# Patient Record
Sex: MALE | Race: WHITE | NOT HISPANIC OR LATINO | Employment: FULL TIME | URBAN - METROPOLITAN AREA
[De-identification: names, ages, dates, MRNs, and addresses within clinical notes are randomized per-mention and may not be internally consistent; named-entity substitution may affect disease eponyms.]

---

## 2017-07-03 ENCOUNTER — OFFICE VISIT (OUTPATIENT)
Dept: FAMILY MEDICINE | Facility: CLINIC | Age: 57
End: 2017-07-03

## 2017-07-03 VITALS
SYSTOLIC BLOOD PRESSURE: 122 MMHG | HEART RATE: 66 BPM | RESPIRATION RATE: 16 BRPM | BODY MASS INDEX: 28.64 KG/M2 | OXYGEN SATURATION: 97 % | HEIGHT: 74 IN | DIASTOLIC BLOOD PRESSURE: 74 MMHG | WEIGHT: 223.2 LBS

## 2017-07-03 DIAGNOSIS — R20.9 DISTURBANCE OF SKIN SENSATION: ICD-10-CM

## 2017-07-03 DIAGNOSIS — Z00.00 ROUTINE GENERAL MEDICAL EXAMINATION AT A HEALTH CARE FACILITY: Primary | ICD-10-CM

## 2017-07-03 DIAGNOSIS — Z13.220 LIPID SCREENING: ICD-10-CM

## 2017-07-03 DIAGNOSIS — Z98.84 GASTRIC BYPASS STATUS FOR OBESITY: ICD-10-CM

## 2017-07-03 DIAGNOSIS — E66.3 OVERWEIGHT (BMI 25.0-29.9): ICD-10-CM

## 2017-07-03 DIAGNOSIS — Z86.39 HISTORY OF DIABETES MELLITUS: ICD-10-CM

## 2017-07-03 DIAGNOSIS — Z80.42 FAMILY HISTORY OF PROSTATE CANCER: ICD-10-CM

## 2017-07-03 DIAGNOSIS — M15.0 PRIMARY OSTEOARTHRITIS INVOLVING MULTIPLE JOINTS: ICD-10-CM

## 2017-07-03 PROCEDURE — 36415 COLL VENOUS BLD VENIPUNCTURE: CPT | Performed by: FAMILY MEDICINE

## 2017-07-03 PROCEDURE — 99213 OFFICE O/P EST LOW 20 MIN: CPT | Mod: 25 | Performed by: FAMILY MEDICINE

## 2017-07-03 PROCEDURE — 82746 ASSAY OF FOLIC ACID SERUM: CPT | Mod: 90 | Performed by: FAMILY MEDICINE

## 2017-07-03 PROCEDURE — 82607 VITAMIN B-12: CPT | Mod: 90 | Performed by: FAMILY MEDICINE

## 2017-07-03 PROCEDURE — 99396 PREV VISIT EST AGE 40-64: CPT | Performed by: FAMILY MEDICINE

## 2017-07-03 PROCEDURE — 80061 LIPID PANEL: CPT | Mod: 90 | Performed by: FAMILY MEDICINE

## 2017-07-03 PROCEDURE — 84153 ASSAY OF PSA TOTAL: CPT | Mod: 90 | Performed by: FAMILY MEDICINE

## 2017-07-03 PROCEDURE — 80053 COMPREHEN METABOLIC PANEL: CPT | Mod: 90 | Performed by: FAMILY MEDICINE

## 2017-07-03 PROCEDURE — 83036 HEMOGLOBIN GLYCOSYLATED A1C: CPT | Mod: 90 | Performed by: FAMILY MEDICINE

## 2017-07-03 NOTE — MR AVS SNAPSHOT
After Visit Summary   7/3/2017    Ethan Weiss    MRN: [id number]           Patient Information     Date Of Birth          1960        Visit Information        Provider Department      7/3/2017 7:45 AM Philip Shah MD [facility]        Today's Diagnoses     Gastric bypass status for obesity    -  1    Atopic dermatitis, unspecified type        History of diabetes mellitus        Family history of prostate cancer        Lipid screening          Care Instructions      Preventive Health Recommendations  Male Ages 50 - 64    Yearly exam:             See your health care provider every year in order to  o   Review health changes.   o   Discuss preventive care.    o   Review your medicines if your doctor has prescribed any.     Have a cholesterol test every 5 years, or more frequently if you are at risk for high cholesterol/heart disease.     Have a diabetes test (fasting glucose) every three years. If you are at risk for diabetes, you should have this test more often.     Have a colonoscopy at age 50, or have a yearly FIT test (stool test). These exams will check for colon cancer.      Talk with your health care provider about whether or not a prostate cancer screening test (PSA) is right for you.    You should be tested each year for STDs (sexually transmitted diseases), if you re at risk.     Shots: Get a flu shot each year. Get a tetanus shot every 10 years.     Nutrition:    Eat at least 5 servings of fruits and vegetables daily.     Eat whole-grain bread, whole-wheat pasta and brown rice instead of white grains and rice.     Talk to your provider about Calcium and Vitamin D.     Lifestyle    Exercise for at least 150 minutes a week (30 minutes a day, 5 days a week). This will help you control your weight and prevent disease.     Limit alcohol to one drink per day.     No smoking.     Wear sunscreen to prevent skin cancer.     See your dentist every six months for an exam and  cleaning.     See your eye doctor every 1 to 2 years.    After Gastric Bypass: Nutrition Guidelines  After gastric bypass surgery, you will need to learn a new way to eat and drink. Your new stomach is much smaller than it was before. And it has a small opening at the bottom called a stoma (your bariatric surgeon may call it the gastrojejunostomy). This can become blocked by food if you are not careful. In order to protect your new stomach and get the results you want, you must:    Eat very small meals.    Eat slowly.    Eat softer foods.    Chew food well.    Don't eat food and drink liquids at the same time.    Take your vitamins and supplements regularly.   It is important to follow the eating plan that has been laid out for you. The surgery was only the first step. Success in losing weight depends on the choices you make after surgery.     Your new stomach holds only a small amount of food now. You will need to measure your food before eating.   After surgery  For 2 to 3 weeks after surgery, you will likely be on a liquid diet. You will then be able to eat pureed or soft foods only for an additional 2 to 3 weeks. Follow your bariatric surgery team's instructions for what liquids and soft foods are best. After this time, you can begin to bring other foods back into your diet, following the guidelines from your bariatric surgery team and dietitian.  Planning meals  After surgery, your stomach can only hold 2 to 4 tablespoons of food or drink. After about a year, it will expand to hold up to 16 tablespoons of food or drink. Because of its small size, you will need to eat and drink much less at any 1 meal than you did before surgery. You will need to plan your meals carefully. The foods you choose should be healthy and nutritious. Work with a dietitian to learn how to eat and the best foods to choose. Follow the eating plan you are given. Below are general guidelines.  How much to eat  Suggestions for how much to  eat include the following:     You should have 4 to 6 small meals per day, following your bariatric dietitian's recommendations.     You will have a planned, scheduled diet for almost 2 months. When you are eating a more normal diet, stick with the recommended foods. Use a small plate. Eat slowly and chew well. Stop eating when you are satisfied, and don't keep eating until you feel a full feeling. You can stretch the stomach pouch if you do that.   What to choose  Suggestions for what to choose include the following:     Try to eat the right amount of protein (see  Get enough protein  below).    Eat fruits and vegetables if they don t cause problems. Remove skins. Cook vegetables to make them easier to digest. Chew them well.    Choose whole-grain foods or add dietary fiber to your meals.  What to avoid  Suggestions for what to choose include the following:     Avoid sugary foods and drinks. They can cause dumping syndrome (see  Prevent dumping syndrome  below). They can also slow your weight loss or cause weight gain.    Limit oils and fats. This includes fried foods. Too much fat can cause nausea. It can also slow your weight loss. It may even cause weight gain.    Avoid alcohol. It has calories, but not nutrients, and can slow your weight loss.    Do not smoke. Smoking is a well-known cause of ulcers at the bottom of the stomach pouch after a gastric bypass.    Don't take NSAIDs on a regular basis. Medicines like ibuprofen, aspirin, or naproxen could cause ulcers at the bottom of the stomach pouch.  How to eat  After surgery, you will need to be careful when you eat. Your stomach is very small and can only hold a small amount of food. Follow these guidelines for eating meals:    Do not drink anything during a meal. You can drink again 30 to 45 minutes after a meal.    Take small bites. Chew your food 20 times before swallowing it. If you can t chew something completely, do not swallow it. Spit it out. This will  help prevent the stoma from being blocked.    Eat slowly. Allow 20 to 30 minutes for a meal.    Stop eating when you feel full. Eating too fast or too much can cause nausea and vomiting. It may also cause pain under your breastbone.    Do not snack between meals. This can limit your weight loss and even cause weight gain.  Preventing complications  Certain problems can happen after gastric bypass surgery. These include dehydration, malnutrition, and dumping syndrome. You will need to eat and drink carefully to prevent these. Read below to learn what you can do.  Keep a daily food and drink log  Note down everything you consume, even condiments such as ketchup and relish. Write down all drinks, including water. This will help you keep track of what and how much you are consuming.  Stay hydrated  Not drinking enough fluids can lead to dehydration. Symptoms include feeling very thirsty or having dark yellow urine. The new stomach can only hold a small amount of liquid at one time. So it is important to sip drinks throughout the day. Drink at least 6 to 8 cups (1 cup is 8 ounces) of sugar-free liquids every day. Drink slowly. Do not use straws or drink out of bottles, as this may cause painful gas. Avoid carbonated drinks for the first few months, as they will also cause gas. Do not drink during meals. This can lead to food not being digested properly.  Get enough protein  Protein is a very important part of your new diet. It makes you feel full and keeps your body working normally. After surgery, your surgical team will likely ask you to take protein shakes every day. You will need to eat low-fat, high-protein foods with each meal. You should work your way up to 60 to 100 grams of protein per day. If you eat meat, make sure it is not tough or full of fat or gristle. If you can t chew the meat thoroughly, don t swallow it. It can block your stoma. Avoid high-fat protein foods, such as sausage, kerr, hot dogs, and  high-fat hamburger meat. Choose low-fat, high-protein foods such as:    Chicken and turkey (white meat)    Fish and shellfish (not breaded or fried)    Eggs, egg whites, and egg substitutes    Low-fat and fat-free dairy products (milk, yogurt, cottage cheese)    Soy milk and tofu    Tuna fish and canned salmon     Peanut butter   Beans, lentils, vegetables, and nuts also contain protein. However, they do not have all the amino acids that animal protein has. You can eat these foods, but you should have them in addition to other animal proteins as listed above. If you have trouble meeting your daily protein needs, you may need to take a protein supplement. Make sure that the protein supplement has only protein and does not contain sugar (or lactose, if you are lactose intolerant).   Not getting enough protein can lead to protein malnutrition. Symptoms of inadequate protein (and caloric) intake include excessive hair loss, dry skin, fatigue, and always feeling cold when others are not cold. Some of these symptoms are common after gastric bypass. You can minimize them by concentrating on protein intake. These symptoms should resolve by 4 to 6 months after the operation.   Reintroduce foods slowly  After surgery, some foods are more likely to cause pain, nausea, vomiting, or blockage. These include meats, fruits, vegetables, breads, pasta, and rice. Try to add these back into your diet one at a time. Chew thoroughly. If you can t tolerate a food, try it again in 1 to 2 weeks. Also, be careful with dairy foods. After surgery, these may give you cramps, bloating, or diarrhea. This is because you may lose the ability to digest lactose after surgery. If necessary, try lactose-free dairy products. You can also try taking lactase pills with dairy foods. This can be cheaper than buying lactose-free milk.  Prevent dumping syndrome  Dumping syndrome is a condition that can happen after gastric bypass surgery. It is related to the  "rapid entry or \"dumping\" of high-sugar meals into the intestine from the stomach pouch. It can happen 10 to 30 minutes after eating sugary foods or as late as 2 to 3 hours after eating. It can also happen after eating too quickly or too much at once. Symptoms may include intestinal cramps, nausea, vomiting, diarrhea, fast heart rate, dizziness, flushing, and sweating. The symptoms usually pass in 15 to 30 minutes. Your symptoms may go away faster if you sip 1 cup of water. You may want to rest afterward. In rare instances, you may have additional symptoms a few hours later, including low blood sugar. You may feel shaky and anxious. Sugar is the most common cause for dumping. You can help prevent dumping syndrome by keeping your diet low in sugar. A low-sugar diet means avoiding:    Sugary foods, such as candy, chocolate, sweetened gum, sweetened yogurt (including frozen yogurt), sugary cereals, sweet baked goods, ice cream, and dried preserved fruit    Sugary drinks, such as nondiet soda, fruit juice, and coffee and tea with sugar or flavored maple syrup    Sugary condiments, such as jam, honey, and syrup  Read food and drink labels to see if they contain sugar. Look for sugars, sweeteners, syrups, cane juice, agave, maltodextrin, and words ending in -ose. You can use artificial sweeteners as substitute for sugar. These include aspartame, saccharine, stevia, and sucralose.  Take vitamin and mineral supplements  After bariatric surgery, your body will not be able to absorb all the vitamins and minerals it needs through food. Symptoms of low amounts of vitamins and minerals in your body include anemia (low blood count), sores around your mouth, a painful tongue, and fatigue. Over time, low amounts of vitamins and minerals can cause serious health problems. You will need to take vitamin and mineral supplements every day for the rest of your life to prevent this. The supplements include:    A chewable multivitamin with " minerals (1 to 2 pills daily; take just before eating)    Calcium citrate with vitamin D (1,200 mg daily; take just before eating)    Other supplements, such as vitamin B12, as advised by your health care provider  When to call the healthcare provider  Call your healthcare provider if you notice any of the following:    Pain, nausea, or vomiting after eating or drinking that doesn t go away in 20 to 30 minutes     Vomiting of blood or bile (yellow-green fluid)     Diarrhea that doesn t go away    Pain in your upper back, chest or left shoulder    Confusion, depression, or unusual fatigue    Urinating more than usual    Difficulty urinating    Burning, pain, or bleeding when you pass urine    Hiccups that won t go away    Night sweats   Date Last Reviewed: 3/25/2016    [id number] [employer]. [address] [place] [address]. All rights reserved. This information is not intended as a substitute for professional medical care. Always follow your healthcare professional's instructions.                Follow-ups after your visit        Your next 10 appointments already scheduled     Nov 07, 2017  9:00 AM CST   Annual Visit with Yina Grigsby PA-C   [facility] ([facility])    [address] [place] [id number] [phone]            Nov 07, 2017  9:30 AM CST   Annual Visit with Meño Sheth 3, CARISSA   [facility] ([facility])    [address] [place] [id number] [phone]              Who to contact     If you have questions or need follow up information about today's clinic visit or your schedule please contact [facility] directly at [phone].  Normal or non-critical lab and imaging results will be communicated to you by MyChart, letter or phone within 4 business days after the clinic has received the  "results. If you do not hear from us within 7 days, please contact the clinic through [employer] or phone. If you have a critical or abnormal lab result, we will notify you by phone as soon as possible.  Submit refill requests through [employer] or call your pharmacy and they will forward the refill request to us. Please allow 3 business days for your refill to be completed.          Additional Information About Your Visit        [employer] Information     [employer] gives you secure access to your electronic health record. If you see a primary care provider, you can also send messages to your care team and make appointments. If you have questions, please call your primary care clinic.  If you do not have a primary care provider, please call [phone] and they will assist you.        Care EveryWhere ID     This is your Care EveryWhere ID. This could be used by other organizations to access your [facility] medical records  [id number]        Your Vitals Were     Pulse Respirations Height Pulse Oximetry BMI (Body Mass Index)       66 16 1.867 m (6' 1.5\") 97% 29.05 kg/m2        Blood Pressure from Last 3 Encounters:   07/03/17 122/74   11/29/16 130/72   04/25/16 118/78    Weight from Last 3 Encounters:   07/03/17 101.2 kg (223 lb 3.2 oz)   11/29/16 98.5 kg (217 lb 3.2 oz)   04/25/16 97.1 kg (214 lb)              We Performed the Following     Comp. Metabolic Panel (14) ([facility])     Hemoglobin A1C ([facility])     Lipid Panel ([facility])     PSA Serum ([facility])     Vitamin B12 and Folate ([facility])        Primary Care Provider Office Phone # Fax #    Philip Shah [phone]       [facility] [address]  [facility] [address]        Equal Access to Services     VIOLETTE HONEYCUTT : Meka Brennan, yvrose herring, qajim kaaljessika osorio. So [facility] [phone].    ATENCIÓN: Si habla español, tiene a kenyon disposición servicios gratuitos de " asistencia lingüística. Praful al [phone].    We comply with applicable federal civil rights laws and [place] laws. We do not discriminate on the basis of race, color, national origin, age, disability sex, sexual orientation or gender identity.            Thank you!     Thank you for choosing [facility]  for your care. Our goal is always to provide you with excellent care. Hearing back from our patients is one way we can continue to improve our services. Please take a few minutes to complete the written survey that you may receive in the mail after your visit with us. Thank you!             Your Updated Medication List - Protect others around you: Learn how to safely use, store and throw away your medicines at www.disposemymeds.org.          This list is accurate as of: 7/3/17  8:23 AM.  Always use your most recent med list.                   Brand Name Dispense Instructions for use Diagnosis    B-12 500 MCG Subl      Place 1 tablet under the tongue At Bedtime        CALTRATE PLUS OR      Take 400 mg by mouth 3 times daily 2 - 200 mg each meal        MULTIvitamin  S Caps      Take 2 chew tab by mouth At Bedtime.        triamcinolone 0.1 % ointment    KENALOG     as needed Ears, arms, legs        vitamin D 2000 UNITS Caps      Take by mouth daily 2- 1000 Int.l Units each meal

## 2017-07-03 NOTE — PROGRESS NOTES
"  SUBJECTIVE:   CC: Ethan Weiss is an 56 year old male who presents for preventative health visit.   Also Multiple medical issues   See notes    Status post gastric bypass, laparoscopic Renea-en-Y.  2013 .  Minimal weight gain aware of diet that should be followed.  Sees the weight loss surgeons annually    Family history prostate cancer no problems with urination and sexual function.    History of diabetes, also impaired fasting glucose.    History of anxiety he denies this being an issue in his life at this time    Tingling in his toes, he denies it being \"numbness\" is very mild occurs all times a day.      Healthy Habits:    Do you get at least three servings of calcium containing foods daily (dairy, green leafy vegetables, etc.)? Yes & supplements    Amount of exercise or daily activities, outside of work: 7 day(s) per week    Problems taking medications regularly No    Medication side effects: No    Have you had an eye exam in the past two years? yes    Do you see a dentist twice per year? yes    Do you have sleep apnea, excessive snoring or daytime drowsiness?no    HEARING FREQUENCY:   Right Ear:     500 Hz :  pass   1000 Hz: pass   2000 Hz: pass   4000 Hz: pass  Left Ear:     500 Hz :  pass   1000 Hz: pass   2000 Hz: pass   4000 Hz: pass  Yvonne Walter MA 7/3/2017      [place] clinic [place] - [place]  Fasting labs     Today's PHQ-2 Score:   PHQ-2 ( 1999 [employer]) 7/3/2017 4/25/2016   Q1: Little interest or pleasure in doing things 0 0   Q2: Feeling down, depressed or hopeless 0 0   PHQ-2 Score 0 0       Abuse: Current or Past(Physical, Sexual or Emotional)- No  Do you feel safe in your environment - Yes    Social History   Substance Use Topics     Smoking status: Never Smoker     Smokeless tobacco: Never Used     Alcohol use 0.0 - 0.6 oz/week     0 - 1 Standard drinks or equivalent per week      Comment: 1 drink per week     The patient does not drink >3 drinks per day nor >7 " "drinks per week.      Reviewed orders with patient. Reviewed health maintenance and updated orders accordingly - Yes  Labs reviewed in EPIC    Reviewed and updated as needed this visit by clinical staff  Tobacco  Allergies  Meds         Reviewed and updated as needed this visit by Provider            ROS:  C: NEGATIVE for fever, chills, change in weight  I: NEGATIVE for worrisome rashes, moles or lesions  E: NEGATIVE for vision changes or irritation  ENT: NEGATIVE for ear, mouth and throat problems  R: NEGATIVE for significant cough or SOB  CV: NEGATIVE for chest pain, palpitations or peripheral edema  GI: NEGATIVE for nausea, abdominal pain, heartburn, or change in bowel habits   male: negative for dysuria, hematuria, decreased urinary stream, erectile dysfunction, urethral discharge  M: hard to bend great toes bilat  NEURO: toes bnoth feet \"tingling\"  P: NEGATIVE for changes in mood or affect    OBJECTIVE:   Ht 1.867 m (6' 1.5\")  Wt 101.2 kg (223 lb 3.2 oz)  BMI 29.05 kg/m2  /74  Pulse 66  Resp 16  Ht 1.867 m (6' 1.5\")  Wt 101.2 kg (223 lb 3.2 oz)  SpO2 97%  BMI 29.05 kg/m2    EXAM:  GENERAL: healthy, alert and no distress  EYES: Eyes grossly normal to inspection, PERRL and conjunctivae and sclerae normal  HENT: ear canals and TM's normal, nose and mouth without ulcers or lesions  HENT: right ear: occluded with wax and left ear: occluded with wax  NECK: no adenopathy, no asymmetry, masses, or scars and thyroid normal to palpation  NECK: scar on left sublingular area  RESP: lungs clear to auscultation - no rales, rhonchi or wheezes  CV: regular rate and rhythm, normal S1 S2, no S3 or S4, no murmur, click or rub, no peripheral edema and peripheral pulses strong  ABDOMEN: soft, nontender, no hepatosplenomegaly, no masses and bowel sounds normal   (male): normal male genitalia without lesions or urethral discharge, no hernia LEFT EPIDIDYMIS IS SLIGHTLY ENLARGED OTHERWISE FEELS NORMAL  RECTAL: " "normal sphincter tone, no rectal masses and prostate normal size, nontender FIRM NOT HARD  MS: no gross musculoskeletal defects noted, no edema  MINOR OA CHANGES GREAT TOE MTPS  SKIN: many nevi torso  Benign, SCALP WITH PINK AREA ON LEFT PARIETAL AREA  6 MM SLIGHTLY DEPRESSED. ( states eval with derm for other issues this area was the same at that time)  NEURO: Normal strength and tone, mentation intact and speech normal  PSYCH: mentation appears normal, affect normal/bright  LYMPH: no cervical, supraclavicular, axillary, or inguinal adenopathy  Diabetic foot exam: normal DP and PT pulses, no trophic changes or ulcerative lesions and normal sensory exam  Monofilament was normal on feet at all areas accept left great toe   ASSESSMENT/PLAN:   Ethan was seen today for physical, hearing screening and numbness both feet.    Diagnoses and all orders for this visit:    Routine general medical examination at a health care facility  COLON UTD. IMM UTD  EXERCISE IS GOOD  DIET COULD BE BETTER DISCUSSED\    Lipid screening  -     Lipid Panel ([facility])    Family history of prostate cancer  -     PSA Serum ([facility])  Dad- NO PROBS WITH URINATION OR SEXUAL FUNCTION    Multiple medical issues    Gastric bypass status for obesity  -     Comp. Metabolic Panel (14) ([facility])  -     Vitamin B12 and Folate ([facility])  CONTINUE ANNUAL FOLLOW UP WITH SURGERY    Overweight (BMI 25.0-29.9)  MORE DIETARY RESTRICTIONS NEEDED    History of diabetes mellitus  -     Hemoglobin A1C ([facility])    Primary osteoarthritis involving multiple joints  GREAT TOES- MAY BE RELATED TO \"TINGLING\" IN TOES    Disturbance of skin sensation  PRETTY NORMAL EXAM TO MONOFILAMENT    SCALP LESION-  HE SAYS IS UNCHANGING AND NOT CONCERNING WE DISCUSSED IF ANY CHANGES RETURN TO DERM      COUNSELING:  Reviewed preventive health counseling, as reflected in patient instructions       Regular exercise       Healthy diet/nutrition       Hearing screening       Colon cancer " "screening       Prostate cancer screening       Osteoporosis Prevention/Bone Health         reports that he has never smoked. He has never used smokeless tobacco.    Estimated body mass index is 29.05 kg/(m^2) as calculated from the following:    Height as of this encounter: 1.867 m (6' 1.5\").    Weight as of this encounter: 101.2 kg (223 lb 3.2 oz).   Weight management plan: Discussed healthy diet and exercise guidelines and patient will follow up in 12 months in clinic to re-evaluate.    Counseling Resources:  ATP IV Guidelines  Pooled Cohorts Equation Calculator  FRAX Risk Assessment  ICSI Preventive Guidelines  Dietary Guidelines for Americans, 2010  USDA's MyPlate  ASA Prophylaxis  Lung CA Screening  >40 min spent with patient, greater than 50% spent on discussion/education/planning - as outlined in assessment and plan      Philip Shah MD  [facility]  "

## 2017-07-03 NOTE — PATIENT INSTRUCTIONS
Preventive Health Recommendations  Male Ages 50 - 64    Yearly exam:             See your health care provider every year in order to  o   Review health changes.   o   Discuss preventive care.    o   Review your medicines if your doctor has prescribed any.     Have a cholesterol test every 5 years, or more frequently if you are at risk for high cholesterol/heart disease.     Have a diabetes test (fasting glucose) every three years. If you are at risk for diabetes, you should have this test more often.     Have a colonoscopy at age 50, or have a yearly FIT test (stool test). These exams will check for colon cancer.      Talk with your health care provider about whether or not a prostate cancer screening test (PSA) is right for you.    You should be tested each year for STDs (sexually transmitted diseases), if you re at risk.     Shots: Get a flu shot each year. Get a tetanus shot every 10 years.     Nutrition:    Eat at least 5 servings of fruits and vegetables daily.     Eat whole-grain bread, whole-wheat pasta and brown rice instead of white grains and rice.     Talk to your provider about Calcium and Vitamin D.     Lifestyle    Exercise for at least 150 minutes a week (30 minutes a day, 5 days a week). This will help you control your weight and prevent disease.     Limit alcohol to one drink per day.     No smoking.     Wear sunscreen to prevent skin cancer.     See your dentist every six months for an exam and cleaning.     See your eye doctor every 1 to 2 years.    After Gastric Bypass: Nutrition Guidelines  After gastric bypass surgery, you will need to learn a new way to eat and drink. Your new stomach is much smaller than it was before. And it has a small opening at the bottom called a stoma (your bariatric surgeon may call it the gastrojejunostomy). This can become blocked by food if you are not careful. In order to protect your new stomach and get the results you want, you must:    Eat very small  meals.    Eat slowly.    Eat softer foods.    Chew food well.    Don't eat food and drink liquids at the same time.    Take your vitamins and supplements regularly.   It is important to follow the eating plan that has been laid out for you. The surgery was only the first step. Success in losing weight depends on the choices you make after surgery.     Your new stomach holds only a small amount of food now. You will need to measure your food before eating.   After surgery  For 2 to 3 weeks after surgery, you will likely be on a liquid diet. You will then be able to eat pureed or soft foods only for an additional 2 to 3 weeks. Follow your bariatric surgery team's instructions for what liquids and soft foods are best. After this time, you can begin to bring other foods back into your diet, following the guidelines from your bariatric surgery team and dietitian.  Planning meals  After surgery, your stomach can only hold 2 to 4 tablespoons of food or drink. After about a year, it will expand to hold up to 16 tablespoons of food or drink. Because of its small size, you will need to eat and drink much less at any 1 meal than you did before surgery. You will need to plan your meals carefully. The foods you choose should be healthy and nutritious. Work with a dietitian to learn how to eat and the best foods to choose. Follow the eating plan you are given. Below are general guidelines.  How much to eat  Suggestions for how much to eat include the following:     You should have 4 to 6 small meals per day, following your bariatric dietitian's recommendations.     You will have a planned, scheduled diet for almost 2 months. When you are eating a more normal diet, stick with the recommended foods. Use a small plate. Eat slowly and chew well. Stop eating when you are satisfied, and don't keep eating until you feel a full feeling. You can stretch the stomach pouch if you do that.   What to choose  Suggestions for what to choose  include the following:     Try to eat the right amount of protein (see  Get enough protein  below).    Eat fruits and vegetables if they don t cause problems. Remove skins. Cook vegetables to make them easier to digest. Chew them well.    Choose whole-grain foods or add dietary fiber to your meals.  What to avoid  Suggestions for what to choose include the following:     Avoid sugary foods and drinks. They can cause dumping syndrome (see  Prevent dumping syndrome  below). They can also slow your weight loss or cause weight gain.    Limit oils and fats. This includes fried foods. Too much fat can cause nausea. It can also slow your weight loss. It may even cause weight gain.    Avoid alcohol. It has calories, but not nutrients, and can slow your weight loss.    Do not smoke. Smoking is a well-known cause of ulcers at the bottom of the stomach pouch after a gastric bypass.    Don't take NSAIDs on a regular basis. Medicines like ibuprofen, aspirin, or naproxen could cause ulcers at the bottom of the stomach pouch.  How to eat  After surgery, you will need to be careful when you eat. Your stomach is very small and can only hold a small amount of food. Follow these guidelines for eating meals:    Do not drink anything during a meal. You can drink again 30 to 45 minutes after a meal.    Take small bites. Chew your food 20 times before swallowing it. If you can t chew something completely, do not swallow it. Spit it out. This will help prevent the stoma from being blocked.    Eat slowly. Allow 20 to 30 minutes for a meal.    Stop eating when you feel full. Eating too fast or too much can cause nausea and vomiting. It may also cause pain under your breastbone.    Do not snack between meals. This can limit your weight loss and even cause weight gain.  Preventing complications  Certain problems can happen after gastric bypass surgery. These include dehydration, malnutrition, and dumping syndrome. You will need to eat and  drink carefully to prevent these. Read below to learn what you can do.  Keep a daily food and drink log  Note down everything you consume, even condiments such as ketchup and relish. Write down all drinks, including water. This will help you keep track of what and how much you are consuming.  Stay hydrated  Not drinking enough fluids can lead to dehydration. Symptoms include feeling very thirsty or having dark yellow urine. The new stomach can only hold a small amount of liquid at one time. So it is important to sip drinks throughout the day. Drink at least 6 to 8 cups (1 cup is 8 ounces) of sugar-free liquids every day. Drink slowly. Do not use straws or drink out of bottles, as this may cause painful gas. Avoid carbonated drinks for the first few months, as they will also cause gas. Do not drink during meals. This can lead to food not being digested properly.  Get enough protein  Protein is a very important part of your new diet. It makes you feel full and keeps your body working normally. After surgery, your surgical team will likely ask you to take protein shakes every day. You will need to eat low-fat, high-protein foods with each meal. You should work your way up to 60 to 100 grams of protein per day. If you eat meat, make sure it is not tough or full of fat or gristle. If you can t chew the meat thoroughly, don t swallow it. It can block your stoma. Avoid high-fat protein foods, such as sausage, kerr, hot dogs, and high-fat hamburger meat. Choose low-fat, high-protein foods such as:    Chicken and turkey (white meat)    Fish and shellfish (not breaded or fried)    Eggs, egg whites, and egg substitutes    Low-fat and fat-free dairy products (milk, yogurt, cottage cheese)    Soy milk and tofu    Tuna fish and canned salmon     Peanut butter   Beans, lentils, vegetables, and nuts also contain protein. However, they do not have all the amino acids that animal protein has. You can eat these foods, but you should  "have them in addition to other animal proteins as listed above. If you have trouble meeting your daily protein needs, you may need to take a protein supplement. Make sure that the protein supplement has only protein and does not contain sugar (or lactose, if you are lactose intolerant).   Not getting enough protein can lead to protein malnutrition. Symptoms of inadequate protein (and caloric) intake include excessive hair loss, dry skin, fatigue, and always feeling cold when others are not cold. Some of these symptoms are common after gastric bypass. You can minimize them by concentrating on protein intake. These symptoms should resolve by 4 to 6 months after the operation.   Reintroduce foods slowly  After surgery, some foods are more likely to cause pain, nausea, vomiting, or blockage. These include meats, fruits, vegetables, breads, pasta, and rice. Try to add these back into your diet one at a time. Chew thoroughly. If you can t tolerate a food, try it again in 1 to 2 weeks. Also, be careful with dairy foods. After surgery, these may give you cramps, bloating, or diarrhea. This is because you may lose the ability to digest lactose after surgery. If necessary, try lactose-free dairy products. You can also try taking lactase pills with dairy foods. This can be cheaper than buying lactose-free milk.  Prevent dumping syndrome  Dumping syndrome is a condition that can happen after gastric bypass surgery. It is related to the rapid entry or \"dumping\" of high-sugar meals into the intestine from the stomach pouch. It can happen 10 to 30 minutes after eating sugary foods or as late as 2 to 3 hours after eating. It can also happen after eating too quickly or too much at once. Symptoms may include intestinal cramps, nausea, vomiting, diarrhea, fast heart rate, dizziness, flushing, and sweating. The symptoms usually pass in 15 to 30 minutes. Your symptoms may go away faster if you sip 1 cup of water. You may want to rest " afterward. In rare instances, you may have additional symptoms a few hours later, including low blood sugar. You may feel shaky and anxious. Sugar is the most common cause for dumping. You can help prevent dumping syndrome by keeping your diet low in sugar. A low-sugar diet means avoiding:    Sugary foods, such as candy, chocolate, sweetened gum, sweetened yogurt (including frozen yogurt), sugary cereals, sweet baked goods, ice cream, and dried preserved fruit    Sugary drinks, such as nondiet soda, fruit juice, and coffee and tea with sugar or flavored maple syrup    Sugary condiments, such as jam, honey, and syrup  Read food and drink labels to see if they contain sugar. Look for sugars, sweeteners, syrups, cane juice, agave, maltodextrin, and words ending in -ose. You can use artificial sweeteners as substitute for sugar. These include aspartame, saccharine, stevia, and sucralose.  Take vitamin and mineral supplements  After bariatric surgery, your body will not be able to absorb all the vitamins and minerals it needs through food. Symptoms of low amounts of vitamins and minerals in your body include anemia (low blood count), sores around your mouth, a painful tongue, and fatigue. Over time, low amounts of vitamins and minerals can cause serious health problems. You will need to take vitamin and mineral supplements every day for the rest of your life to prevent this. The supplements include:    A chewable multivitamin with minerals (1 to 2 pills daily; take just before eating)    Calcium citrate with vitamin D (1,200 mg daily; take just before eating)    Other supplements, such as vitamin B12, as advised by your health care provider  When to call the healthcare provider  Call your healthcare provider if you notice any of the following:    Pain, nausea, or vomiting after eating or drinking that doesn t go away in 20 to 30 minutes     Vomiting of blood or bile (yellow-green fluid)     Diarrhea that doesn t go  away    Pain in your upper back, chest or left shoulder    Confusion, depression, or unusual fatigue    Urinating more than usual    Difficulty urinating    Burning, pain, or bleeding when you pass urine    Hiccups that won t go away    Night sweats   Date Last Reviewed: 3/25/2016    3827-9336 [employer]. [address] [place] [address], [place] [address]. All rights reserved. This information is not intended as a substitute for professional medical care. Always follow your healthcare professional's instructions.

## 2017-07-04 LAB
ALBUMIN SERPL-MCNC: 4.4 G/DL (ref 3.5–5.5)
ALBUMIN/GLOB SERPL: 1.8 {RATIO} (ref 1.2–2.2)
ALP SERPL-CCNC: 83 IU/L (ref 39–117)
ALT SERPL-CCNC: 23 IU/L (ref 0–44)
AST SERPL-CCNC: 14 IU/L (ref 0–40)
BILIRUB SERPL-MCNC: 0.8 MG/DL (ref 0–1.2)
BUN SERPL-MCNC: 14 MG/DL (ref 6–24)
BUN/CREATININE RATIO: 15 (ref 9–20)
CALCIUM SERPL-MCNC: 9.2 MG/DL (ref 8.7–10.2)
CHLORIDE SERPLBLD-SCNC: 98 MMOL/L (ref 96–106)
CHOLEST SERPL-MCNC: 163 MG/DL (ref 100–199)
CREAT SERPL-MCNC: 0.91 MG/DL (ref 0.76–1.27)
EGFR IF AFRICN AM: 108 ML/MIN/1.73
EGFR IF NONAFRICN AM: 93 ML/MIN/1.73
FOLATE: >20 NG/ML
GLOBULIN, TOTAL: 2.5 G/DL (ref 1.5–4.5)
GLUCOSE SERPL-MCNC: 86 MG/DL (ref 65–99)
HBA1C MFR BLD: 5.5 % (ref 4.8–5.6)
HDLC SERPL-MCNC: 45 MG/DL
LDL/HDL RATIO: 2.2 RATIO UNITS (ref 0–3.6)
LDLC SERPL CALC-MCNC: 101 MG/DL (ref 0–99)
POTASSIUM SERPL-SCNC: 4 MMOL/L (ref 3.5–5.2)
PROT SERPL-MCNC: 6.9 G/DL (ref 6–8.5)
PSA NG/ML: 2.1 NG/ML (ref 0–4)
SODIUM SERPL-SCNC: 139 MMOL/L (ref 134–144)
TOTAL CO2: 29 MMOL/L (ref 18–28)
TRIGL SERPL-MCNC: 83 MG/DL (ref 0–149)
VIT B12 SERPL-MCNC: 1138 PG/ML (ref 211–946)
VLDLC SERPL CALC-MCNC: 17 MG/DL (ref 5–40)

## 2017-10-16 DIAGNOSIS — Z23 NEED FOR PROPHYLACTIC VACCINATION AND INOCULATION AGAINST INFLUENZA: Primary | ICD-10-CM

## 2017-10-16 PROCEDURE — 90686 IIV4 VACC NO PRSV 0.5 ML IM: CPT | Performed by: FAMILY MEDICINE

## 2017-10-16 PROCEDURE — 90471 IMMUNIZATION ADMIN: CPT | Performed by: FAMILY MEDICINE

## 2017-10-16 NOTE — PROGRESS NOTES
Injectable Influenza Immunization Documentation    1.  Is the person to be vaccinated sick today?   No    2. Does the person to be vaccinated have an allergy to a component   of the vaccine?   No    3. Has the person to be vaccinated ever had a serious reaction   to influenza vaccine in the past?   No    4. Has the person to be vaccinated ever had Guillain-Barré syndrome?   No    Form completed by Lucinda Lozada MA

## 2017-11-07 ENCOUNTER — HOSPITAL ENCOUNTER (OUTPATIENT)
Dept: LAB | Facility: CLINIC | Age: 57
Discharge: HOME | End: 2017-11-07
Attending: PHYSICIAN ASSISTANT | Admitting: PHYSICIAN ASSISTANT
Payer: COMMERCIAL

## 2017-11-07 ENCOUNTER — OFFICE VISIT (OUTPATIENT)
Dept: SURGERY | Facility: CLINIC | Age: 57
End: 2017-11-07
Payer: COMMERCIAL

## 2017-11-07 VITALS
SYSTOLIC BLOOD PRESSURE: 130 MMHG | BODY MASS INDEX: 29.42 KG/M2 | HEART RATE: 59 BPM | WEIGHT: 226.06 LBS | DIASTOLIC BLOOD PRESSURE: 66 MMHG

## 2017-11-07 VITALS — WEIGHT: 226.1 LBS | HEIGHT: 74 IN | BODY MASS INDEX: 29.02 KG/M2

## 2017-11-07 DIAGNOSIS — Z98.84 BARIATRIC SURGERY STATUS: Primary | ICD-10-CM

## 2017-11-07 DIAGNOSIS — Z98.84 BARIATRIC SURGERY STATUS: ICD-10-CM

## 2017-11-07 DIAGNOSIS — K91.2 POSTSURGICAL MALABSORPTION: ICD-10-CM

## 2017-11-07 DIAGNOSIS — E66.3 OVERWEIGHT (BMI 25.0-29.9): ICD-10-CM

## 2017-11-07 DIAGNOSIS — Z98.84 GASTRIC BYPASS STATUS FOR OBESITY: ICD-10-CM

## 2017-11-07 LAB
DEPRECATED CALCIDIOL+CALCIFEROL SERPL-MC: 44 UG/L (ref 20–75)
ERYTHROCYTE [DISTWIDTH] IN BLOOD BY AUTOMATED COUNT: 13.3 % (ref 10–15)
FERRITIN SERPL-MCNC: 46 NG/ML (ref 26–388)
HCT VFR BLD AUTO: 42 % (ref 40–53)
HGB BLD-MCNC: 14.3 G/DL (ref 13.3–17.7)
IRON SATN MFR SERPL: 33 % (ref 15–46)
IRON SERPL-MCNC: 99 UG/DL (ref 35–180)
MCH RBC QN AUTO: 30 PG (ref 26.5–33)
MCHC RBC AUTO-ENTMCNC: 34 G/DL (ref 31.5–36.5)
MCV RBC AUTO: 88 FL (ref 78–100)
PLATELET # BLD AUTO: 150 10E9/L (ref 150–450)
PTH-INTACT SERPL-MCNC: 63 PG/ML (ref 12–72)
RBC # BLD AUTO: 4.77 10E12/L (ref 4.4–5.9)
TIBC SERPL-MCNC: 302 UG/DL (ref 240–430)
VIT B12 SERPL-MCNC: 1230 PG/ML (ref 193–986)
WBC # BLD AUTO: 4.9 10E9/L (ref 4–11)

## 2017-11-07 PROCEDURE — 99214 OFFICE O/P EST MOD 30 MIN: CPT | Performed by: PHYSICIAN ASSISTANT

## 2017-11-07 PROCEDURE — 82607 VITAMIN B-12: CPT | Performed by: PHYSICIAN ASSISTANT

## 2017-11-07 PROCEDURE — 82728 ASSAY OF FERRITIN: CPT | Performed by: PHYSICIAN ASSISTANT

## 2017-11-07 PROCEDURE — 36415 COLL VENOUS BLD VENIPUNCTURE: CPT | Performed by: PHYSICIAN ASSISTANT

## 2017-11-07 PROCEDURE — 83970 ASSAY OF PARATHORMONE: CPT | Performed by: PHYSICIAN ASSISTANT

## 2017-11-07 PROCEDURE — 82306 VITAMIN D 25 HYDROXY: CPT | Performed by: PHYSICIAN ASSISTANT

## 2017-11-07 PROCEDURE — 85027 COMPLETE CBC AUTOMATED: CPT | Performed by: PHYSICIAN ASSISTANT

## 2017-11-07 PROCEDURE — 97803 MED NUTRITION INDIV SUBSEQ: CPT | Performed by: DIETITIAN, REGISTERED

## 2017-11-07 PROCEDURE — 83540 ASSAY OF IRON: CPT | Performed by: PHYSICIAN ASSISTANT

## 2017-11-07 PROCEDURE — 83550 IRON BINDING TEST: CPT | Performed by: PHYSICIAN ASSISTANT

## 2017-11-07 NOTE — PROGRESS NOTES
"NUTRITION POST OP APPOINTMENT  DATE OF VISIT: November 7, 2017    Ethan Weiss  1960  male  [id number]  56 year old     ASSESSMENT:    REASON FOR VISIT:  Ethan is a 56 year old year old male presents today for 4 year PO nutrition follow-up appointment. Patient is accompanied by self.    DIAGNOSIS:  Status post gastric bypass surgery.  Obesity Overweight BMI 25-29.9     ANTHROPOMETRICS:  Initial Weight: 318 lbs  Height: 6' 1.5\"  Current Weight:  226 lbs 1.6 oz   BMI: Body mass index is 29.43 kg/(m^2).       VITAMINS AND MINERALS:  2 Multivitamin with Minerals  400 mg Calcium With Vitamin D TID  2000 International units Vitamin D  500 mcg Vitamin B-12 sublingual, 3x/week  No iron needed per clinic guidelines    NUTRITION HISTORY:  Breakfast: Greek yogurt + granola (1 tablespoon) + 8oz low sodium V8  Lunch: Frozen meal - [employer], Smart Ones  Supper: 1/4 lb frozen shrimp or meal delivery service   Snacks: PB2 on 1 pieces of toast, crackers (some impulse snacking), apples,  Decaf lattes made with decaf coffee and Fair Life milk (16oz milk total for the day)   Fluids consumed: Crystal Light, Fair Life Milk + Decaf coffee, V8  Consuming liquid calories: Yes - milk, V8  Protein intake: 60-90 grams/day  Tolerate regular texture food: Yes  Any foods not tolerated details: Yes  If any food not tolerated: bread in large portions  Portion size: 1 cup or more  Take 20-30 minutes to consume each meal: No   Eat protein foods first: Yes  Fluids and meals separate by at least 30 minutes: No  Chew foods thoroughly: No  Tolerating diet: Yes  Drinking high protein supplements: Yes  Consuming snacks per day: 1-2  Additional Information: Pt up 9lbs from last year, many questions about getting weight back off. Discussed getting back to basics with all bariatric habits. Pt asking for caloric goal. Not in obese category but provided 11-14kcal/kg range  (1131-1439kcals) and encouraged pt to aim for 7213-4354 to start. Provided pt " with new copies of dietary handbook, list of proteins, cooking/cookbooks, Stage 5 meal plan and grocery list.         PHYSICAL ACTIVITY:  Type: Rowing and running  Frequency (days per week): 4-6  Duration (min): 30-60    DIAGNOSIS:  Previous Nutrition Diagnosis: Altered gastrointestinal function related to alteration in gastrointestinal structure as evidenced by history of gastric bypass surgery.- no change    Previous goals:  Replace snacks with 16 oz milk (prefers Fairlife) - not met  Eat 1 serving of fruit per day - not met  Practice alternatives of mindless eating - not met    Current Nutrition Diagnosis: Altered gastrointestinal function related to alteration in gastrointestinal structure as evidenced by history of gastric bypass surgery.    INTERVENTION:   Nutrition Prescription: Eat 3 meals a day at regular intervals. Consume 60-90 grams of protein daily. Follow post-surgical vitamin and mineral protocol.  Assessed learning needs and learning preferences.    GOALS:  Do not exceed 1 cup per meal  Do not snack on solid foods between meals  Separate fluids and meals by 30 minutes.  Aim for 1000-1200kcals/day, if tracking    Follow-Up:   Recommend annual follow up visits to assist with lifestyle changes   Implementation: Discussed progress toward previous goals; reinforced importance of following bariatric lifestyle changes.    NUTRITION MONITORING AND EVALUATION:  Anticipated compliance: good  Verbalized good understanding.    Follow up: Patient to follow up in 12 months.    TIME SPENT WITH PATIENT:  25 minutes    Sammie Carpio RD, LD  Clinical Dietitian

## 2017-11-07 NOTE — MR AVS SNAPSHOT
After Visit Summary   11/7/2017    Ethan Weiss    MRN: [id number]           Patient Information     Date Of Birth          1960        Visit Information        Provider Department      11/7/2017 9:00 AM Jann Arteaga PA-C [facility]      Today's Diagnoses     Bariatric surgery status    -  1    Overweight (BMI 25.0-29.9)        Postsurgical malabsorption          Care Instructions    4 years status laparoscopic gastric bypass  Morbid Obesity r- resolved   Body mass index is 29.42 kg/(m^2).   Diabetes - resolved  Hypertension - resolved  Hyperlipidemia - resolved  Post surgical malabsorption:   Ordered CBC, vitamin B12, vitamin D, PTH, ferritin, TIBC, and iron labs.   Follow food plan per dietitian recommendations.   Continue taking recommended post-op vitamins.  Return to clinic in one year.             Follow-ups after your visit        Follow-up notes from your care team     Return in about 1 year (around 11/7/2018).      Future tests that were ordered for you today     Open Future Orders        Priority Expected Expires Ordered    Vitamin D Screen Routine 11/7/2017 11/7/2018 11/7/2017    Parathyroid Hormone Intact Routine 11/7/2017 11/7/2018 11/7/2017    Iron Routine 11/7/2017 11/7/2018 11/7/2017    Iron and Iron Binding Capacity Routine 11/7/2017 11/7/2018 11/7/2017    Ferritin Routine 11/7/2017 11/7/2018 11/7/2017    CBC with platelets Routine 11/7/2017 11/7/2018 11/7/2017    Vitamin B12 Routine 11/7/2017 11/7/2018 11/7/2017            Who to contact     If you have questions or need follow up information about today's clinic visit or your schedule please contact [facility] directly at [phone].  Normal or non-critical lab and imaging results will be communicated to you by MyChart, letter or phone within 4 business days after the clinic has received the results.  If you do not hear from us within 7 days, please contact the clinic through [facility] or phone. If you have a critical or abnormal lab result, we will notify you by phone as soon as possible.  Submit refill requests through [facility] or call your pharmacy and they will forward the refill request to us. Please allow 3 business days for your refill to be completed.          Additional Information About Your Visit        [employer] Information     [facility] gives you secure access to your electronic health record. If you see a primary care provider, you can also send messages to your care team and make appointments. If you have questions, please call your primary care clinic.  If you do not have a primary care provider, please call [phone] and they will assist you.        Care EveryWhere ID     This is your Care EveryWhere ID. This could be used by other organizations to access your [facility] medical records  [id number]        Your Vitals Were     Pulse BMI (Body Mass Index)                59 29.42 kg/m2           Blood Pressure from Last 3 Encounters:   11/07/17 130/66   07/03/17 122/74   11/29/16 130/72    Weight from Last 3 Encounters:   11/07/17 226 lb 1 oz (102.5 kg)   07/03/17 223 lb 3.2 oz (101.2 kg)   11/29/16 217 lb 3.2 oz (98.5 kg)              We Performed the Following     OP ROOMING NOTE TO BIBI        Primary Care Provider Office Phone # Fax #    Philip Shah [phone] [address]  [facility] [id number]        Equal Access to Services     ADRIANNA HONEYCUTT AH: Hadii aad ku hadasho Soomaali, waaxda luqadaha, qaybta kaalmada adeegyada, waxay kandiin haybarry reilly . So [facility] [phone].    ATENCIÓN: Si habla español, tiene a kenyon disposición servicios gratuitos de asistencia lingüística. Llcliff al [phone].    We comply with applicable federal civil rights laws and [place] laws. We do not discriminate on the basis of race, color, national origin, age, disability,  sex, sexual orientation, or gender identity.            Thank you!     Thank you for choosing [facility]  for your care. Our goal is always to provide you with excellent care. Hearing back from our patients is one way we can continue to improve our services. Please take a few minutes to complete the written survey that you may receive in the mail after your visit with us. Thank you!             Your Updated Medication List - Protect others around you: Learn how to safely use, store and throw away your medicines at www.disposemymeds.org.          This list is accurate as of: 11/7/17  9:38 AM.  Always use your most recent med list.                   Brand Name Dispense Instructions for use Diagnosis    B-12 500 MCG Subl      Place 1 tablet under the tongue five times a week        CALTRATE PLUS OR      Take 400 mg by mouth 3 times daily 2 - 200 mg each meal        MULTIvitamin  S Caps      Take 2 chew tab by mouth At Bedtime.        triamcinolone 0.1 % ointment    KENALOG     as needed Ears, arms, legs        vitamin D 2000 UNITS Caps      Take by mouth daily 2- 1000 Int.l Units each meal

## 2017-11-07 NOTE — PATIENT INSTRUCTIONS
4 years status laparoscopic gastric bypass  Morbid Obesity r- resolved   Body mass index is 29.42 kg/(m^2).   Diabetes - resolved  Hypertension - resolved  Hyperlipidemia - resolved  Post surgical malabsorption:   Ordered CBC, vitamin B12, vitamin D, PTH, ferritin, TIBC, and iron labs.   Follow food plan per dietitian recommendations.   Continue taking recommended post-op vitamins.  Return to clinic in one year.

## 2017-11-07 NOTE — PROGRESS NOTES
BARIATRIC FOLLOW UP VISIT     November 7, 2017       HISTORY OF PRESENT ILLNESS: Pt returns today for his follow-up appointment status post laparoscopic gastric bypass.  Overall doing very well.  Has no concerns today.    Initial Weight: 318 lb (144.2 kg)   Current Weight: 226 lb 1 oz (102.5 kg)  Cumulative weight loss (lbs): 91.94  Last Visits Weight: 217 lb (98.4 kg)       Patient is taking the following bariatric postoperative vitamins:  2 Complete multivitamins with minerals (at different times than calcium)   2000 International Units of Vitamin D daily  1200 mg of Calcium daily in divided doses  500 mcg of Vitamin B12 sl five times weekly      Pt is exercising 5 days per week.  Rowing in the winter 20-30 minutes.  Running when warm outside for about 30-45 minutes.  Did a 5k this year for the third year in a row.  Came in second place for his age group.          OBESITY RELATED CONDITIONS:  HTN:  Resolved  DM: resolved.  Last A1C 5.5 in July 2017  Cholesterol:  Resolved.  No meds  GERD:  Resolved  Plantar fasciitis: resolved  Depression:  Resolved  Anxiety:  Resolved       SOCIAL HISTORY:  Pt denies smoking.  Pt drinks alcohol about one two times per month.  Avoids NSAIDS.  Drinks one cup every other week.      REVIEW OF SYSTEMS:     GI:  Nausea-NO  Vomiting-NO  Diarrhea-NO  Constipation-NO.  Drinks about 48 oz water with crystal light. 16-32 oz of fairlife and decaf latte  Dysphagia-NO  Abdominal Pain-NO  Heartburn-NO     SKIN:  Intertriginous irritation-NO       PSYCH:  Depression-NO  Anxiety-NO        PHYSICAL EXAMINATION:   /66  Pulse 59  Wt 226 lb 1 oz (102.5 kg)  BMI 29.42 kg/m2    GENERAL: Alert and oriented x3. NAD  HEART: No murmurs, rubs or gallops, Regular rate and rhythm  LUNGS: Breathing unlabored, Lung sounds clear to auscultation bilaterally  ABDOMEN: soft; nontender; nondistended, incision well healed. No hernia  EXTREMITIES: No LE edema bilaterally, Gait normal  SKIN: No intertriginous  irritation or rash      ASSESSMENT AND PLAN:      4 years status laparoscopic gastric bypass  Morbid Obesity r- resolved   Body mass index is 29.42 kg/(m^2).   Diabetes - resolved  Hypertension - resolved  Hyperlipidemia - resolved  Post surgical malabsorption:   Ordered CBC, vitamin B12, vitamin D, PTH, ferritin, TIBC, and iron labs.   Follow food plan per dietitian recommendations.   Continue taking recommended post-op vitamins.  Return to clinic in one year.       I spent a total of 20 minutes face to face with Ethan during today's office visit. Over 50% of this time was spent counseling the patient and/or coordinating care.

## 2017-11-07 NOTE — MR AVS SNAPSHOT
MRN:[id number]                      After Visit Summary   11/7/2017    Ethan Weiss    MRN: [id number]           Visit Information        Provider Department      11/7/2017 9:30 AM 3, Sh Hari Sheth, CARIS[facility] - [facility]      [facility] Information     Shopperceptionhart gives you secure access to your electronic health record. If you see a primary care provider, you can also send messages to your care team and make appointments. If you have questions, please call your primary care clinic.  If you do not have a primary care provider, please call [phone] and they will assist you.        Care EveryWhere ID     This is your Care EveryWhere ID. This could be used by other organizations to access your [place] medical records  [id number]        Equal Access to Services     VIOLETTE HONEYCUTT : Meka Brennan, watess luqagata, yahir kaalmaradha dee, jessika barkley. So [facility] [phone].    ATENCIÓN: Si habla español, tiene a kenyon disposición servicios gratuitos de asistencia lingüística. Llame al [phone].    We comply with applicable federal civil rights laws and [place] laws. We do not discriminate on the basis of race, color, national origin, age, disability, sex, sexual orientation, or gender identity.

## 2018-06-25 ENCOUNTER — MYC MEDICAL ADVICE (OUTPATIENT)
Dept: SURGERY | Facility: CLINIC | Age: 58
End: 2018-06-25

## 2018-06-25 NOTE — TELEPHONE ENCOUNTER
Called patient to discuss his question about how many calories he should be eating per day. Patient is s/p gastric bypass 11/18/13 and his weight has been holding in the 220 range, but he would like to get down to the 210 range. He is active 7 days per week:   rowing 3X per week for 30 minutes  walking 3X per week for 1.5 hours  [id number] per week for 30 minutes  About 1 week ago pt started tracking his intake on an rose marie and is keeping his intake at ~ 1200 kcal/ day and his weigh has remained stable. Encouraged pt to aim for ~ 1000 kcal (11 kcal/kg- based on his stated weight of ~ 226 lb). Patient verbalized understanding of this plan. Encouraged pt to call with further questions. Reminded pt about his annual visit due on November of 2018.

## 2018-07-23 ENCOUNTER — OFFICE VISIT (OUTPATIENT)
Dept: FAMILY MEDICINE | Facility: CLINIC | Age: 58
End: 2018-07-23

## 2018-07-23 VITALS
WEIGHT: 225.8 LBS | HEIGHT: 74 IN | HEART RATE: 72 BPM | DIASTOLIC BLOOD PRESSURE: 78 MMHG | BODY MASS INDEX: 28.98 KG/M2 | RESPIRATION RATE: 12 BRPM | SYSTOLIC BLOOD PRESSURE: 116 MMHG

## 2018-07-23 DIAGNOSIS — Z23 NEED FOR TDAP VACCINATION: Primary | ICD-10-CM

## 2018-07-23 DIAGNOSIS — Z00.00 ROUTINE GENERAL MEDICAL EXAMINATION AT A HEALTH CARE FACILITY: ICD-10-CM

## 2018-07-23 DIAGNOSIS — Z12.5 SCREENING FOR PROSTATE CANCER: ICD-10-CM

## 2018-07-23 DIAGNOSIS — E88.810 DYSMETABOLIC SYNDROME X: ICD-10-CM

## 2018-07-23 DIAGNOSIS — K91.2 POSTSURGICAL MALABSORPTION: ICD-10-CM

## 2018-07-23 DIAGNOSIS — Z71.89 ADVANCED DIRECTIVES, COUNSELING/DISCUSSION: ICD-10-CM

## 2018-07-23 DIAGNOSIS — Z23 NEED FOR SHINGLES VACCINE: ICD-10-CM

## 2018-07-23 PROCEDURE — 90750 HZV VACC RECOMBINANT IM: CPT | Performed by: FAMILY MEDICINE

## 2018-07-23 PROCEDURE — 83036 HEMOGLOBIN GLYCOSYLATED A1C: CPT | Mod: 90 | Performed by: FAMILY MEDICINE

## 2018-07-23 PROCEDURE — 90472 IMMUNIZATION ADMIN EACH ADD: CPT | Performed by: FAMILY MEDICINE

## 2018-07-23 PROCEDURE — 99396 PREV VISIT EST AGE 40-64: CPT | Mod: 25 | Performed by: FAMILY MEDICINE

## 2018-07-23 PROCEDURE — 90714 TD VACC NO PRESV 7 YRS+ IM: CPT | Performed by: FAMILY MEDICINE

## 2018-07-23 PROCEDURE — 80053 COMPREHEN METABOLIC PANEL: CPT | Mod: 90 | Performed by: FAMILY MEDICINE

## 2018-07-23 PROCEDURE — 36415 COLL VENOUS BLD VENIPUNCTURE: CPT | Performed by: FAMILY MEDICINE

## 2018-07-23 PROCEDURE — 90471 IMMUNIZATION ADMIN: CPT | Performed by: FAMILY MEDICINE

## 2018-07-23 PROCEDURE — 84153 ASSAY OF PSA TOTAL: CPT | Mod: 90 | Performed by: FAMILY MEDICINE

## 2018-07-23 NOTE — MR AVS SNAPSHOT
After Visit Summary   7/23/2018    Ethan Weiss    MRN: [id number]           Patient Information     Date Of Birth          1960        Visit Information        Provider Department      7/23/2018 10:30 AM Cuauhtemoc Arredondo MD [facility]        Today's Diagnoses     Need for Tdap vaccination    -  1    Routine general medical examination at a health care facility        Dysmetabolic syndrome X        Postsurgical malabsorption        Screening for prostate cancer          Care Instructions      Preventive Health Recommendations  Male Ages 50 - 64    Yearly exam:             See your health care provider every year in order to  o   Review health changes.   o   Discuss preventive care.    o   Review your medicines if your doctor has prescribed any.     Have a cholesterol test every 5 years, or more frequently if you are at risk for high cholesterol/heart disease.     Have a diabetes test (fasting glucose) every three years. If you are at risk for diabetes, you should have this test more often.     Have a colonoscopy at age 50, or have a yearly FIT test (stool test). These exams will check for colon cancer.      Talk with your health care provider about whether or not a prostate cancer screening test (PSA) is right for you.    You should be tested each year for STDs (sexually transmitted diseases), if you re at risk.     Shots: Get a flu shot each year. Get a tetanus shot every 10 years.     Nutrition:    Eat at least 5 servings of fruits and vegetables daily.     Eat whole-grain bread, whole-wheat pasta and brown rice instead of white grains and rice.     Get adequate Calcium and Vitamin D.     Lifestyle    Exercise for at least 150 minutes a week (30 minutes a day, 5 days a week). This will help you control your weight and prevent disease.     Limit alcohol to one drink per day.     No smoking.     Wear sunscreen to prevent skin cancer.     See your dentist every six months  "for an exam and cleaning.     See your eye doctor every 1 to 2 years.            Follow-ups after your visit        Who to contact     If you have questions or need follow up information about today's clinic visit or your schedule please contact [facility] directly at [phone].  Normal or non-critical lab and imaging results will be communicated to you by Outrighthart, letter or phone within 4 business days after the clinic has received the results. If you do not hear from us within 7 days, please contact the clinic through Weibut or phone. If you have a critical or abnormal lab result, we will notify you by phone as soon as possible.  Submit refill requests through MightyQuiz or call your pharmacy and they will forward the refill request to us. Please allow 3 business days for your refill to be completed.          Additional Information About Your Visit        Outrighthart Information     MightyQuiz gives you secure access to your electronic health record. If you see a primary care provider, you can also send messages to your care team and make appointments. If you have questions, please call your primary care clinic.  If you do not have a primary care provider, please call [phone] and they will assist you.        Care EveryWhere ID     This is your Care EveryWhere ID. This could be used by other organizations to access your [place] medical records  [id number]        Your Vitals Were     Pulse Respirations Height BMI (Body Mass Index)          72 12 1.88 m (6' 2\") 28.99 kg/m2         Blood Pressure from Last 3 Encounters:   07/23/18 116/78   11/07/17 130/66   07/03/17 122/74    Weight from Last 3 Encounters:   07/23/18 102.4 kg (225 lb 12.8 oz)   11/07/17 102.6 kg (226 lb 1.6 oz)   11/07/17 102.5 kg (226 lb 1 oz)              We Performed the Following     Comp. Metabolic Panel (14) ([facility])     Hemoglobin A1C ([facility])     PSA Serum ([facility])     TD PRESERV FREE, IM (7+ YRS)     ZOSTER VACCINE " RECOMBINANT ADJUVANTED IM NJX        Primary Care Provider Office Phone # Fax #    Cuauhtemoc Arredondo [phone] [address]  Formerly named [facility] [id number]        Equal Access to Services     VIOLETTE HONEYCUTT : Hadii lori ku hadmorriso Soomaali, waaxda luqadaha, qaybta kaalmada adeegyada, jessika kandiin hayaan erinwlai elizabeth laHeatherbarry barkley. So [facility] [phone].    ATENCIÓN: Si habla español, tiene a kenyon disposición servicios gratuitos de asistencia lingüística. Llame al [phone].    We comply with applicable federal civil rights laws and [place] laws. We do not discriminate on the basis of race, color, national origin, age, disability, sex, sexual orientation, or gender identity.            Thank you!     Thank you for choosing [facility]  for your care. Our goal is always to provide you with excellent care. Hearing back from our patients is one way we can continue to improve our services. Please take a few minutes to complete the written survey that you may receive in the mail after your visit with us. Thank you!             Your Updated Medication List - Protect others around you: Learn how to safely use, store and throw away your medicines at www.disposemymeds.org.          This list is accurate as of 7/23/18 11:14 AM.  Always use your most recent med list.                   Brand Name Dispense Instructions for use Diagnosis    B-12 500 MCG Subl      Place 1 tablet under the tongue five times a week        CALTRATE PLUS OR      Take 400 mg by mouth 3 times daily 2 - 200 mg each meal        MULTIvitamin  S Caps      Take 2 chew tab by mouth At Bedtime.        triamcinolone 0.1 % ointment    KENALOG     as needed Ears, arms, legs        vitamin D 2000 units Caps      Take by mouth daily 2- 1000 Int.l Units each meal

## 2018-07-23 NOTE — PROGRESS NOTES
SUBJECTIVE:   CC: Ethan Weiss is an 57 year old male who presents for preventative health visit.     Healthy Habits:    Do you get at least three servings of calcium containing foods daily (dairy, green leafy vegetables, etc.)? yes and calcium, vitamin d,  B12    Amount of exercise or daily activities, outside of work: 7 day(s) per week    Problems taking medications regularly No    Medication side effects: No    Have you had an eye exam in the past two years? yes    Do you see a dentist twice per year? yes    Do you have sleep apnea, excessive snoring or daytime drowsiness?no       Sees Bariatric clinic - Dr August Flores for vitamin monitoring has lost for 94 pounds     Fasting today  Needs Td booster    Today's PHQ-2 Score:   PHQ-2 ( 1999 Pfizer) 7/23/2018 7/3/2017   Q1: Little interest or pleasure in doing things 0 0   Q2: Feeling down, depressed or hopeless 0 0   PHQ-2 Score 0 0       Abuse: Current or Past(Physical, Sexual or Emotional)- No  Do you feel safe in your environment - Yes    Social History   Substance Use Topics     Smoking status: Never Smoker     Smokeless tobacco: Never Used     Alcohol use 0.0 - 0.6 oz/week     0 - 1 Standard drinks or equivalent per week      Comment: 1 drink per week      If you drink alcohol do you typically have >3 drinks per day or >7 drinks per week? No                      Last PSA: No results found for: PSA    Reviewed orders with patient. Reviewed health maintenance and updated orders accordingly - Yes  Patient Active Problem List   Diagnosis     Anxieties     Health Care Home     History of diabetes mellitus     Nevus     Postsurgical malabsorption     Gastric bypass status for obesity     Family history of prostate cancer     Primary osteoarthritis involving multiple joints     Overweight (BMI 25.0-29.9)     Past Surgical History:   Procedure Laterality Date     APPENDECTOMY       LAPAROSCOPIC BYPASS GASTRIC  11/18/2013    Procedure: LAPAROSCOPIC BYPASS GASTRIC;   LAPARSCOPIC MARITA-EN-Y GASTRIC BYPASS;  Surgeon: August Flores MD;  Location:  OR     SALIVARY GLAND SURGERY       TONSILLECTOMY & ADENOIDECTOMY       VARICOCELECTOMY         Social History   Substance Use Topics     Smoking status: Never Smoker     Smokeless tobacco: Never Used     Alcohol use 0.0 - 0.6 oz/week     0 - 1 Standard drinks or equivalent per week      Comment: 1 drink per week     Family History   Problem Relation Age of Onset     Diabetes Mother      Prostate Cancer Father      Cardiovascular Father      s/p valve replacement     C.A.D. Father      CAB     Coronary Artery Disease Father            Reviewed and updated as needed this visit by clinical staff  Tobacco  Allergies  Meds         Reviewed and updated as needed this visit by Provider        Past Medical History:   Diagnosis Date     Atopic eczema      Cluster headache     rare in 2016     Family history of prostate cancer     dad     Gastric bypass status for obesity      Health Care Home      History of anxiety     2016 resolved     Hyperlipidaemia LDL goal < 100      IGT (impair glucose tolerance)     history of     Obesity     s/p gastric bypass       Past Surgical History:   Procedure Laterality Date     APPENDECTOMY       LAPAROSCOPIC BYPASS GASTRIC  11/18/2013    Procedure: LAPAROSCOPIC BYPASS GASTRIC;  LAPARSCOPIC MARITA-EN-Y GASTRIC BYPASS;  Surgeon: August Flores MD;  Location:  OR     SALIVARY GLAND SURGERY       TONSILLECTOMY & ADENOIDECTOMY       VARICOCELECTOMY         ROS:  CONSTITUTIONAL: NEGATIVE for fever, chills, change in weight  INTEGUMENTARY/SKIN: NEGATIVE for worrisome rashes, moles or lesions  EYES: NEGATIVE for vision changes or irritation  ENT: NEGATIVE for ear, mouth and throat problems  RESP: NEGATIVE for significant cough or SOB  CV: NEGATIVE for chest pain, palpitations or peripheral edema  GI: NEGATIVE for nausea, abdominal pain, heartburn, or change in bowel habits   male: negative for dysuria,  "hematuria, decreased urinary stream, erectile dysfunction, urethral discharge  MUSCULOSKELETAL: NEGATIVE for significant arthralgias or myalgia  NEURO: NEGATIVE for weakness, dizziness or paresthesias  PSYCHIATRIC: NEGATIVE for changes in mood or affect    OBJECTIVE:   Ht 1.88 m (6' 2\")  Wt 102.4 kg (225 lb 12.8 oz)  BMI 28.99 kg/m2  EXAM:  GENERAL: healthy, alert and no distress  EYES: Eyes grossly normal to inspection, PERRL and conjunctivae and sclerae normal  HENT: ear canals and TM's normal, nose and mouth without ulcers or lesions  NECK: no adenopathy, no asymmetry, masses, or scars and thyroid normal to palpation  RESP: lungs clear to auscultation - no rales, rhonchi or wheezes  CV: regular rate and rhythm, normal S1 S2, no S3 or S4, no murmur, click or rub, no peripheral edema and peripheral pulses strong  ABDOMEN: soft, nontender, no hepatosplenomegaly, no masses and bowel sounds normal   (male): normal male genitalia without lesions or urethral discharge, no hernia  RECTAL: normal sphincter tone, no rectal masses, prostate normal size, smooth, nontender without nodules or masses  MS: no gross musculoskeletal defects noted, no edema  SKIN: no suspicious lesions or rashes  NEURO: Normal strength and tone, mentation intact and speech normal  PSYCH: mentation appears normal, affect normal/bright         ASSESSMENT/PLAN:   Ethan was seen today for physical and hearing screening.    Diagnoses and all orders for this visit:    Need for Tdap vaccination  -     TD PRESERV FREE, IM (7+ YRS)  -     ZOSTER VACCINE RECOMBINANT ADJUVANTED IM NJX    Routine general medical examination at a health care facility    Dysmetabolic syndrome X  -     Hemoglobin A1C ([facility])    Postsurgical malabsorption  -     Comp. Metabolic Panel (14) ([facility])    Screening for prostate cancer  -     PSA Serum ([facility])    Other orders  -     Cancel: Vitamin B12 ([facility])        COUNSELING:  Reviewed preventive health counseling, as " "reflected in patient instructions       Regular exercise       Healthy diet/nutrition    BP Readings from Last 1 Encounters:   11/07/17 130/66     Estimated body mass index is 28.99 kg/(m^2) as calculated from the following:    Height as of this encounter: 1.88 m (6' 2\").    Weight as of this encounter: 102.4 kg (225 lb 12.8 oz).    BP Screening:   Last 3 BP Readings:    BP Readings from Last 3 Encounters:   07/23/18 116/78   11/07/17 130/66   07/03/17 122/74       The following was recommended to the patient:  Re-screen BP within a year and recommended lifestyle modifications       reports that he has never smoked. He has never used smokeless tobacco.      Counseling Resources:  ATP IV Guidelines  Pooled Cohorts Equation Calculator  FRAX Risk Assessment  ICSI Preventive Guidelines  Dietary Guidelines for Americans, 2010  USDA's MyPlate  ASA Prophylaxis  Lung CA Screening    Cuauhtemoc Arredondo MD  [facility]  "

## 2018-07-24 LAB
ALBUMIN SERPL-MCNC: 4.5 G/DL (ref 3.5–5.5)
ALBUMIN/GLOB SERPL: 1.8 {RATIO} (ref 1.2–2.2)
ALP SERPL-CCNC: 71 IU/L (ref 39–117)
ALT SERPL-CCNC: 29 IU/L (ref 0–44)
AST SERPL-CCNC: 11 IU/L (ref 0–40)
BILIRUB SERPL-MCNC: 0.7 MG/DL (ref 0–1.2)
BUN SERPL-MCNC: 13 MG/DL (ref 6–24)
BUN/CREATININE RATIO: 14 (ref 9–20)
CALCIUM SERPL-MCNC: 9.2 MG/DL (ref 8.7–10.2)
CHLORIDE SERPLBLD-SCNC: 100 MMOL/L (ref 96–106)
CREAT SERPL-MCNC: 0.96 MG/DL (ref 0.76–1.27)
EGFR IF AFRICN AM: 100 ML/MIN/1.73
EGFR IF NONAFRICN AM: 87 ML/MIN/1.73
GLOBULIN, TOTAL: 2.5 G/DL (ref 1.5–4.5)
GLUCOSE SERPL-MCNC: 85 MG/DL (ref 65–99)
HBA1C MFR BLD: 5.3 % (ref 4.8–5.6)
POTASSIUM SERPL-SCNC: 4 MMOL/L (ref 3.5–5.2)
PROT SERPL-MCNC: 7 G/DL (ref 6–8.5)
PSA NG/ML: 3.4 NG/ML (ref 0–4)
SODIUM SERPL-SCNC: 143 MMOL/L (ref 134–144)
TOTAL CO2: 28 MMOL/L (ref 20–29)

## 2018-07-28 ENCOUNTER — MYC MEDICAL ADVICE (OUTPATIENT)
Dept: FAMILY MEDICINE | Facility: CLINIC | Age: 58
End: 2018-07-28

## 2018-07-28 DIAGNOSIS — N52.9 ED (ERECTILE DYSFUNCTION): ICD-10-CM

## 2018-07-28 DIAGNOSIS — R97.20 INCREASING PROSTATE SPECIFIC ANTIGEN LEVEL: Primary | ICD-10-CM

## 2018-08-06 RX ORDER — SILDENAFIL CITRATE 20 MG/1
TABLET ORAL
Qty: 30 TABLET | Refills: 4 | Status: SHIPPED | OUTPATIENT
Start: 2018-08-06 | End: 2019-07-29

## 2018-08-06 NOTE — TELEPHONE ENCOUNTER
Per Dr Arredondo patient can get rx for Sildenafil 20 mg #30.  Called patient to let him know.  Sent rx to [facility] pharmacy.

## 2018-08-21 NOTE — TELEPHONE ENCOUNTER
PA  required for Sildenafil.  [facility].  Received fax back stating PA was denied.  This medication not covered under his policy for ED.

## 2018-11-06 ENCOUNTER — OFFICE VISIT (OUTPATIENT)
Dept: SURGERY | Facility: CLINIC | Age: 58
End: 2018-11-06
Payer: COMMERCIAL

## 2018-11-06 ENCOUNTER — HOSPITAL ENCOUNTER (OUTPATIENT)
Dept: LAB | Facility: CLINIC | Age: 58
Discharge: HOME | End: 2018-11-06
Attending: PHYSICIAN ASSISTANT | Admitting: PHYSICIAN ASSISTANT
Payer: COMMERCIAL

## 2018-11-06 VITALS — WEIGHT: 229.5 LBS | BODY MASS INDEX: 29.45 KG/M2 | HEIGHT: 74 IN

## 2018-11-06 VITALS
BODY MASS INDEX: 29.45 KG/M2 | HEART RATE: 73 BPM | HEIGHT: 74 IN | OXYGEN SATURATION: 96 % | DIASTOLIC BLOOD PRESSURE: 71 MMHG | WEIGHT: 229.5 LBS | SYSTOLIC BLOOD PRESSURE: 120 MMHG | RESPIRATION RATE: 12 BRPM

## 2018-11-06 DIAGNOSIS — Z98.84 BARIATRIC SURGERY STATUS: ICD-10-CM

## 2018-11-06 DIAGNOSIS — Z98.84 BARIATRIC SURGERY STATUS: Primary | ICD-10-CM

## 2018-11-06 DIAGNOSIS — K91.2 POSTSURGICAL MALABSORPTION: ICD-10-CM

## 2018-11-06 DIAGNOSIS — E66.3 OVERWEIGHT (BMI 25.0-29.9): ICD-10-CM

## 2018-11-06 DIAGNOSIS — Z98.84 GASTRIC BYPASS STATUS FOR OBESITY: ICD-10-CM

## 2018-11-06 LAB
ERYTHROCYTE [DISTWIDTH] IN BLOOD BY AUTOMATED COUNT: 12.9 % (ref 10–15)
FERRITIN SERPL-MCNC: 48 NG/ML (ref 26–388)
HCT VFR BLD AUTO: 44.9 % (ref 40–53)
HGB BLD-MCNC: 15.6 G/DL (ref 13.3–17.7)
IRON SATN MFR SERPL: 37 % (ref 15–46)
IRON SERPL-MCNC: 116 UG/DL (ref 35–180)
MCH RBC QN AUTO: 30.4 PG (ref 26.5–33)
MCHC RBC AUTO-ENTMCNC: 34.7 G/DL (ref 31.5–36.5)
MCV RBC AUTO: 87 FL (ref 78–100)
PLATELET # BLD AUTO: 162 10E9/L (ref 150–450)
PTH-INTACT SERPL-MCNC: 40 PG/ML (ref 12–64)
RBC # BLD AUTO: 5.14 10E12/L (ref 4.4–5.9)
TIBC SERPL-MCNC: 317 UG/DL (ref 240–430)
VIT B12 SERPL-MCNC: 926 PG/ML (ref 193–986)
WBC # BLD AUTO: 6.2 10E9/L (ref 4–11)

## 2018-11-06 PROCEDURE — 36415 COLL VENOUS BLD VENIPUNCTURE: CPT | Performed by: PHYSICIAN ASSISTANT

## 2018-11-06 PROCEDURE — 82728 ASSAY OF FERRITIN: CPT | Performed by: PHYSICIAN ASSISTANT

## 2018-11-06 PROCEDURE — 82306 VITAMIN D 25 HYDROXY: CPT | Performed by: PHYSICIAN ASSISTANT

## 2018-11-06 PROCEDURE — 83970 ASSAY OF PARATHORMONE: CPT | Performed by: PHYSICIAN ASSISTANT

## 2018-11-06 PROCEDURE — 85027 COMPLETE CBC AUTOMATED: CPT | Performed by: PHYSICIAN ASSISTANT

## 2018-11-06 PROCEDURE — 82607 VITAMIN B-12: CPT | Performed by: PHYSICIAN ASSISTANT

## 2018-11-06 PROCEDURE — 99213 OFFICE O/P EST LOW 20 MIN: CPT | Performed by: PHYSICIAN ASSISTANT

## 2018-11-06 PROCEDURE — 97803 MED NUTRITION INDIV SUBSEQ: CPT | Performed by: DIETITIAN, REGISTERED

## 2018-11-06 PROCEDURE — 83550 IRON BINDING TEST: CPT | Performed by: PHYSICIAN ASSISTANT

## 2018-11-06 PROCEDURE — 83540 ASSAY OF IRON: CPT | Performed by: PHYSICIAN ASSISTANT

## 2018-11-06 NOTE — PROGRESS NOTES
"BARIATRIC FOLLOW UP VISIT     November 6, 2018       HISTORY OF PRESENT ILLNESS: Pt returns today for his follow-up appointment status post laparoscopic gastric bypass. His job has moved entirely to [place] in the past few weeks which he is excited about. He is planning to become the  of the house and become more active over the next few months.    Initial Weight: 318 lb (144.2 kg)   Current Weight: 229 lb 8 oz (104.1 kg)  Cumulative weight loss (lbs): 88.5  Last Visits Weight: 226 lb (102.5 kg)     Patient is taking the following bariatric postoperative vitamins:  2 Complete multivitamins with minerals (at different times than calcium)   4000 International Units of Vitamin D daily  1200 mg of Calcium daily in divided doses  500 mcg of Vitamin B12 sl  Five days per week      Pt is exercising by walking daily. Some days will have a couple walks.  Still adjusting to move back from [place] will be finding additional ways to exercise       OBESITY RELATED CONDITIONS:  HTN:  Resolved  DM: resolved.  Last A1C 5.3 in July 2018  Cholesterol:  Resolved.  No meds  GERD:  Resolved  Plantar fasciitis: resolved  Depression:  Resolved  Anxiety:  Resolved       SOCIAL HISTORY:  Pt denies smoking.  Pt denies alcohol use.  Avoids NSAIDS.        REVIEW OF SYSTEMS:     GI:  Nausea- No  Vomiting- No  Diarrhea- No  Constipation- No.  16 + oz of fairlife with decaf lattes. Gets in 48 oz water  Dysphagia- No  Abdominal Pain- No  Heartburn- No     SKIN:  Intertriginous irritation- No       PSYCH:  Depression- No  Anxiety- No      LABS/IMAGING/MEDICAL RECORDS REVIEW: Last years labs    PHYSICAL EXAMINATION:   /71 (BP Location: Right arm, Patient Position: Sitting, Cuff Size: Adult Large)  Pulse 73  Resp 12  Ht 6' 2\" (1.88 m)  Wt 229 lb 8 oz (104.1 kg)  SpO2 96%  BMI 29.47 kg/m2    GENERAL: Alert and oriented x3. NAD  HEART: No murmurs, rubs or gallops, Regular rate and rhythm  LUNGS: Breathing unlabored, Lung sounds clear to " auscultation bilaterally  ABDOMEN: soft; nontender; nondistended, incision well healed. No hernia  EXTREMITIES: No LE edema bilaterally, Gait normal  SKIN: No intertriginous irritation or rash      ASSESSMENT AND PLAN:      5 years status laparoscopic gastric bypass  Morbid Obesity - resolved Body mass index is 29.47 kg/(m^2)..  Post surgical malabsorption:   Ordered CBC, vitamin B12, vitamin D, PTH, ferritin, TIBC, and iron labs.   Follow food plan per dietitian recommendations.   Continue taking recommended post-op vitamins.  Discussed the importance of increasing physical activity to promote weight loss.  Prediabetes - resolved  DEXA Scan ordered due to malabsorption  Return to clinic in 1 year.      I spent a total of 14 minutes face to face with Ethan during today's office visit. Over 50% of this time was spent counseling the patient and/or coordinating care.

## 2018-11-06 NOTE — PATIENT INSTRUCTIONS
5 years status laparoscopic gastric bypass  Morbid Obesity - resolved Body mass index is 29.47 kg/(m^2)..  Post surgical malabsorption:   Ordered CBC, vitamin B12, vitamin D, PTH, ferritin, TIBC, and iron labs.   Follow food plan per dietitian recommendations.   Continue taking recommended post-op vitamins.  Discussed the importance of increasing physical activity to promote weight loss.  Prediabetes - resolved  DEXA Scan ordered due to malabsorption  Return to clinic in 1 year.

## 2018-11-06 NOTE — MR AVS SNAPSHOT
After Visit Summary   11/6/2018    Ethan Weiss    MRN: [id number]           Patient Information     Date Of Birth          1960        Visit Information        Provider Department      11/6/2018 9:00 AM Jann Arteaga PA-C [facility]      Today's Diagnoses     Bariatric surgery status    -  1    Postsurgical malabsorption        Overweight (BMI 25.0-29.9)          Care Instructions    5 years status laparoscopic gastric bypass  Morbid Obesity - resolved Body mass index is 29.47 kg/(m^2)..  Post surgical malabsorption:   Ordered CBC, vitamin B12, vitamin D, PTH, ferritin, TIBC, and iron labs.   Follow food plan per dietitian recommendations.   Continue taking recommended post-op vitamins.  Discussed the importance of increasing physical activity to promote weight loss.  Prediabetes - resolved  DEXA Scan ordered due to malabsorption  Return to clinic in 1 year.            Follow-ups after your visit        Future tests that were ordered for you today     Open Future Orders        Priority Expected Expires Ordered    CBC with platelets Routine 11/6/2018 11/6/2019 11/6/2018    Vitamin B12 Routine 11/6/2018 11/6/2019 11/6/2018    Vitamin D Screen Routine 11/6/2018 11/6/2019 11/6/2018    Parathyroid Hormone Intact Routine 11/6/2018 11/6/2019 11/6/2018    Iron Routine 11/6/2018 11/6/2019 11/6/2018    Iron and Iron Binding Capacity Routine 11/6/2018 11/6/2019 11/6/2018    Ferritin Routine 11/6/2018 11/6/2019 11/6/2018    DX Hip/Pelvis/Spine Routine  11/6/2019 11/6/2018            Who to contact     If you have questions or need follow up information about today's clinic visit or your schedule please contact [facility] directly at [phone].  Normal or non-critical lab and imaging results will be communicated to you by MyChart, letter or phone within 4 business days after the  "clinic has received the results. If you do not hear from us within 7 days, please contact the clinic through icomasoft or phone. If you have a critical or abnormal lab result, we will notify you by phone as soon as possible.  Submit refill requests through icomasoft or call your pharmacy and they will forward the refill request to us. Please allow 3 business days for your refill to be completed.          Additional Information About Your Visit        [employer] Information     icomasoft gives you secure access to your electronic health record. If you see a primary care provider, you can also send messages to your care team and make appointments. If you have questions, please call your primary care clinic.  If you do not have a primary care provider, please call [phone] and they will assist you.        Care EveryWhere ID     This is your Care EveryWhere ID. This could be used by other organizations to access your [facility] medical records  [id number]        Your Vitals Were     Pulse Respirations Height Pulse Oximetry BMI (Body Mass Index)       73 12 6' 2\" (1.88 m) 96% 29.47 kg/m2        Blood Pressure from Last 3 Encounters:   11/06/18 120/71   07/23/18 116/78   11/07/17 130/66    Weight from Last 3 Encounters:   11/06/18 229 lb 8 oz (104.1 kg)   11/06/18 229 lb 8 oz (104.1 kg)   07/23/18 225 lb 12.8 oz (102.4 kg)               Primary Care Provider Office Phone # Fax #    Cuauhtemoc Arredondo [phone] [address]  [facility] [address]        Equal Access to Services     [facility]: Hadii aad ku hadasho Soomaali, waaxda luqadaha, qaybta kaalmada jessika dee . So [facility] [phone].    ATENCIÓN: Si habla español, tiene a kenyon disposición servicios gratuitos de asistencia lingüística. Llame al [phone].    We comply with applicable federal civil rights laws and [place] laws. We do not discriminate on the basis of race, color, national " origin, age, disability, sex, sexual orientation, or gender identity.            Thank you!     Thank you for choosing [facility]  for your care. Our goal is always to provide you with excellent care. Hearing back from our patients is one way we can continue to improve our services. Please take a few minutes to complete the written survey that you may receive in the mail after your visit with us. Thank you!             Your Updated Medication List - Protect others around you: Learn how to safely use, store and throw away your medicines at www.disposemymeds.org.          This list is accurate as of 11/6/18  9:43 AM.  Always use your most recent med list.                   Brand Name Dispense Instructions for use Diagnosis    B-12 500 MCG Subl      Place 1 tablet under the tongue five times a week        CALTRATE PLUS OR      Take 400 mg by mouth 3 times daily 2 - 200 mg each meal        MULTIvitamin  S Caps      Take 2 chew tab by mouth At Bedtime.        sildenafil 20 MG tablet    REVATIO    30 tablet    Take 1-3 tablets when needed.    ED (erectile dysfunction)       triamcinolone 0.1 % ointment    KENALOG     as needed Ears, arms, legs        vitamin D 2000 units Caps      Take by mouth daily 2- 1000 Int.l Units each meal

## 2018-11-06 NOTE — MR AVS SNAPSHOT
MRN:[id number]                      After Visit Summary   11/6/2018    Ethan Weiss    MRN: [id number]           Visit Information        Provider Department      11/6/2018 9:30 AM 3, Sh Hari Sheth, CARISSA [facility]      [place] Information     [employer] gives you secure access to your electronic health record. If you see a primary care provider, you can also send messages to your care team and make appointments. If you have questions, please call your primary care clinic.  If you do not have a primary care provider, please call [phone] and they will assist you.        Care EveryWhere ID     This is your Care EveryWhere ID. This could be used by other organizations to access your [facility] medical records  [id number]        Equal Access to Services     VIOLETTE HONEYCUTT : Meka Brennan, watess luqagata, yahir kaalmaradha dee, jessika barkley. So [facility] [phone].    ATENCIÓN: Si habla español, tiene a kenyon disposición servicios gratuitos de asistencia lingüística. Llame al [phone].    We comply with applicable federal civil rights laws and [place] laws. We do not discriminate on the basis of race, color, national origin, age, disability, sex, sexual orientation, or gender identity.

## 2018-11-06 NOTE — PROGRESS NOTES
"NUTRITION POST OP APPOINTMENT  DATE OF VISIT: November 6, 2018    Ethan Weiss  1960  male  [id number]  57 year old     ASSESSMENT:    REASON FOR VISIT:  Ethan is a 57 year old year old male presents today for 5 year PO nutrition follow-up appointment. Patient is accompanied by self.    DIAGNOSIS:  Status post gastric bypass surgery.  Obesity Overweight BMI 25-29.9     ANTHROPOMETRICS:  Initial Weight: 318 lbs     Height: 188 cm (6' 2\")  Current Weight: 104.1 kg (229 lb 8 oz)   BMI: 29.53 kg/(m^2).    VITAMINS AND MINERALS:  2 Multivitamin with Minerals (AM)  400 mg Calcium With Vitamin D TID (lunch, dinner, bedtime)  4000 International units Vitamin D  500 mcg Vitamin B-12 sublingual  5x/week  No iron needed per clinic guidelines    NUTRITION HISTORY:  Breakfast: (2-3h after waking) yogurt with muesli  oatmeal w/[employer] milk + peanut butter + honey + 1/2 banana  Lunch: leftovers from dinner  soup   Supper: pork kassidy + black bean chili  fish soup  recipes from [employer]   Snacks: out of boredom - crackers, apples  (2x/day - afternoon and evenings)  Fluids consumed: Water, decaf latte with [employer] milk, tea (decaf usually)  Consuming liquid calories: Yes  Protein intake: 60 grams/day  Tolerate regular texture food: Yes  Any foods not tolerated details: Yes  If any food not tolerated: beef  Portion size: 1 cup or more (1.5-2 cups)  Take 20-30 minutes to consume each meal: No/Sometimes  Eat protein foods first: Yes  Fluids and meals separate by at least 30 minutes: Yes/Usually  Chew foods thoroughly: No/Sometimes  Tolerating diet: Yes  Drinking high protein supplements: No  Consuming snacks per day: 2  Additional Information: Pt feeling well and happy with current weight. Discussed the importance of following all guidelines for long-term success.       PHYSICAL ACTIVITY:  Type: walking dogs  Frequency (days per week): 4  Duration (min): 30-90    DIAGNOSIS:  Previous Nutrition Diagnosis: Altered " gastrointestinal function related to alteration in gastrointestinal structure as evidenced by history of gastric bypass surgery.- no change    Previous goals:  Do not exceed 1 cup per meal - not met  Do not snack on solid foods between meals - not met  Separate fluids and meals by 30 minutes. - partially met  Aim for 1000-1200kcals/day, if tracking - met    Current Nutrition Diagnosis: Altered gastrointestinal function related to alteration in gastrointestinal structure as evidenced by history of gastric bypass surgery.    INTERVENTION:   Nutrition Prescription: Eat 3 meals a day at regular intervals. Consume 60-90 grams of protein daily. Follow post-surgical vitamin and mineral protocol.  Assessed learning needs and learning preferences.    GOALS:  Aim for 1 cup portions  Replace solid snacks with milk  Aim for 600-800 kcals for weight loss; 800-1000 kcals for weight maintenance. Ok to have occasional days up to 1200 kcals as long as weight stays stable.   Continue to practice all post-op guidelines    Follow-Up:   Recommend annual follow up visits to assist with lifestyle changes or per insurance.  Implementation: Discussed progress toward previous goals; reinforced importance of following bariatric lifestyle changes.    NUTRITION MONITORING AND EVALUATION:  Anticipated compliance: fair-good  Verbalized fair-good understanding.    Follow up: Patient to follow up in 12 months.    TIME SPENT WITH PATIENT:  25 minutes    Sammie Carpio RD, LD  Clinical Dietitian

## 2018-11-07 LAB — DEPRECATED CALCIDIOL+CALCIFEROL SERPL-MC: 80 UG/L (ref 20–75)

## 2018-11-08 DIAGNOSIS — Z98.84 BARIATRIC SURGERY STATUS: ICD-10-CM

## 2018-11-08 DIAGNOSIS — K91.2 POSTSURGICAL MALABSORPTION: ICD-10-CM

## 2018-12-04 DIAGNOSIS — R97.20 INCREASING PROSTATE SPECIFIC ANTIGEN LEVEL: ICD-10-CM

## 2018-12-04 PROCEDURE — 84153 ASSAY OF PSA TOTAL: CPT | Mod: 90 | Performed by: FAMILY MEDICINE

## 2018-12-04 PROCEDURE — 36415 COLL VENOUS BLD VENIPUNCTURE: CPT | Performed by: FAMILY MEDICINE

## 2018-12-04 NOTE — NURSING NOTE
Open orders for recheck of PSA per Arnulfo from July 2018  Yvonne Walter MA December 4, 2018 3:10 PM

## 2018-12-05 LAB — PSA NG/ML: 3.4 NG/ML (ref 0–4)

## 2018-12-10 DIAGNOSIS — R97.20 RISING PSA LEVEL: Primary | ICD-10-CM

## 2018-12-10 DIAGNOSIS — Z80.42 FAMILY HISTORY OF PROSTATE CANCER: ICD-10-CM

## 2018-12-11 NOTE — PROGRESS NOTES
Patient viewed Dr. Arredondo's mychart result note on 12/7/18. Future PSA order placed.  Fela Murphy RN    ------    Notes Recorded by Cuauhtemoc Arredondo MD on 12/7/2018 at 9:07 AM  PSA has not risen, lets recheck in 4 more months.  KN

## 2018-12-13 ENCOUNTER — ANCILLARY PROCEDURE (OUTPATIENT)
Dept: BONE DENSITY | Facility: CLINIC | Age: 58
End: 2018-12-13
Attending: PHYSICIAN ASSISTANT
Payer: COMMERCIAL

## 2018-12-13 DIAGNOSIS — Z98.84 BARIATRIC SURGERY STATUS: ICD-10-CM

## 2018-12-13 DIAGNOSIS — K91.2 POSTSURGICAL MALABSORPTION: ICD-10-CM

## 2018-12-13 PROCEDURE — 77080 DXA BONE DENSITY AXIAL: CPT | Performed by: INTERNAL MEDICINE

## 2019-02-13 DIAGNOSIS — Z98.84 BARIATRIC SURGERY STATUS: ICD-10-CM

## 2019-02-13 DIAGNOSIS — K91.2 POSTSURGICAL MALABSORPTION: ICD-10-CM

## 2019-02-13 LAB
DEPRECATED CALCIDIOL+CALCIFEROL SERPL-MC: 64 UG/L (ref 20–75)
PTH-INTACT SERPL-MCNC: 26 PG/ML (ref 18–80)

## 2019-02-13 PROCEDURE — 82306 VITAMIN D 25 HYDROXY: CPT | Performed by: PHYSICIAN ASSISTANT

## 2019-02-13 PROCEDURE — 83970 ASSAY OF PARATHORMONE: CPT | Performed by: PHYSICIAN ASSISTANT

## 2019-02-13 PROCEDURE — 36415 COLL VENOUS BLD VENIPUNCTURE: CPT | Performed by: PHYSICIAN ASSISTANT

## 2019-04-10 DIAGNOSIS — Z80.42 FAMILY HISTORY OF PROSTATE CANCER: ICD-10-CM

## 2019-04-10 DIAGNOSIS — R97.20 RISING PSA LEVEL: ICD-10-CM

## 2019-04-10 PROCEDURE — 84153 ASSAY OF PSA TOTAL: CPT | Mod: 90 | Performed by: FAMILY MEDICINE

## 2019-04-10 PROCEDURE — 36415 COLL VENOUS BLD VENIPUNCTURE: CPT | Performed by: FAMILY MEDICINE

## 2019-04-10 NOTE — LETTER
[facility] [address] [place]  [address]  Phone: [phone]    April 12, 2019      Ethan Weiss  [address]  [facility] [address]              Dear Ethan,    I am writing to report that your included test results are within expected ranges. I do not suggest that we make any changes at this time.         Sincerely,     Cuauhtemoc Arredondo M.D.    Results for orders placed or performed in visit on 04/10/19   PSA Serum ([facility])   Result Value Ref Range    PSA NG/ML 3.2 0.0 - 4.0 ng/mL    Narrative    Performed at:  01 - [facility] [address], [place], [place]  [id number]  : Myron Morgan MD, Phone:  [phone]

## 2019-04-11 LAB — PSA NG/ML: 3.2 NG/ML (ref 0–4)

## 2019-04-11 NOTE — RESULT ENCOUNTER NOTE
Dear Ethan,   I am writing to report that your included test results are within expected ranges. I do not suggest that we make any changes at this time.  Cuauhtemoc Arredondo M.D.

## 2019-07-29 ENCOUNTER — OFFICE VISIT (OUTPATIENT)
Dept: FAMILY MEDICINE | Facility: CLINIC | Age: 59
End: 2019-07-29

## 2019-07-29 VITALS
WEIGHT: 244 LBS | HEIGHT: 74 IN | HEART RATE: 64 BPM | OXYGEN SATURATION: 97 % | RESPIRATION RATE: 16 BRPM | DIASTOLIC BLOOD PRESSURE: 74 MMHG | SYSTOLIC BLOOD PRESSURE: 126 MMHG | BODY MASS INDEX: 31.32 KG/M2

## 2019-07-29 DIAGNOSIS — Z00.00 ROUTINE GENERAL MEDICAL EXAMINATION AT A HEALTH CARE FACILITY: Primary | ICD-10-CM

## 2019-07-29 DIAGNOSIS — K91.2 POSTSURGICAL MALABSORPTION: ICD-10-CM

## 2019-07-29 DIAGNOSIS — N52.9 ERECTILE DYSFUNCTION, UNSPECIFIED ERECTILE DYSFUNCTION TYPE: ICD-10-CM

## 2019-07-29 DIAGNOSIS — E88.810 DYSMETABOLIC SYNDROME X: ICD-10-CM

## 2019-07-29 DIAGNOSIS — R97.20 INCREASING PROSTATE SPECIFIC ANTIGEN LEVEL: ICD-10-CM

## 2019-07-29 LAB
% GRANULOCYTES: 73.3 % (ref 42.2–75.2)
HCT VFR BLD AUTO: 44 % (ref 39–51)
HEMOGLOBIN: 15 G/DL (ref 13.4–17.5)
LYMPHOCYTES NFR BLD AUTO: 19.8 % (ref 20.5–51.1)
MCH RBC QN AUTO: 29.8 PG (ref 27–31)
MCHC RBC AUTO-ENTMCNC: 34.2 G/DL (ref 33–37)
MCV RBC AUTO: 87.2 FL (ref 80–100)
MONOCYTES NFR BLD AUTO: 6.9 % (ref 1.7–9.3)
PLATELET # BLD AUTO: 181 K/UL (ref 140–450)
RBC # BLD AUTO: 5.04 X10/CMM (ref 4.2–5.9)
WBC # BLD AUTO: 5.2 X10/CMM (ref 3.8–11)

## 2019-07-29 PROCEDURE — 36415 COLL VENOUS BLD VENIPUNCTURE: CPT | Performed by: FAMILY MEDICINE

## 2019-07-29 PROCEDURE — 99396 PREV VISIT EST AGE 40-64: CPT | Performed by: FAMILY MEDICINE

## 2019-07-29 PROCEDURE — 85025 COMPLETE CBC W/AUTO DIFF WBC: CPT | Performed by: FAMILY MEDICINE

## 2019-07-29 PROCEDURE — 80053 COMPREHEN METABOLIC PANEL: CPT | Mod: 90 | Performed by: FAMILY MEDICINE

## 2019-07-29 PROCEDURE — 80061 LIPID PANEL: CPT | Mod: 90 | Performed by: FAMILY MEDICINE

## 2019-07-29 PROCEDURE — 84153 ASSAY OF PSA TOTAL: CPT | Mod: 90 | Performed by: FAMILY MEDICINE

## 2019-07-29 RX ORDER — SILDENAFIL CITRATE 20 MG/1
TABLET ORAL
Qty: 30 TABLET | Refills: 4 | Status: SHIPPED | OUTPATIENT
Start: 2019-07-29 | End: 2019-11-20

## 2019-07-29 ASSESSMENT — MIFFLIN-ST. JEOR: SCORE: 1991.53

## 2019-07-29 NOTE — PROGRESS NOTES
SUBJECTIVE:   CC: Ethan Weiss is an 58 year old male who presents for preventive health visit.     Healthy Habits:    Do you get at least three servings of calcium containing foods daily (dairy, green leafy vegetables, etc.)? yes    Amount of exercise or daily activities, outside of work: 7 day(s) per week    Problems taking medications regularly No    Medication side effects: No    Have you had an eye exam in the past two years? no    Do you see a dentist twice per year? yes    Do you have sleep apnea, excessive snoring or daytime drowsiness?no    Today's PHQ-2 Score:   PHQ-2 ( 1999 Pfizer) 7/29/2019 7/23/2018   Q1: Little interest or pleasure in doing things 0 0   Q2: Feeling down, depressed or hopeless 0 0   PHQ-2 Score 0 0       Abuse: Current or Past(Physical, Sexual or Emotional)- No  Do you feel safe in your environment? Yes    Social History     Tobacco Use     Smoking status: Never Smoker     Smokeless tobacco: Never Used   Substance Use Topics     Alcohol use: Yes     Alcohol/week: 0.0 - 0.6 oz     Comment: 1 drink per week     If you drink alcohol do you typically have >3 drinks per day or >7 drinks per week? No                      Last PSA: No results found for: PSA    gaingin weight again.  Long discussion      Taking Viagra as needed for erectile dysfunction.  He is satisfied with the current dose and effect.  He understands the contraindication with the use of any Nitrates.  He has not experienced any significant side effects of this medication. He would like to continue on it.    Reviewed orders with patient. Reviewed health maintenance and updated orders accordingly - Yes  Patient Active Problem List   Diagnosis     Anxieties     Health Care Home     History of diabetes mellitus     Nevus     Postsurgical malabsorption     Gastric bypass status for obesity     Family history of prostate cancer     Primary osteoarthritis involving multiple joints     Overweight (BMI 25.0-29.9)     Advanced  directives, counseling/discussion     Past Surgical History:   Procedure Laterality Date     APPENDECTOMY  1970     LAPAROSCOPIC BYPASS GASTRIC  11/18/2013    Procedure: LAPAROSCOPIC BYPASS GASTRIC;  LAPARSCOPIC MARITA-EN-Y GASTRIC BYPASS;  Surgeon: August Flores MD;  Location:  OR     SALIVARY GLAND SURGERY       TONSILLECTOMY & ADENOIDECTOMY       VARICOCELECTOMY         Social History     Tobacco Use     Smoking status: Never Smoker     Smokeless tobacco: Never Used   Substance Use Topics     Alcohol use: Yes     Alcohol/week: 0.0 - 0.6 oz     Comment: 1 drink per week     Family History   Problem Relation Age of Onset     Diabetes Mother      Prostate Cancer Father      Cardiovascular Father         s/p valve replacement     C.A.D. Father         CAB     Coronary Artery Disease Father      Unknown/Adopted Son            Reviewed and updated as needed this visit by clinical staff  Tobacco  Allergies  Meds         Reviewed and updated as needed this visit by Provider        Past Medical History:   Diagnosis Date     Atopic eczema      Cluster headache     rare in 2016     Family history of prostate cancer     dad     Gastric bypass status for obesity      Health Care Home      History of anxiety     2016 resolved     Hyperlipidaemia LDL goal < 100      IGT (impair glucose tolerance)     history of     Obesity     s/p gastric bypass       Past Surgical History:   Procedure Laterality Date     APPENDECTOMY  1970     LAPAROSCOPIC BYPASS GASTRIC  11/18/2013    Procedure: LAPAROSCOPIC BYPASS GASTRIC;  LAPARSCOPIC MARITA-EN-Y GASTRIC BYPASS;  Surgeon: August Flores MD;  Location:  OR     SALIVARY GLAND SURGERY       TONSILLECTOMY & ADENOIDECTOMY       VARICOCELECTOMY         ROS:  CONSTITUTIONAL: NEGATIVE for fever, chills, change in weight  INTEGUMENTARY/SKIN: NEGATIVE for worrisome rashes, moles or lesions  EYES: NEGATIVE for vision changes or irritation  ENT: NEGATIVE for ear, mouth and throat  "problems  RESP: NEGATIVE for significant cough or SOB  CV: NEGATIVE for chest pain, palpitations or peripheral edema  GI: NEGATIVE for nausea, abdominal pain, heartburn, or change in bowel habits   male: negative for dysuria, hematuria, decreased urinary stream, erectile dysfunction, urethral discharge  MUSCULOSKELETAL: NEGATIVE for significant arthralgias or myalgia  NEURO: NEGATIVE for weakness, dizziness or paresthesias  PSYCHIATRIC: NEGATIVE for changes in mood or affect     OBJECTIVE:   /74   Pulse 64   Resp 16   Ht 1.88 m (6' 2\")   Wt 110.7 kg (244 lb)   SpO2 97%   BMI 31.33 kg/m    EXAM:  GENERAL: healthy, alert and no distress  EYES: Eyes grossly normal to inspection, PERRL and conjunctivae and sclerae normal  HENT: ear canals and TM's normal, nose and mouth without ulcers or lesions  NECK: no adenopathy, no asymmetry, masses, or scars and thyroid normal to palpation  RESP: lungs clear to auscultation - no rales, rhonchi or wheezes  CV: regular rate and rhythm, normal S1 S2, no S3 or S4, no murmur, click or rub, no peripheral edema and peripheral pulses strong  ABDOMEN: soft, nontender, no hepatosplenomegaly, no masses and bowel sounds normal   (male): normal male genitalia without lesions or urethral discharge, no hernia  RECTAL: normal sphincter tone, no rectal masses, prostate normal size, smooth, nontender without nodules or masses  MS: no gross musculoskeletal defects noted, no edema  SKIN: no suspicious lesions or rashes  NEURO: Normal strength and tone, mentation intact and speech normal  PSYCH: mentation appears normal, affect normal/bright    Diagnostic Test Results:  Labs reviewed in Epic    ASSESSMENT/PLAN:   Ethan was seen today for physical and hearing screening.    Diagnoses and all orders for this visit:    Routine general medical examination at a health care facility    Erectile dysfunction, unspecified erectile dysfunction type  -     sildenafil (REVATIO) 20 MG tablet; Take " "1-3 tablets when needed.  -     PSA Serum ([facility])    Increasing prostate specific antigen level    Dysmetabolic syndrome X  -     Comp. Metabolic Panel (14) ([facility])  -     Lipid Panel ([facility])    Postsurgical malabsorption  -     CBC with Diff/Plt ([facility])        COUNSELING:  Reviewed preventive health counseling, as reflected in patient instructions       Regular exercise       Healthy diet/nutrition    Estimated body mass index is 31.33 kg/m  as calculated from the following:    Height as of this encounter: 1.88 m (6' 2\").    Weight as of this encounter: 110.7 kg (244 lb).    Weight management plan: Discussed healthy diet and exercise guidelines     reports that he has never smoked. He has never used smokeless tobacco.      Counseling Resources:  ATP IV Guidelines  Pooled Cohorts Equation Calculator  FRAX Risk Assessment  ICSI Preventive Guidelines  Dietary Guidelines for Americans, 2010  USDA's MyPlate  ASA Prophylaxis  Lung CA Screening    Cuauhtemoc Arredondo MD  [facility]  "

## 2019-07-30 LAB
ALBUMIN SERPL-MCNC: 4.6 G/DL (ref 3.5–5.5)
ALBUMIN/GLOB SERPL: 2.3 {RATIO} (ref 1.2–2.2)
ALP SERPL-CCNC: 63 IU/L (ref 39–117)
ALT SERPL-CCNC: 38 IU/L (ref 0–44)
AST SERPL-CCNC: 18 IU/L (ref 0–40)
BILIRUB SERPL-MCNC: 0.6 MG/DL (ref 0–1.2)
BUN SERPL-MCNC: 14 MG/DL (ref 6–24)
BUN/CREATININE RATIO: 17 (ref 9–20)
CALCIUM SERPL-MCNC: 8.8 MG/DL (ref 8.7–10.2)
CHLORIDE SERPLBLD-SCNC: 103 MMOL/L (ref 96–106)
CHOLEST SERPL-MCNC: 163 MG/DL (ref 100–199)
CREAT SERPL-MCNC: 0.81 MG/DL (ref 0.76–1.27)
EGFR IF AFRICN AM: 112 ML/MIN/1.73
EGFR IF NONAFRICN AM: 97 ML/MIN/1.73
GLOBULIN, TOTAL: 2 G/DL (ref 1.5–4.5)
GLUCOSE SERPL-MCNC: 91 MG/DL (ref 65–99)
HDLC SERPL-MCNC: 51 MG/DL
LDL/HDL RATIO: 1.9 RATIO (ref 0–3.6)
LDLC SERPL CALC-MCNC: 95 MG/DL (ref 0–99)
POTASSIUM SERPL-SCNC: 4.3 MMOL/L (ref 3.5–5.2)
PROT SERPL-MCNC: 6.6 G/DL (ref 6–8.5)
PSA NG/ML: 3.3 NG/ML (ref 0–4)
SODIUM SERPL-SCNC: 142 MMOL/L (ref 134–144)
TOTAL CO2: 29 MMOL/L (ref 20–29)
TRIGL SERPL-MCNC: 83 MG/DL (ref 0–149)
VLDLC SERPL CALC-MCNC: 17 MG/DL (ref 5–40)

## 2019-08-05 ENCOUNTER — TELEPHONE (OUTPATIENT)
Dept: FAMILY MEDICINE | Facility: CLINIC | Age: 59
End: 2019-08-05

## 2019-08-05 ENCOUNTER — MYC MEDICAL ADVICE (OUTPATIENT)
Dept: FAMILY MEDICINE | Facility: CLINIC | Age: 59
End: 2019-08-05

## 2019-08-05 NOTE — TELEPHONE ENCOUNTER
Submitted PA for Sildenafil  through [employer] on 07/30 -faxed form.   Received fax back with denial of PA.  States only covered with diagnosis of pulmonary arterial hypertension.  Called and notified patient of denial.

## 2019-08-05 NOTE — TELEPHONE ENCOUNTER
Called patient to inform him that PA for Sildenafil was denied.  He will call insurance to see if any medication in this class is covered or he will check out cash price.

## 2019-10-05 ENCOUNTER — HEALTH MAINTENANCE LETTER (OUTPATIENT)
Age: 59
End: 2019-10-05

## 2019-11-19 ENCOUNTER — MYC MEDICAL ADVICE (OUTPATIENT)
Dept: SURGERY | Facility: CLINIC | Age: 59
End: 2019-11-19

## 2019-11-20 ENCOUNTER — OFFICE VISIT (OUTPATIENT)
Dept: SURGERY | Facility: CLINIC | Age: 59
End: 2019-11-20
Payer: COMMERCIAL

## 2019-11-20 ENCOUNTER — HOSPITAL ENCOUNTER (OUTPATIENT)
Dept: LAB | Facility: CLINIC | Age: 59
Discharge: HOME | End: 2019-11-20
Attending: PHYSICIAN ASSISTANT | Admitting: PHYSICIAN ASSISTANT
Payer: COMMERCIAL

## 2019-11-20 VITALS
OXYGEN SATURATION: 97 % | DIASTOLIC BLOOD PRESSURE: 65 MMHG | HEIGHT: 74 IN | SYSTOLIC BLOOD PRESSURE: 116 MMHG | WEIGHT: 243.1 LBS | BODY MASS INDEX: 31.2 KG/M2 | HEART RATE: 70 BPM

## 2019-11-20 VITALS — BODY MASS INDEX: 31.2 KG/M2 | HEIGHT: 74 IN | WEIGHT: 243.1 LBS

## 2019-11-20 DIAGNOSIS — Z98.84 GASTRIC BYPASS STATUS FOR OBESITY: ICD-10-CM

## 2019-11-20 DIAGNOSIS — K91.2 POSTSURGICAL MALABSORPTION: ICD-10-CM

## 2019-11-20 DIAGNOSIS — Z98.84 WEIGHT GAIN FOLLOWING GASTRIC BYPASS SURGERY: ICD-10-CM

## 2019-11-20 DIAGNOSIS — Z98.84 BARIATRIC SURGERY STATUS: Primary | ICD-10-CM

## 2019-11-20 DIAGNOSIS — R63.5 WEIGHT GAIN FOLLOWING GASTRIC BYPASS SURGERY: ICD-10-CM

## 2019-11-20 DIAGNOSIS — E66.09 CLASS 1 OBESITY DUE TO EXCESS CALORIES WITH SERIOUS COMORBIDITY AND BODY MASS INDEX (BMI) OF 31.0 TO 31.9 IN ADULT: ICD-10-CM

## 2019-11-20 DIAGNOSIS — E66.811 CLASS 1 OBESITY DUE TO EXCESS CALORIES WITH SERIOUS COMORBIDITY AND BODY MASS INDEX (BMI) OF 31.0 TO 31.9 IN ADULT: ICD-10-CM

## 2019-11-20 DIAGNOSIS — E66.3 OVERWEIGHT (BMI 25.0-29.9): ICD-10-CM

## 2019-11-20 DIAGNOSIS — Z98.84 BARIATRIC SURGERY STATUS: ICD-10-CM

## 2019-11-20 LAB
FERRITIN SERPL-MCNC: 50 NG/ML (ref 26–388)
IRON SATN MFR SERPL: 36 % (ref 15–46)
IRON SERPL-MCNC: 117 UG/DL (ref 35–180)
PTH-INTACT SERPL-MCNC: 33 PG/ML (ref 12–64)
TIBC SERPL-MCNC: 321 UG/DL (ref 240–430)
VIT B12 SERPL-MCNC: 926 PG/ML (ref 193–986)

## 2019-11-20 PROCEDURE — 82306 VITAMIN D 25 HYDROXY: CPT | Performed by: PHYSICIAN ASSISTANT

## 2019-11-20 PROCEDURE — 99213 OFFICE O/P EST LOW 20 MIN: CPT | Performed by: PHYSICIAN ASSISTANT

## 2019-11-20 PROCEDURE — 83550 IRON BINDING TEST: CPT | Performed by: PHYSICIAN ASSISTANT

## 2019-11-20 PROCEDURE — 82607 VITAMIN B-12: CPT | Performed by: PHYSICIAN ASSISTANT

## 2019-11-20 PROCEDURE — 83970 ASSAY OF PARATHORMONE: CPT | Performed by: PHYSICIAN ASSISTANT

## 2019-11-20 PROCEDURE — 82728 ASSAY OF FERRITIN: CPT | Performed by: PHYSICIAN ASSISTANT

## 2019-11-20 PROCEDURE — 83540 ASSAY OF IRON: CPT | Performed by: PHYSICIAN ASSISTANT

## 2019-11-20 PROCEDURE — 97803 MED NUTRITION INDIV SUBSEQ: CPT | Performed by: DIETITIAN, REGISTERED

## 2019-11-20 PROCEDURE — 36415 COLL VENOUS BLD VENIPUNCTURE: CPT | Performed by: PHYSICIAN ASSISTANT

## 2019-11-20 ASSESSMENT — MIFFLIN-ST. JEOR
SCORE: 1987.44
SCORE: 1987.44

## 2019-11-20 NOTE — PATIENT INSTRUCTIONS
6 years status laparoscopic gastric bypass  Obesity - Body mass index is 31.21 kg/m ..  Weight regain following gastric bypass  Post surgical malabsorption:   Ordered vitamin B12, vitamin D, PTH, ferritin, TIBC, and iron labs.  Reviewed recent CBC   Follow food plan per dietitian recommendations.   Continue taking recommended post-op vitamins.  Discussed increasing vitamin D from 2000 to 4000 IUs until April 2020.  Then go back to 2000 international unit(s) throughout the summer  Add an additional 60 minute cardio activity weekly  Discussed decreasing caffeine due to irritation along staple line  Return to clinic in 1 year.

## 2019-11-20 NOTE — PROGRESS NOTES
"NUTRITION POST OP APPOINTMENT  DATE OF VISIT: November 20, 2019    Ethan Weiss  1960  male  [id number]  58 year old     ASSESSMENT:    REASON FOR VISIT:  Ethan is a 58 year old year old male presents today for 6 year PO nutrition follow-up appointment. Patient is accompanied by self. Medical Assistant in training present for appointment.     DIAGNOSIS:  Status post gastric bypass surgery.  Obesity Obesity Grade I BMI 30-34.9     ANTHROPOMETRICS:  Initial Weight: 318 lbs     Height: 188 cm (6' 2\")  Current Weight: 110.3 kg (243 lb 1.6 oz)   BMI: 31.21 kg/(m^2).    VITAMINS AND MINERALS:  2 Multivitamin with Minerals (AM)  400 mg Calcium With Vitamin D (lunch, dinner, bedtime)  2000 International units Vitamin D  500 mcg Vitamin B-12 sublingual 5x/week   No iron needed per clinic guidelines  Not taking Vitamin B12 of Thiamine    NUTRITION HISTORY:  Breakfast: [8am] summer: yogurt + small amount of granola  winter: oatmeal (made with [employer] milk)  Lunch: [noon] apple + peanut butter sandwich   Supper: [6pm] chili  chicken + rice + vegetables   Snacks: [afternoon] chips  [evenings] chips   Fluids consumed: Water/enhanced water (48oz), tea (decaf), V8 (8oz), EtOH (1/2U 1x/month)   Consuming liquid calories: Yes  Protein intake: 60-70 grams/day  Tolerate regular texture food: Yes  Any foods not tolerated details: Yes  If any food not tolerated: beef  Portion size: 1 cup or more  Take 20-30 minutes to consume each meal: No   Eat protein foods first: Yes  Fluids and meals separate by at least 30 minutes: Usually   Chew foods thoroughly: Yes  Tolerating diet: Yes  Drinking high protein supplements: No  Consuming snacks per day: 2  Additional Information: Pt continues with gradual weight gain, discussed the importance of following guidelines for long term success. Pt motivated to get back on track with nutrition changes. Not quite as motivated to increase exercise but willing to try.       PHYSICAL " ACTIVITY:  Type: walking dog  Frequency (days per week): 2  Duration (min): 90    DIAGNOSIS:  Previous Nutrition Diagnosis: Altered gastrointestinal function related to alteration in gastrointestinal structure as evidenced by history of gastric bypass surgery.- no change    Previous goals:  Aim for 1 cup portions - not met  Replace solid snacks with milk - not met  Aim for 600-800 kcals for weight loss; 800-1000 kcals for weight maintenance. Ok to have occasional days up to 1200 kcals as long as weight stays stable. - not met  Continue to practice all post-op guidelines - not met    Current Nutrition Diagnosis: Altered gastrointestinal function related to alteration in gastrointestinal structure as evidenced by history of gastric bypass surgery.    INTERVENTION:   Nutrition Prescription: Eat 3 meals a day at regular intervals. Consume 60-90 grams of protein daily. Follow post-surgical vitamin and mineral protocol.  Assessed learning needs and learning preferences.    GOALS:  Increase Vitamin D to 4000 international units  Begin taking Thiamine or Vitamin B Complex  Aim for 1 cup portions  Have 800-1000 kcals per day  Gradually increase exercise; ideally would recommend 5x/week for at least 30 minutes    Follow-Up:   Recommend annual follow up visits to assist with lifestyle changes or per insurance.  Implementation: Discussed progress toward previous goals; reinforced importance of following bariatric lifestyle changes.    NUTRITION MONITORING AND EVALUATION:  Anticipated compliance: fair-good  Verbalized good understanding.    Follow up: Patient to follow up in 12 months.    TIME SPENT WITH PATIENT:  25 minutes    Sammie Carpio RD, LD  Clinical Dietitian   HPI      YONG      Physical Exam

## 2019-11-20 NOTE — PROGRESS NOTES
"BARIATRIC FOLLOW UP VISIT     November 20, 2019       HISTORY OF PRESENT ILLNESS: Pt returns today for his follow-up appointment status post laparoscopic gastric bypass.  Happy with his   He had increased his activity by running several days per week.    Initial Weight: 318 lb (144.2 kg)   Current Weight: 243 lb 1.6 oz (110.3 kg)  Cumulative weight loss (lbs): 74.9  Last Visits Weight: 229 lb 8 oz (104.1 kg)     Patient is taking the following bariatric postoperative vitamins:  2 Complete multivitamins with minerals (at different times than calcium)   2000 International Units of Vitamin D daily  1200 mg of Calcium daily in divided doses  500 mcg of Vitamin B12 sl 5 days weekly      Pt is exercising by running several times weekly.  Walking about a mile everyday.  Will do an additional 2 days per week walking 1.5 miles       OBESITY RELATED CONDITIONS:  High cholesterol - resolved  High Blood pressure - resolved  GERD - resolved  Diabetes Mellitus II - resolved     SOCIAL HISTORY:  Pt denies smoking.  Pt drinks half a beer once monthly.  Avoids NSAIDS.  Once a day 4 times weekly      REVIEW OF SYSTEMS:     GI:  Nausea- No  Vomiting- No  Diarrhea- No  Constipation- No  Drinks 48 oz water  Dysphagia- No  Abdominal Pain- No  Heartburn- No     SKIN:  Intertriginous irritation- No       PSYCH:  Depression- No  Anxiety- No      LABS/IMAGING/MEDICAL RECORDS REVIEW: last set of labs.  Most recent CBC    PHYSICAL EXAMINATION:   /65 (BP Location: Left arm, Patient Position: Sitting, Cuff Size: Adult Large)   Pulse 70   Ht 6' 2\" (1.88 m)   Wt 243 lb 1.6 oz (110.3 kg)   SpO2 97%   BMI 31.21 kg/m      GENERAL: Alert and oriented x3. NAD  HEART: No murmurs, rubs or gallops, Regular rate and rhythm  LUNGS: Breathing unlabored, Lung sounds clear to auscultation bilaterally  ABDOMEN: soft; nontender; nondistended, incision well healed. No hernia  EXTREMITIES: No LE edema bilaterally, Gait normal  SKIN: No intertriginous " irritation or rash      ASSESSMENT AND PLAN:      6 years status laparoscopic gastric bypass  Morbid Obesity - Resolved  Obesity - Body mass index is 31.21 kg/m ..  Weight regain following gastric bypass  Post surgical malabsorption:   Ordered vitamin B12, vitamin D, PTH, ferritin, TIBC, and iron labs.  Reviewed recent CBC   Follow food plan per dietitian recommendations.   Continue taking recommended post-op vitamins.  Discussed increasing vitamin D from 2000 to 4000 IUs until April 2020.  Then go back to 2000 international unit(s) throughout the summer  Add an additional 60 minute cardio activity weekly  Discussed decreasing caffeine due to irritation along staple line  Return to clinic in 1 year.       I spent a total of 18 minutes face to face with Ethan during today's office visit. Over 50% of this time was spent counseling the patient and/or coordinating care.

## 2019-11-22 LAB — DEPRECATED CALCIDIOL+CALCIFEROL SERPL-MC: 68 UG/L (ref 20–75)

## 2020-04-06 ENCOUNTER — VIRTUAL VISIT (OUTPATIENT)
Dept: FAMILY MEDICINE | Facility: CLINIC | Age: 60
End: 2020-04-06

## 2020-04-06 DIAGNOSIS — Z98.84 HISTORY OF ROUX-EN-Y GASTRIC BYPASS: ICD-10-CM

## 2020-04-06 DIAGNOSIS — R05.9 COUGH: ICD-10-CM

## 2020-04-06 DIAGNOSIS — K21.00 GASTROESOPHAGEAL REFLUX DISEASE WITH ESOPHAGITIS: Primary | ICD-10-CM

## 2020-04-06 PROCEDURE — 99213 OFFICE O/P EST LOW 20 MIN: CPT | Mod: GT | Performed by: FAMILY MEDICINE

## 2020-04-06 NOTE — PROGRESS NOTES
"  Problem(s) Oriented visit      Ethan Weiss is being evaluated via a billable video visit.      The patient has been notified of following:     \"This video visit will be conducted via a call between you and your physician/provider. We have found that certain health care needs can be provided without the need for an in-person physical exam.  This service lets us provide the care you need with a video conversation.  If a prescription is necessary we can send it directly to your pharmacy.  If lab work is needed we can place an order for that and you can then stop by our lab to have the test done at a later time.    If during the course of the call the physician/provider feels a video visit is not appropriate, you will not be charged for this service.\"     Physician has received verbal consent for a Video Visit from the patient? Yes    SUBJECTIVE:                                                    Subjective     Ethan Weiss is a 59 year old male who presents to clinic today for the following health issues:    HPI     1. Cough  3.5 weeks and feeling otherwise ok  Had influenza then and cough is only lingeriring  On no meds and other than this dry cough feels well    2. History of Renea-en-Y gastric bypass  Complicates the picture      3. Gastroesophageal reflux disease with esophagitis  Had in 2011 and ended up with vocal cord nodule needing surgery         Histories:   Patient Active Problem List   Diagnosis     Anxieties     Health Care Home     History of diabetes mellitus     Nevus     Postsurgical malabsorption     Gastric bypass status for obesity     Family history of prostate cancer     Primary osteoarthritis involving multiple joints     Overweight (BMI 25.0-29.9)     Advanced directives, counseling/discussion     Past Surgical History:   Procedure Laterality Date     APPENDECTOMY  1970     LAPAROSCOPIC BYPASS GASTRIC  11/18/2013    Procedure: LAPAROSCOPIC BYPASS GASTRIC;  LAPARSCOPIC RENEA-EN-Y " "GASTRIC BYPASS;  Surgeon: August Flores MD;  Location: [facility] OR     SALIVARY GLAND SURGERY       TONSILLECTOMY & ADENOIDECTOMY       VARICOCELECTOMY         Social History     Tobacco Use     Smoking status: Never Smoker     Smokeless tobacco: Never Used   Substance Use Topics     Alcohol use: Yes     Alcohol/week: 0.0 - 1.0 standard drinks     Comment: 1 drink per week     Family History   Problem Relation Age of Onset     Diabetes Mother      Prostate Cancer Father      Cardiovascular Father         s/p valve replacement     C.A.D. Father         CAB     Coronary Artery Disease Father      Unknown/Adopted Son            Reviewed and updated as needed this visit by Provider         ROS:  General and CV completed and negative except as noted above          OBJECTIVE:                                                      Objective    There were no vitals taken for this visit.  Estimated body mass index is 31.21 kg/m  as calculated from the following:    Height as of 11/20/19: 1.88 m (6' 2\").    Weight as of 11/20/19: 110.3 kg (243 lb 1.6 oz).    Physical Exam   APPEARANCE: = Relaxed and in no distress  Resp effort = Calm regular breathing  Recent/Remote Memory = Alert and Oriented x 3  Mood/Affect = Cooperative and interested            ASSESSMENT/PLAN:                                                        There are no Patient Instructions on file for this visit.  Ethan was seen today for cough.    Diagnoses and all orders for this visit:    Gastroesophageal reflux disease with esophagitis  -     omeprazole (PRILOSEC) 20 MG DR capsule; Take 1 capsule (20 mg) by mouth daily    Cough  -     omeprazole (PRILOSEC) 20 MG DR capsule; Take 1 capsule (20 mg) by mouth daily    History of Renea-en-Y gastric bypass      >15 min spent with patient, greater than 50% spent on discussion/education/planning, etc about The primary encounter diagnosis was Gastroesophageal reflux disease with esophagitis. Diagnoses of Cough and " History of Renea-en-Y gastric bypass were also pertinent to this visit.         Video-Visit Details    Type of service:  Video Visit  Originating Location (pt. Location): Home  Distant Location (provider location):  [facility]   Mode of Communication:  Video Conference via [employer]    The following health maintenance items are reviewed in Epic and correct as of today:  Health Maintenance   Topic Date Due     HIV SCREENING  11/22/1975     PHQ-2  01/01/2020     COLORECTAL CANCER SCREENING  02/26/2020     PREVENTIVE CARE VISIT  07/29/2020     ADVANCE CARE PLANNING  07/23/2023     LIPID  07/29/2024     DTAP/TDAP/TD IMMUNIZATION (3 - Td) 07/23/2028     HEPATITIS C SCREENING  Completed     INFLUENZA VACCINE  Completed     ZOSTER IMMUNIZATION  Completed     IPV IMMUNIZATION  Aged Out     MENINGITIS IMMUNIZATION  Aged Out       Cuauhtemoc Arredondo MD  [facility]  Family Practice  [facility]  [phone]    For any issues my office # is [phone]

## 2020-04-19 NOTE — TELEPHONE ENCOUNTER
9/26/12 XIOMARA on file for Juan, phone was changed to speaker with both Juan and Julia on the call.  Juan states she continues to have a consistent fever daily x a month or more.   Still having pulling pains in the back. Denies frequency or burning with urination.   SOB the last few days. Worse when laying down.   Had kidney pains, was seen in the clinic.   Pulse: 113    Pt advised she needs to be evaluated in the ED due to sx.   Juan will take Julia to the ED.     Winnie Babin, RN   [facility] RN Triage       COVID 19 Nurse Triage Plan/Patient Instructions    Please be aware that novel coronavirus (COVID-19) may be circulating in the community. If you develop symptoms such as fever, cough, or SOB or if you have concerns about the presence of another infection including coronavirus (COVID-19), please contact your health care provider or visit [email or web address].     Disposition/Instructions    Patient to go to ED and follow protocol based instructions. Follow System Ambulatory Workflow for COVID 19.     Bring Your Own Device:  Please also bring your smart device(s) (smart phones, tablets, laptops) and their charging cables for your personal use and to communicate with your care team during your visit.    Thank you for limiting contact with others, wearing a simple mask to cover your cough, practice good hand hygiene habits and accessing our virtual services where possible to limit the spread of this virus.    For more information about COVID19 and options for caring for yourself at home, please visit the CDC website at https://www.cdc.gov/coronavirus/2019-ncov/about/steps-when-sick.html  For more options for care at [facility], please visit our website at [email or web address]    For more information, please use the [place] Department of Health ([facility]) COVID-19 Hotlines (Interpreters available):     Health questions: Phone Number: [phone] or [phone] and Hours: 7  Patient read Dr. Arredondo's result note regarding PSA slightly elevated from last year and recommending repeat PSA in 4 months on mychart 7/26/18. Future order placed for PSA.  Component      Latest Ref Rng & Units 7/3/2017 7/23/2018   PSA NG/ML      0.0 - 4.0 ng/mL 2.1 3.4     Fela Murphy RN   a.m. to 7 p.m.    Schools and  questions: Phone Number: [phone] or [phone] and Hours 7 a.m. to 7 p.m.                    Reason for Disposition    [1] MODERATE difficulty breathing (e.g., speaks in phrases, SOB even at rest, pulse 100-120) AND [2] NEW-onset or WORSE than normal    Additional Information    Negative: Stridor    Negative: Wheezing started suddenly after medicine, an allergic food or bee sting    Negative: Passed out (i.e., lost consciousness, collapsed and was not responding)    Negative: Difficult to awaken or acting confused (e.g., disoriented, slurred speech)    Negative: Bluish (or gray) lips or face now    Negative: Severe difficulty breathing (e.g., struggling for each breath, speaks in single words)    Negative: Choking on something    Negative: [1] Breathing stopped AND [2] hasn't returned    Negative: Slow, shallow and weak breathing    Negative: Sounds like a life-threatening emergency to the triager    Negative: Chest pain    Negative: [1] Wheezing (high pitched whistling sound) AND [2] previous asthma attacks or use of asthma medicines    Negative: [1] Difficulty breathing AND [2] only present when coughing    Negative: [1] Difficulty breathing AND [2] only from stuffy or runny nose    Protocols used: BREATHING DIFFICULTY-A-AH

## 2020-08-28 ENCOUNTER — OFFICE VISIT (OUTPATIENT)
Dept: FAMILY MEDICINE | Facility: CLINIC | Age: 60
End: 2020-08-28

## 2020-08-28 VITALS
DIASTOLIC BLOOD PRESSURE: 76 MMHG | SYSTOLIC BLOOD PRESSURE: 116 MMHG | HEART RATE: 70 BPM | RESPIRATION RATE: 16 BRPM | OXYGEN SATURATION: 97 % | BODY MASS INDEX: 31.2 KG/M2 | HEIGHT: 74 IN | WEIGHT: 243.13 LBS | TEMPERATURE: 98.5 F

## 2020-08-28 DIAGNOSIS — R97.20 RISING PSA LEVEL: ICD-10-CM

## 2020-08-28 DIAGNOSIS — M15.0 PRIMARY OSTEOARTHRITIS INVOLVING MULTIPLE JOINTS: ICD-10-CM

## 2020-08-28 DIAGNOSIS — K91.2 POSTSURGICAL MALABSORPTION: ICD-10-CM

## 2020-08-28 DIAGNOSIS — R73.03 PREDIABETES: ICD-10-CM

## 2020-08-28 DIAGNOSIS — Z12.11 SPECIAL SCREENING FOR MALIGNANT NEOPLASMS, COLON: ICD-10-CM

## 2020-08-28 DIAGNOSIS — Z00.00 ROUTINE GENERAL MEDICAL EXAMINATION AT A HEALTH CARE FACILITY: Primary | ICD-10-CM

## 2020-08-28 DIAGNOSIS — Z98.84 GASTRIC BYPASS STATUS FOR OBESITY: ICD-10-CM

## 2020-08-28 LAB
% GRANULOCYTES: 69.5 % (ref 42.2–75.2)
HCT VFR BLD AUTO: 45 % (ref 39–51)
HEMOGLOBIN: 15.5 G/DL (ref 13.4–17.5)
LYMPHOCYTES NFR BLD AUTO: 23.2 % (ref 20.5–51.1)
MCH RBC QN AUTO: 29.7 PG (ref 27–31)
MCHC RBC AUTO-ENTMCNC: 34.5 G/DL (ref 33–37)
MCV RBC AUTO: 86 FL (ref 80–100)
MONOCYTES NFR BLD AUTO: 7.3 % (ref 1.7–9.3)
PLATELET # BLD AUTO: 176 K/UL (ref 140–450)
RBC # BLD AUTO: 5.23 X10/CMM (ref 4.2–5.9)
WBC # BLD AUTO: 4.8 X10/CMM (ref 3.8–11)

## 2020-08-28 PROCEDURE — 36415 COLL VENOUS BLD VENIPUNCTURE: CPT | Performed by: FAMILY MEDICINE

## 2020-08-28 PROCEDURE — 85025 COMPLETE CBC W/AUTO DIFF WBC: CPT | Performed by: FAMILY MEDICINE

## 2020-08-28 PROCEDURE — 99396 PREV VISIT EST AGE 40-64: CPT | Performed by: FAMILY MEDICINE

## 2020-08-28 ASSESSMENT — MIFFLIN-ST. JEOR: SCORE: 1982.56

## 2020-08-28 NOTE — PROGRESS NOTES
3  SUBJECTIVE:   CC: Ethan Weiss is an 59 year old male who presents for preventive health visit.     Healthy Habits:    Do you get at least three servings of calcium containing foods daily (dairy, green leafy vegetables, etc.)? yes    Amount of exercise or daily activities, outside of work: 6 day(s) per week    Problems taking medications regularly No    Medication side effects: No    Have you had an eye exam in the past two years? yes    Do you see a dentist twice per year? yes    Do you have sleep apnea, excessive snoring or daytime drowsiness?no      The patient has a history of impaired glucose tolerance with regularly elevated blood sugars.    They have  not been diagnosed with type II DM or placed on medications for diabetes before.   They deny polyuria, polydipsia.     The patient is obese with a BMI of Body mass index is 31.22 kg/m ..    Review of current labs show:    Lab Results   Component Value Date    A1C 5.3 07/23/2018    A1C 5.5 07/03/2017    A1C 5.2 04/20/2015    A1C 5.4 12/11/2013    A1C 5.8 11/19/2013    A1C 5.7 05/28/2013    A1C 6.2 02/06/2013    A1C 6.0 01/20/2012               Today's PHQ-2 Score:   PHQ-2 ( 1999 Pfizer) 8/28/2020 7/29/2019   Q1: Little interest or pleasure in doing things 0 0   Q2: Feeling down, depressed or hopeless 0 0   PHQ-2 Score 0 0       Abuse: Current or Past(Physical, Sexual or Emotional)- No  Do you feel safe in your environment? Yes      HEARING FREQUENCY    Right Ear:   Pass     Left Ear:        4000 Hz: RESPONSE- on Level: tone not heard    Hearing Acuity: Pass    Hearing Assessment: normal        Social History     Tobacco Use     Smoking status: Never Smoker     Smokeless tobacco: Never Used   Substance Use Topics     Alcohol use: Yes     Alcohol/week: 0.0 - 1.0 standard drinks     Comment: 1 drink per week     If you drink alcohol do you typically have >3 drinks per day or >7 drinks per week? No                      Last PSA: No results found for:  PSA    Reviewed orders with patient. Reviewed health maintenance and updated orders accordingly - Yes  Patient Active Problem List   Diagnosis     Anxieties     Health Care Home     History of diabetes mellitus     Nevus     Postsurgical malabsorption     Gastric bypass status for obesity     Family history of prostate cancer     Primary osteoarthritis involving multiple joints     Overweight (BMI 25.0-29.9)     Advanced directives, counseling/discussion     IGT (impair glucose tolerance)     Past Surgical History:   Procedure Laterality Date     APPENDECTOMY  1970     LAPAROSCOPIC BYPASS GASTRIC  11/18/2013    Procedure: LAPAROSCOPIC BYPASS GASTRIC;  LAPARSCOPIC MARITA-EN-Y GASTRIC BYPASS;  Surgeon: August Flores MD;  Location: [facility] OR     SALIVARY GLAND SURGERY       TONSILLECTOMY & ADENOIDECTOMY       VARICOCELECTOMY         Social History     Tobacco Use     Smoking status: Never Smoker     Smokeless tobacco: Never Used   Substance Use Topics     Alcohol use: Yes     Alcohol/week: 0.0 - 1.0 standard drinks     Comment: 1 drink per week     Family History   Problem Relation Age of Onset     Diabetes Mother      Prostate Cancer Father      Cardiovascular Father         s/p valve replacement     C.A.D. Father         CAB     Coronary Artery Disease Father      Unknown/Adopted Son            Reviewed and updated as needed this visit by clinical staff  Tobacco  Allergies  Meds  Problems         Reviewed and updated as needed this visit by Provider  Problems        Past Medical History:   Diagnosis Date     Atopic eczema      Cluster headache     rare in 2016     Family history of prostate cancer     dad     Gastric bypass status for obesity      Health Care Home      History of anxiety     2016 resolved     Hyperlipidaemia LDL goal < 100      IGT (impair glucose tolerance)     history of     Obesity     s/p gastric bypass       Past Surgical History:   Procedure Laterality Date     APPENDECTOMY  1970      "LAPAROSCOPIC BYPASS GASTRIC  11/18/2013    Procedure: LAPAROSCOPIC BYPASS GASTRIC;  LAPARSCOPIC MARITA-EN-Y GASTRIC BYPASS;  Surgeon: August Flores MD;  Location: [facility] OR     SALIVARY GLAND SURGERY       TONSILLECTOMY & ADENOIDECTOMY       VARICOCELECTOMY         ROS:  CONSTITUTIONAL: NEGATIVE for fever, chills, change in weight  INTEGUMENTARY/SKIN: NEGATIVE for worrisome rashes, moles or lesions  EYES: NEGATIVE for vision changes or irritation  ENT: NEGATIVE for ear, mouth and throat problems  RESP: NEGATIVE for significant cough or SOB  CV: NEGATIVE for chest pain, palpitations or peripheral edema  GI: NEGATIVE for nausea, abdominal pain, heartburn, or change in bowel habits   male: negative for dysuria, hematuria, decreased urinary stream, erectile dysfunction, urethral discharge  MUSCULOSKELETAL: NEGATIVE for significant arthralgias or myalgia  NEURO: NEGATIVE for weakness, dizziness or paresthesias  PSYCHIATRIC: NEGATIVE for changes in mood or affect    OBJECTIVE:   /76   Pulse 70   Temp 98.5  F (36.9  C)   Resp 16   Ht 1.88 m (6' 2\")   Wt 110.3 kg (243 lb 2 oz)   SpO2 97%   BMI 31.22 kg/m    EXAM:  GENERAL: healthy, alert and no distress  EYES: Eyes grossly normal to inspection, PERRL and conjunctivae and sclerae normal  HENT: ear canals and TM's normal, nose and mouth without ulcers or lesions  NECK: no adenopathy, no asymmetry, masses, or scars and thyroid normal to palpation  RESP: lungs clear to auscultation - no rales, rhonchi or wheezes  CV: regular rate and rhythm, normal S1 S2, no S3 or S4, no murmur, click or rub, no peripheral edema and peripheral pulses strong  ABDOMEN: soft, nontender, no hepatosplenomegaly, no masses and bowel sounds normal   (male): normal male genitalia without lesions or urethral discharge, no hernia  RECTAL: normal sphincter tone, no rectal masses, prostate normal size, smooth, nontender without nodules or masses  MS: no gross musculoskeletal defects noted, " "no edema  SKIN: no suspicious lesions or rashes  NEURO: Normal strength and tone, mentation intact and speech normal  PSYCH: mentation appears normal, affect normal/bright    Diagnostic Test Results:  Labs reviewed in Epic    ASSESSMENT/PLAN:   Ethan was seen today for physical.    Diagnoses and all orders for this visit:    Routine general medical examination at a health care facility    Gastric bypass status for obesity  Check labs    Postsurgical malabsorption    Primary osteoarthritis involving multiple joints  Doing well, weight loss has helped.      COUNSELING:  Reviewed preventive health counseling, as reflected in patient instructions       Regular exercise       Healthy diet/nutrition    Estimated body mass index is 31.22 kg/m  as calculated from the following:    Height as of this encounter: 1.88 m (6' 2\").    Weight as of this encounter: 110.3 kg (243 lb 2 oz).    Weight management plan: Discussed healthy diet and exercise guidelines    He reports that he has never smoked. He has never used smokeless tobacco.      Counseling Resources:  ATP IV Guidelines  Pooled Cohorts Equation Calculator  FRAX Risk Assessment  ICSI Preventive Guidelines  Dietary Guidelines for Americans, 2010  USDA's MyPlate  ASA Prophylaxis  Lung CA Screening    Cuauhtemoc Arredondo MD  [facility]  "

## 2020-08-29 LAB
ALBUMIN SERPL-MCNC: 4.5 G/DL (ref 3.8–4.9)
ALBUMIN/GLOB SERPL: 2.1 {RATIO} (ref 1.2–2.2)
ALP SERPL-CCNC: 64 IU/L (ref 39–117)
ALT SERPL-CCNC: 44 IU/L (ref 0–44)
AST SERPL-CCNC: 21 IU/L (ref 0–40)
BILIRUB SERPL-MCNC: 0.7 MG/DL (ref 0–1.2)
BUN SERPL-MCNC: 16 MG/DL (ref 8–27)
BUN/CREATININE RATIO: 17 (ref 10–24)
CALCIUM SERPL-MCNC: 9 MG/DL (ref 8.6–10.2)
CHLORIDE SERPLBLD-SCNC: 101 MMOL/L (ref 96–106)
CHOLEST SERPL-MCNC: 162 MG/DL (ref 100–199)
CREAT SERPL-MCNC: 0.95 MG/DL (ref 0.76–1.27)
EGFR IF AFRICN AM: 100 ML/MIN/1.73
EGFR IF NONAFRICN AM: 87 ML/MIN/1.73
GLOBULIN, TOTAL: 2.1 G/DL (ref 1.5–4.5)
GLUCOSE SERPL-MCNC: 92 MG/DL (ref 65–99)
HBA1C MFR BLD: 5.5 % (ref 4.8–5.6)
HDLC SERPL-MCNC: 42 MG/DL
LDL/HDL RATIO: 2.4 RATIO (ref 0–3.6)
LDLC SERPL CALC-MCNC: 100 MG/DL (ref 0–99)
POTASSIUM SERPL-SCNC: 4.2 MMOL/L (ref 3.5–5.2)
PROT SERPL-MCNC: 6.6 G/DL (ref 6–8.5)
PSA NG/ML: 3.3 NG/ML (ref 0–4)
SODIUM SERPL-SCNC: 140 MMOL/L (ref 134–144)
TOTAL CO2: 27 MMOL/L (ref 20–29)
TRIGL SERPL-MCNC: 101 MG/DL (ref 0–149)
TSH BLD-ACNC: 2.11 UIU/ML (ref 0.45–4.5)
VLDLC SERPL CALC-MCNC: 20 MG/DL (ref 5–40)

## 2020-10-27 ENCOUNTER — MYC MEDICAL ADVICE (OUTPATIENT)
Dept: FAMILY MEDICINE | Facility: CLINIC | Age: 60
End: 2020-10-27

## 2020-10-30 ENCOUNTER — MEDICAL CORRESPONDENCE (OUTPATIENT)
Dept: FAMILY MEDICINE | Facility: CLINIC | Age: 60
End: 2020-10-30

## 2020-10-30 NOTE — TELEPHONE ENCOUNTER
Patient asking if he would be candidate for Cologuard for colon cancer screening instead of colonoscopy.   Age 59.   No family hx colon cancer.   Had first colonoscopy at age 49 - normal no polyps and to repeat in 10 years.     Plan: per Dr. Arnulfo gamez for Cologuard. Patient informed and order faxed to [facility]. Patient will await contact from them.   Fela Murphy RN

## 2020-11-14 ENCOUNTER — TRANSFERRED RECORDS (OUTPATIENT)
Dept: FAMILY MEDICINE | Facility: CLINIC | Age: 60
End: 2020-11-14

## 2020-11-14 LAB — COLOGUARD-ABSTRACT: NEGATIVE

## 2020-11-18 ENCOUNTER — VIRTUAL VISIT (OUTPATIENT)
Dept: SURGERY | Facility: CLINIC | Age: 60
End: 2020-11-18
Payer: COMMERCIAL

## 2020-11-18 ENCOUNTER — OFFICE VISIT (OUTPATIENT)
Dept: SURGERY | Facility: CLINIC | Age: 60
End: 2020-11-18
Payer: COMMERCIAL

## 2020-11-18 ENCOUNTER — HOSPITAL ENCOUNTER (OUTPATIENT)
Dept: LAB | Facility: CLINIC | Age: 60
Discharge: HOME | End: 2020-11-18
Attending: PHYSICIAN ASSISTANT | Admitting: PHYSICIAN ASSISTANT
Payer: COMMERCIAL

## 2020-11-18 VITALS
DIASTOLIC BLOOD PRESSURE: 78 MMHG | HEIGHT: 74 IN | WEIGHT: 245.2 LBS | HEART RATE: 57 BPM | SYSTOLIC BLOOD PRESSURE: 138 MMHG | OXYGEN SATURATION: 98 % | BODY MASS INDEX: 31.47 KG/M2

## 2020-11-18 VITALS — HEIGHT: 74 IN | WEIGHT: 245.2 LBS | BODY MASS INDEX: 31.47 KG/M2

## 2020-11-18 DIAGNOSIS — K91.2 POSTSURGICAL MALABSORPTION: ICD-10-CM

## 2020-11-18 DIAGNOSIS — E66.3 OVERWEIGHT (BMI 25.0-29.9): ICD-10-CM

## 2020-11-18 DIAGNOSIS — R63.5 WEIGHT GAIN FOLLOWING GASTRIC BYPASS SURGERY: ICD-10-CM

## 2020-11-18 DIAGNOSIS — Z98.84 GASTRIC BYPASS STATUS FOR OBESITY: ICD-10-CM

## 2020-11-18 DIAGNOSIS — Z98.84 BARIATRIC SURGERY STATUS: ICD-10-CM

## 2020-11-18 DIAGNOSIS — E66.811 CLASS 1 OBESITY DUE TO EXCESS CALORIES WITH SERIOUS COMORBIDITY AND BODY MASS INDEX (BMI) OF 31.0 TO 31.9 IN ADULT: Primary | ICD-10-CM

## 2020-11-18 DIAGNOSIS — M15.0 PRIMARY OSTEOARTHRITIS INVOLVING MULTIPLE JOINTS: ICD-10-CM

## 2020-11-18 DIAGNOSIS — Z98.84 WEIGHT GAIN FOLLOWING GASTRIC BYPASS SURGERY: ICD-10-CM

## 2020-11-18 DIAGNOSIS — E66.09 CLASS 1 OBESITY DUE TO EXCESS CALORIES WITH SERIOUS COMORBIDITY AND BODY MASS INDEX (BMI) OF 31.0 TO 31.9 IN ADULT: Primary | ICD-10-CM

## 2020-11-18 LAB
FERRITIN SERPL-MCNC: 46 NG/ML (ref 26–388)
IRON SATN MFR SERPL: 35 % (ref 15–46)
IRON SERPL-MCNC: 114 UG/DL (ref 35–180)
PTH-INTACT SERPL-MCNC: 46 PG/ML (ref 12–64)
TIBC SERPL-MCNC: 330 UG/DL (ref 240–430)
VIT B12 SERPL-MCNC: 855 PG/ML (ref 193–986)

## 2020-11-18 PROCEDURE — 82607 VITAMIN B-12: CPT | Performed by: PHYSICIAN ASSISTANT

## 2020-11-18 PROCEDURE — 97803 MED NUTRITION INDIV SUBSEQ: CPT | Performed by: DIETITIAN, REGISTERED

## 2020-11-18 PROCEDURE — 83550 IRON BINDING TEST: CPT | Performed by: PHYSICIAN ASSISTANT

## 2020-11-18 PROCEDURE — 82728 ASSAY OF FERRITIN: CPT | Performed by: PHYSICIAN ASSISTANT

## 2020-11-18 PROCEDURE — 82306 VITAMIN D 25 HYDROXY: CPT | Performed by: PHYSICIAN ASSISTANT

## 2020-11-18 PROCEDURE — 99213 OFFICE O/P EST LOW 20 MIN: CPT | Mod: 95 | Performed by: PHYSICIAN ASSISTANT

## 2020-11-18 PROCEDURE — 83540 ASSAY OF IRON: CPT | Performed by: PHYSICIAN ASSISTANT

## 2020-11-18 PROCEDURE — 83970 ASSAY OF PARATHORMONE: CPT | Performed by: PHYSICIAN ASSISTANT

## 2020-11-18 RX ORDER — TOPIRAMATE 25 MG/1
TABLET, FILM COATED ORAL
Qty: 90 TABLET | Refills: 1 | Status: SHIPPED | OUTPATIENT
Start: 2020-11-18 | End: 2021-08-30

## 2020-11-18 ASSESSMENT — MIFFLIN-ST. JEOR
SCORE: 1991.97
SCORE: 1991.97

## 2020-11-18 NOTE — PATIENT INSTRUCTIONS
Kelsey Long good to see you today!  Please call [phone] to schedule for a follow up visit next month      MEDICATION STARTED AT THIS APPOINTMENT  We are starting topiramate at bedtime.  Start one tab, 25 mg, for a week. Go up to 50 mg (2 tabs) for the next week. At the third week, take   3 tabs (75 mg).  Stay at 3 tabs until you are seen again. Call the nurse at [phone] if you have any questions or concerns. (Do not stop taking it if you don't think it's working. For some people it works even though they do not feel much different.)    Topiramate (Topamax) is a medication that is used most often to treat migraine headaches or for seizures. It has also been found to help with weight loss. Although it's not currently FDA approved for weight loss, it has been used safely for a number of years to help people who are carrying extra weight.     Just how topiramate helps with weight loss has not been exactly determined. However it seems to work on areas of the brain to quiet down signals related to eating.      Topiramate may make you:    >feel less interest in eating in between meals   >think less about food and eating   >find it easier to push the plate away   >find giving up pop easier    >have an easier time eating less    For some of our patients, the pills work right away. They feel and think quite differently about food. Other patients don't feel much of a change but find in fact they have lost weight! Like all weight loss medications, topiramate works best when you help it work.  This means:    1) Have less tempting high calorie (fattening) food around the house or office    2) Have lower calorie food (fruits, vegetables,low fat meats and dairy) for snacks    3) Eat out only one time or less each week.   4) Eat your meals at a table with the TV or computer off.    Side-effects. Topiramate is generally well tolerated. The main side-effects we see are:   Tingling in hands,feet, or face (usually not very  troublesome)   Mental confusion and word finding trouble (about 10% of patients have this.)     Feeling sleepy or a bit dopey- this goes away very soon after starting.    One of the dangers of topiramate is the possibility of birth defects--if you get pregnant when you are on it, there is the risk that your baby will be born with a cleft lip or palate.  If you are on topiramate and of child bearing age, you need to be on a reliable form of birth control or refrain from sexual intercourse.     Please refer to the pharmacy insert for more information on side-effects. Since many pharmacists are not familiar with the use of topiramate in weight loss, calling the clinic will get you the most accurate information on the use of this medication for weight loss.     In order to get refills of this or any medication we prescribe you must be seen in the medical weight mgmt clinic every 2-3 months.

## 2020-11-18 NOTE — PROGRESS NOTES
"Ethan Weiss is a 59 year old male who is being evaluated via a billable video visit.      The patient has been notified of following:     \"This video visit will be conducted via a call between you and your physician/provider. We have found that certain health care needs can be provided without the need for an in-person physical exam.  This service lets us provide the care you need with a video conversation.  If a prescription is necessary we can send it directly to your pharmacy.  If lab work is needed we can place an order for that and you can then stop by our lab to have the test done at a later time.    Video visits are billed at different rates depending on your insurance coverage.  Please reach out to your insurance provider with any questions.    If during the course of the call the physician/provider feels a video visit is not appropriate, you will not be charged for this service.\"    Patient has given verbal consent for Video visit? Yes  How would you like to obtain your AVS? MyChart  If you are dropped from the video visit, the video invite should be resent to: Text to cell phone: [phone]  Will anyone else be joining your video visit? No          Video-Visit Details    Type of service:  Video Visit  Video Start Time: 9:09  Video End Time: 9:30    Originating Location (pt. Location): office    Distant Location (provider location):  [facility] [place]     Platform used for Video Visit: Sean Arteaga PA-C          VIRTUAL BARIATRIC FOLLOW UP          November 18, 2020      HISTORY OF PRESENT ILLNESS: Pt presents today for his follow-up appointment status post laparoscopic gastric bypass.  Overall he is doing well.  Does feel like he has slowly gained weight.  Does most of his own cooking.       245 lbs 3.2 oz    Wt Readings from Last 4 Encounters:   11/18/20 245 lb 3.2 oz (111.2 kg)   08/28/20 243 lb 2 oz (110.3 kg)   11/20/19 243 lb 1.6 oz (110.3 kg) "   11/20/19 243 lb 1.6 oz (110.3 kg)        BARIATRIC METRICS:  Initial Weight 318 lbs  Current Weight: 245 lb 3.2 oz (111.2 kg)  Body mass index is 31.48 kg/m .   Wt change since last visit (lbs): 2.1  Cumulative weight loss (lbs): 73 lbs      Patient is taking the following bariatric postoperative vitamins:  2 Complete multivitamins with minerals (at different times than calcium)   2000 International Units of Vitamin D daily  1200 mg of Calcium daily in divided doses  500 mcg of Vitamin B12 sl 5 days weekly  1 B Complex daily    Pt is exercising walks dog every day for about 45-90 minutes. Runs once weekly 2-3 miles       OBESITY RELATED CONDITIONS:  High cholesterol - resolved  High Blood pressure - resolved  GERD - resolved  Diabetes Mellitus II - resolved       SOCIAL HISTORY:  Pt denies smoking.  Pt drinks 1-2 alcoholic beverages monthly.  Avoids NSAIDS.  Couple glasses of either coffee or tea per week      REVIEW OF SYSTEMS:     GI:  Nausea- No  Vomiting-No  Diarrhea- No  Constipation- No      Drinks 60+ oz water  Dysphagia- No  Abdominal Pain- No  Heartburn- No     SKIN:  Intertriginous irritation- No     PSYCH:  Depression- No  Anxiety- No      ROS  General  Fatigue: No  Sleep Quality: good  Birth Control: NA  HEENT  Hx of glaucoma: No  Vision changes: No  Cardiovascular  Chest Pain with Exertion: No  Palpitations: No  Hx of heart disease: No  Pulmonary  Shortness of breath at rest: No  Shortness of breath with exertion: No  Snoring: No  Gastrointestinal  Heartburn: No  Abdominal pain: No  History of pancreatitis: No  Severe constipation: No  Gastrourinary  History of kidney stones: No  Psychiatric  Moods Stable: No  History of drug abuse: No  Endocrine  Polydipsia: No  Polyuria: No  Personal or family history of thyroid cancer:  Neurologic:  Hx of seizures: No  Hx of paresthesias:No          LABS/IMAGING/MEDICAL RECORDS REVIEW:   Vitamin D Deficiency screening   Date Value Ref Range Status   11/20/2019 68  "20 - 75 ug/L Final     Comment:     Season, race, dietary intake, and treatment affect the concentration of   25-hydroxy-Vitamin D. Values may decrease during winter months and increase   during summer months. Values 20-29 ug/L may indicate Vitamin D insufficiency   and values <20 ug/L may indicate Vitamin D deficiency.  Vitamin D determination is routinely performed by an immunoassay specific for   25 hydroxyvitamin D3.  If an individual is on vitamin D2 (ergocalciferol)   supplementation, please specify 25 OH vitamin D2 and D3 level determination by   LCMSMS test [id number].       Parathyroid Hormone Intact   Date Value Ref Range Status   11/20/2019 33 12 - 64 pg/mL Final     Vitamin B12   Date Value Ref Range Status   11/20/2019 926 193 - 986 pg/mL Final     Hemoglobin   Date Value Ref Range Status   08/28/2020 15.5 13.4 - 17.5 g/dl Final     Ferritin   Date Value Ref Range Status   11/20/2019 50 26 - 388 ng/mL Final     Iron   Date Value Ref Range Status   11/20/2019 117 35 - 180 ug/dL Final     Iron Binding Cap   Date Value Ref Range Status   11/20/2019 321 240 - 430 ug/dL Final     Iron Saturation Index   Date Value Ref Range Status   11/20/2019 36 15 - 46 % Final   11/06/2018 37 15 - 46 % Final   11/07/2017 33 15 - 46 % Final       PHYSICAL EXAMINATION:  /78   Pulse 57   Ht 6' 2\" (1.88 m)   Wt 245 lb 3.2 oz (111.2 kg)   SpO2 98%   BMI 31.48 kg/m      GENERAL: Pt sounds in NAD.   NEURO:  Mentation and speech appropriate for age. AxO x3.  PSYCH: affect normal judgement and insight intact      ASSESSMENT AND PLAN:      Class 1 obesity due to excess calories with serious comorbidity and body mass index (BMI) of 31.0 to 31.9 in adult /  Weight gain following gastric bypass surgery    Discussed increasing activity for the purpose of weight loss  -     topiramate (TOPAMAX) 25 MG tablet; 25mg at bedtime for week 1, 50mg at bedtime for 1 week, and 75mg at bedtime thereafter    We discussed the role of " "pharmacological agents in the treatment of obesity and the \"off-label\" use of medications in this practice. We discussed the risks and benefits of each. We discussed indications, contraindications, potential side effects, and estimated costs of each. Discussed that medications must always be used together with lifestyle changes such as improvements in diet choices, portion control and establishing and maintaining a regular exercise program.   Reviewed CMP from 8/2020. Discussed side effects of topamax.  [facility] patient information provided     All of patients questions about the weight loss program were answered fully.       Bariatric surgery status  -     7 years SP gastric bypass    Postsurgical malabsorption  -     Vitamin B12; Future  -     Vitamin D Screen; Future  -     Parathyroid Hormone Intact; Future  -     Iron; Future  -     Iron and Iron Binding Capacity; Future  -     Ferritin; Future    Primary osteoarthritis involving multiple joints   Discuss with primary      Follow up: Return to clinic in December with provider for a med check.           "

## 2020-11-19 LAB — DEPRECATED CALCIDIOL+CALCIFEROL SERPL-MC: 65 UG/L (ref 20–75)

## 2020-12-18 ENCOUNTER — VIRTUAL VISIT (OUTPATIENT)
Dept: SURGERY | Facility: CLINIC | Age: 60
End: 2020-12-18
Payer: COMMERCIAL

## 2020-12-18 VITALS — HEIGHT: 74 IN | BODY MASS INDEX: 30.29 KG/M2 | WEIGHT: 236 LBS

## 2020-12-18 DIAGNOSIS — Z98.84 BARIATRIC SURGERY STATUS: ICD-10-CM

## 2020-12-18 DIAGNOSIS — Z79.899 MEDICATION MANAGEMENT: ICD-10-CM

## 2020-12-18 DIAGNOSIS — E66.811 OBESITY, CLASS I, BMI 30-34.9: Primary | ICD-10-CM

## 2020-12-18 DIAGNOSIS — M15.0 PRIMARY OSTEOARTHRITIS INVOLVING MULTIPLE JOINTS: ICD-10-CM

## 2020-12-18 PROCEDURE — 99213 OFFICE O/P EST LOW 20 MIN: CPT | Mod: 95 | Performed by: PHYSICIAN ASSISTANT

## 2020-12-18 RX ORDER — TOPIRAMATE 25 MG/1
TABLET, FILM COATED ORAL
Qty: 270 TABLET | Refills: 0 | Status: SHIPPED | OUTPATIENT
Start: 2020-12-18 | End: 2021-03-03

## 2020-12-18 ASSESSMENT — MIFFLIN-ST. JEOR: SCORE: 1945.24

## 2020-12-18 NOTE — PROGRESS NOTES
"Ethan Weiss is a 60 year old male who is being evaluated via a billable video visit.      The patient has been notified of following:     \"This video visit will be conducted via a call between you and your physician/provider. We have found that certain health care needs can be provided without the need for an in-person physical exam.  This service lets us provide the care you need with a video conversation.  If a prescription is necessary we can send it directly to your pharmacy.  If lab work is needed we can place an order for that and you can then stop by our lab to have the test done at a later time.    Video visits are billed at different rates depending on your insurance coverage.  Please reach out to your insurance provider with any questions.    If during the course of the call the physician/provider feels a video visit is not appropriate, you will not be charged for this service.\"    Patient has given verbal consent for Video visit? Yes    If you are dropped from the video visit, the video invite should be resent to: Text to cell phone: [phone]  Will anyone else be joining your video visit? No        Video-Visit Details    Type of service:  Video Visit    Video Start Time: 9:05  Video End Time: 9:24    Originating Location (pt. Location): Home    Distant Location (provider location):  [facility] [place]     Platform used for Video Visit: Sean Arteaga PA-C        VIRTUAL BARIATRIC FOLLOW UP        2020              Return Virtual Medical Weight Management Note         Ethan Weiss  MRN:  [id number]  :  1960      Dear Cuauhtemoc Arredondo,    I had the pleasure of doing a virtual visit with your patient Ethan Weiss.  He is a 60 year old male who I am continuing to see for treatment of obesity.    INTERVAL HISTORY:  Patient started topamax last month after being seen for his annual with concerns of weight regain.  He is " taking 75 mg of topamax at bedtime. No paresthesias. No cloudy thinking or memory concerns.  No side effects noted at all.  As far as his appetite goes he does not feel as hungry.  Can not tell exactly what the medication is doing for his appetite. Gets over 60 oz water in daily      CURRENT WEIGHT (PATIENT REPORTED):  236 lbs 0 oz    Wt Readings from Last 4 Encounters:   12/18/20 236 lb (107 kg)   11/18/20 245 lb 3.2 oz (111.2 kg)   11/18/20 245 lb 3.2 oz (111.2 kg)   08/28/20 243 lb 2 oz (110.3 kg)       DIETARY HISTORY  Meals Per Day: 3  Food Diary:   B: Premier Protein shake - chocolate most days  L:  Canned chicken noodle soup with crackers and an apple  D: Using bariatric bible cookbook.  Made roy's pie.    Snacks Per Day: 2  Typical Snack: couple M&M's when lets dogs out  Fluid Intake: 60+ water with crystal light  Calorie Containing Beverages: No  Typical Protein Food Choices: meat  Meals at Restaurant per week: None lately    Positive Changes Since Last Visit: smaller portions    Getting Adequate Protein: Yes  Sleeping 7-8 hours/day: Yes  Stress management: good      Exercise: Walking dogs most days of the week      ROS:  Fatigue: No  Sleep Quality: good  Birth Control: NA  HEENT  Hx of glaucoma: No  Vision changes: No  Cardiovascular  Chest Pain with Exertion: No  Palpitations: No  Hx of heart disease: No  Pulmonary  Shortness of breath at rest: No  Shortness of breath with exertion: No  Snoring: No  Gastrointestinal  Heartburn: yes takes omeprazole daily and is well controlled  Abdominal pain: No  History of pancreatitis: No  Severe constipation: No  Gastrourinary  History of kidney stones: No  Psychiatric  Moods Stable: No  History of drug abuse: No  Endocrine  Polydipsia: No  Polyuria: No  Personal or family history of thyroid cancer:  Neurologic:  Hx of seizures: No  Hx of paresthesias:No        MEDICATIONS:   Current Outpatient Medications   Medication     Calcium Carbonate-Vit D-Min (CALTRATE  PLUS OR)     Cholecalciferol (VITAMIN D) 2000 UNITS CAPS     Cyanocobalamin (B-12) 500 MCG SUBL     Multiple Vitamin (MULTIVITAMIN  S) CAPS     omeprazole (PRILOSEC) 20 MG DR capsule     topiramate (TOPAMAX) 25 MG tablet     triamcinolone (KENALOG) 0.1 % ointment     vitamin B-Complex     No current facility-administered medications for this visit.        LABS:    Hemoglobin A1C   Date Value Ref Range Status   08/28/2020 5.5 4.8 - 5.6 % Final     Comment:              Prediabetes: 5.7 - 6.4           Diabetes: >6.4           Glycemic control for adults with diabetes: <7.0       Vitamin D Deficiency screening   Date Value Ref Range Status   11/18/2020 65 20 - 75 ug/L Final     Comment:     Season, race, dietary intake, and treatment affect the concentration of   25-hydroxy-Vitamin D. Values may decrease during winter months and increase   during summer months. Values 20-29 ug/L may indicate Vitamin D insufficiency   and values <20 ug/L may indicate Vitamin D deficiency.  Vitamin D determination is routinely performed by an immunoassay specific for   25 hydroxyvitamin D3.  If an individual is on vitamin D2 (ergocalciferol)   supplementation, please specify 25 OH vitamin D2 and D3 level determination by   LCMSMS test [id number].       Sodium   Date Value Ref Range Status   08/28/2020 140 134 - 144 mmol/L Final     Potassium   Date Value Ref Range Status   08/28/2020 4.2 3.5 - 5.2 mmol/L Final     Chloride   Date Value Ref Range Status   08/28/2020 101 96 - 106 mmol/L Final     Carbon Dioxide   Date Value Ref Range Status   11/19/2013 34 (H) 20 - 32 mmol/L Final     Anion Gap   Date Value Ref Range Status   11/19/2013 3 (L) 6 - 17 mmol/L Final     Glucose   Date Value Ref Range Status   08/28/2020 92 65 - 99 mg/dL Final     Urea Nitrogen   Date Value Ref Range Status   08/28/2020 16 8 - 27 mg/dL Final     BUN/Creatinine Ratio   Date Value Ref Range Status   08/28/2020 17 10 - 24 Final     Creatinine   Date Value Ref Range  "Status   08/28/2020 0.95 0.76 - 1.27 mg/dL Final     GFR Estimate   Date Value Ref Range Status   11/18/2013 83 >60 mL/min/1.7m2 Final     Calcium   Date Value Ref Range Status   08/28/2020 9.0 8.6 - 10.2 mg/dL Final     Bilirubin Total   Date Value Ref Range Status   08/28/2020 0.7 0.0 - 1.2 mg/dL Final     Alkaline Phosphatase   Date Value Ref Range Status   08/28/2020 64 39 - 117 IU/L Final     ALT   Date Value Ref Range Status   08/28/2020 44 0 - 44 IU/L Final     AST   Date Value Ref Range Status   08/28/2020 21 0 - 40 IU/L Final     Cholesterol   Date Value Ref Range Status   08/28/2020 162 100 - 199 mg/dL Final     HDL Cholesterol   Date Value Ref Range Status   08/28/2020 42 >39 mg/dL Final     LDL Cholesterol Calculated   Date Value Ref Range Status   08/28/2020 100 (H) 0 - 99 mg/dL Final     Comment:     **Effective August 31, 2020, [facility] is implementing an improved**  equation to calculate Low Density Lipoprotein Cholesterol (LDL-C)  concentrations, to be used in all lipid panels that report calculated  LDL-C. This equation was developed through a collaboration with the  National Heart, Lung and Blood Institutes of Health (NIH).[1] The NIH  calculation overcomes the limitations of the existing Friedewald  LDL-C equation and performs equally well in both fasting and  non-fasting individuals.  1. Saman SALGADO, Alison C, Belén Q, et al. A new equation for  calculation of low-density lipoprotein cholesterol in patients with  normolipidemia and/or hypertriglyceridemia. JAVAD Cardiol.  2020 Feb 26. doi:10.1001/jamacardio.2020.0013       Triglycerides   Date Value Ref Range Status   08/28/2020 101 0 - 149 mg/dL Final        PE:  Ht 6' 2\" (1.88 m)   Wt 236 lb (107 kg)   BMI 30.30 kg/m    GENERAL Pt sounds alert, in NAD  Psych: Affect sounds normal  Respiratory: Patient is speaking in full sentences, no coughing or audible wheezing      ASSESSMENT/PLAN:   Obesity BMI 30  Bariatric status  Osteoarthritis in multiple " joints    Congratulated patient on his weight loss.  Placed a lab for patient to have drawn for medication management.  Discussed adequate water intake.  Refilled topamax for next 3 months.  He is to see diet in February and provider in March.  Patient verbalized understanding of this treatment plan,       Jann Arteaga MS, PA-C

## 2020-12-21 DIAGNOSIS — Z79.899 MEDICATION MANAGEMENT: ICD-10-CM

## 2020-12-21 DIAGNOSIS — Z98.84 BARIATRIC SURGERY STATUS: ICD-10-CM

## 2020-12-21 PROCEDURE — 80048 BASIC METABOLIC PNL TOTAL CA: CPT | Performed by: INTERNAL MEDICINE

## 2020-12-21 PROCEDURE — 36415 COLL VENOUS BLD VENIPUNCTURE: CPT | Performed by: INTERNAL MEDICINE

## 2020-12-22 LAB
ANION GAP SERPL CALCULATED.3IONS-SCNC: 4 MMOL/L (ref 3–14)
BUN SERPL-MCNC: 24 MG/DL (ref 7–30)
CALCIUM SERPL-MCNC: 8.4 MG/DL (ref 8.5–10.1)
CHLORIDE SERPL-SCNC: 110 MMOL/L (ref 94–109)
CO2 SERPL-SCNC: 26 MMOL/L (ref 20–32)
CREAT SERPL-MCNC: 0.97 MG/DL (ref 0.66–1.25)
GFR SERPL CREATININE-BSD FRML MDRD: 83 ML/MIN/{1.73_M2}
GLUCOSE SERPL-MCNC: 118 MG/DL (ref 70–99)
POTASSIUM SERPL-SCNC: 3.9 MMOL/L (ref 3.4–5.3)
SODIUM SERPL-SCNC: 140 MMOL/L (ref 133–144)

## 2021-02-18 ENCOUNTER — VIRTUAL VISIT (OUTPATIENT)
Dept: SURGERY | Facility: CLINIC | Age: 61
End: 2021-02-18
Payer: COMMERCIAL

## 2021-02-18 VITALS — BODY MASS INDEX: 30.17 KG/M2 | WEIGHT: 235 LBS

## 2021-02-18 DIAGNOSIS — Z98.84 GASTRIC BYPASS STATUS FOR OBESITY: ICD-10-CM

## 2021-02-18 PROCEDURE — 97803 MED NUTRITION INDIV SUBSEQ: CPT | Mod: 95 | Performed by: DIETITIAN, REGISTERED

## 2021-02-18 NOTE — PROGRESS NOTES
"Video-Visit Details    Type of service:  Video Visit    Video Start Time (time video started): 8:58     Video End Time (time video stopped): 9:17     Originating Location (pt. Location): Home    Distant Location (provider location):  [facility] [place]     Mode of Communication:  Video Conference via [employer]    NUTRITION POST OP APPOINTMENT and MEDICAL WEIGHT MANAGEMENT    DATE OF VISIT: February 18, 2021    Ethan Weiss  1960  male  [id number]  60 year old     ASSESSMENT:    REASON FOR VISIT:  Ethan is a 60 year old year old male presents today for 7 year PO nutrition follow-up appointment. Patient is accompanied by self.  Patient started medical weight loss program at post op appointment with initiation of weight loss medication.     DIAGNOSIS:  Status post gastric bypass surgery.  Obesity Obesity Grade I BMI 30-34.9     ANTHROPOMETRICS:  Initial Weight: 318 lbs    Height: 6' 2\"    Previous Weight: 245 lbs 3.2 oz    Current Weight: 235 lbs   BMI: Body mass index is 30.7 kg/m .      VITAMINS AND MINERALS:  2 Multivitamin with Minerals (AM)  400 mg Calcium With Vitamin D (lunch, dinner, bedtime)  2000 International units Vitamin D  500 mcg Vitamin B-12 sublingual 5x/week   No iron needed per clinic guidelines  Vitamin B Complex daily     NUTRITION HISTORY:  Breakfast: decaf skinny latte switched to matcha; protein shake   Lunch: european bread with gouda cheese + apple (whole apple)   Supper: chicken + vegetables   Snacks: protein shake; Greenlandic digestive cookies   Fluids consumed: Water, Crystal Light and Protein Drink  Consuming liquid calories: Yes  Protein intake: 70+ grams/day  Tolerate regular texture food: Yes  Any foods not tolerated details: Yes  If any food not tolerated: beef  Portion size: 1 cup or more  Take 20-30 minutes to consume each meal: Yes   Eat protein foods first: Yes  Fluids and meals separate by at least 30 minutes: Yes  Chew foods " thoroughly: Yes  Tolerating diet: Yes  Drinking high protein supplements: Yes  Consuming snacks per day: 0-1  Additional Information: Patient started Topamax in November.  Patient continues to feel that medication is not working and does not feel any different.  Patient appreciative of cookbook recommendations as found bariatric bible cookbook very useful.  Patient states weight bounces between 236-238 lbs and would like to weight <230 lbs.  Patient weighs self daily.      PHYSICAL ACTIVITY:  Type: 30-90 minutes walking, running, rowing machine-20 minutes . Patient has increased rowing due to cold weather.  Frequency (days per week): 5-7  Duration (min): 30-60      DIAGNOSIS:  Previous Nutrition Diagnosis: Altered gastrointestinal function related to alteration in gastrointestinal structure as evidenced by history of gastric bypass surgery.- no change    Previous goals:  Aim for 1 cup of food at each meal-improving  Replace snacks with milk or protein drink-improving  Separate fluids and meals by 30 minutes-met  Take 20-30 minutes to eat each meal-met     Current Nutrition Diagnosis: Altered gastrointestinal function related to alteration in gastrointestinal structure as evidenced by history of gastric bypass surgery.    INTERVENTION:   Nutrition Prescription: Eat 3 meals a day at regular intervals. Consume 60-90 grams of protein daily. Follow post-surgical vitamin and mineral protocol.  Assessed learning needs and learning preferences.    GOALS:  Limit portions to 1 cup   If not losing weight, track intake on rose marie for calories   Discuss medication with PA-C at next follow up appointment    Implementation: Discussed progress toward previous goals; reinforced importance of following bariatric lifestyle changes.    NUTRITION MONITORING AND EVALUATION:  Expected patient engagement: good  Verbalized good understanding.    Follow up: Patient to follow up per surgical weight loss protocol as will likely not continue  medical weight loss route.    TIME SPENT WITH PATIENT:  19 minutes    Virgil Simons, RD, LD  [facility] Outpatient Dietitian  [phone] (office phone)

## 2021-03-03 ENCOUNTER — VIRTUAL VISIT (OUTPATIENT)
Dept: SURGERY | Facility: CLINIC | Age: 61
End: 2021-03-03
Payer: COMMERCIAL

## 2021-03-03 VITALS — WEIGHT: 237 LBS | BODY MASS INDEX: 30.42 KG/M2 | HEIGHT: 74 IN

## 2021-03-03 DIAGNOSIS — M15.0 PRIMARY OSTEOARTHRITIS INVOLVING MULTIPLE JOINTS: ICD-10-CM

## 2021-03-03 DIAGNOSIS — Z98.84 BARIATRIC SURGERY STATUS: ICD-10-CM

## 2021-03-03 DIAGNOSIS — E66.811 OBESITY, CLASS I, BMI 30-34.9: Primary | ICD-10-CM

## 2021-03-03 PROCEDURE — 99215 OFFICE O/P EST HI 40 MIN: CPT | Mod: 95 | Performed by: PHYSICIAN ASSISTANT

## 2021-03-03 ASSESSMENT — MIFFLIN-ST. JEOR: SCORE: 1949.77

## 2021-03-03 NOTE — PROGRESS NOTES
Ethan is a 60 year old who is being evaluated via a billable video visit.      If the video visit is dropped, the invitation should be resent by: Text to cell phone: [phone]  Will anyone else be joining your video visit? No      Video-Visit Details    Type of service:  Video Visit    Video Start Time: 9:08    Video End Time:  9:29    41 minutes spent on the date of the encounter doing chart review, review of test results, patient visit and documentation     Originating Location (pt. Location): Home    Distant Location (provider location):  [facility] [place]     Platform used for Video Visit: Sean Arteaga PA-C        March 3, 2021          Return Virtual Medical Weight Management Note         Ethan Weiss  MRN:  [id number]  :  1960      Dear Cuauhtemoc Arredondo,    I had the pleasure of doing a virtual visit with your patient Ethan Weiss.  He is a 60 year old male who I am continuing to see for treatment of obesity.    INTERVAL HISTORY:  Patient is 7 years S/P Gastric Bypass presenting today for weight management.  He has continued taking topamax 75 mg after initially starting 2020. Things were going well initially but does not feel like the topamax is having any effect on his appetite anymore.  He feels like he is sticking with the bariatric lifestyle pretty well and does not feel any more hungry. He is also snacking less. Denies any side effects from the medication.  His exercise is less in the winter.        CURRENT WEIGHT (PATIENT REPORTED):  237 lbs 0 oz    Wt Readings from Last 4 Encounters:   21 237 lb (107.5 kg)   21 235 lb (106.6 kg)   20 236 lb (107 kg)   20 245 lb 3.2 oz (111.2 kg)     BARIATRIC METRICS:  Current Weight: 237 lb (107.5 kg)(Patient reports)  Body mass index is 30.43 kg/m .   Wt change since last visit (lbs): 2  Cumulative weight loss (lbs): 81      DIETARY HISTORY  Meals Per Day:  3  Food Diary:   B: Premier Protein shake - chocolate most days  L:  European style bread with Princess on it and an apple  D: White chili chicken    Snacks Per Day: 1  Typical Snack: digestive crackers  Fluid Intake: 60+ water with crystal light  Calorie Containing Beverages: No  Typical Protein Food Choices: meat  Meals at Restaurant per week: None    Positive Changes Since Last Visit: smaller portions    Getting Adequate Protein: Yes  Sleeping 7-8 hours/day: Yes  Stress management: good      Exercise: Walking dogs most days of the week.  Does 1.5 hour walks with dogs 5 days weekly.      ROS:  Fatigue: No  Sleep Quality: good  Birth Control: NA  HEENT  Hx of glaucoma: No  Vision changes: No  Cardiovascular  Chest Pain with Exertion: No  Palpitations: No  Hx of heart disease: No  Pulmonary  Shortness of breath at rest: No  Shortness of breath with exertion: No  Snoring: No  Gastrointestinal  Heartburn: yes takes omeprazole daily and is well controlled  Abdominal pain: No  History of pancreatitis: No  Severe constipation: No  Gastrourinary  History of kidney stones: No  Psychiatric  Moods Stable: No  History of drug abuse: No  Endocrine  Polydipsia: No  Polyuria: No  Personal or family history of thyroid cancer:  Neurologic:  Hx of seizures: No  Hx of paresthesias:No        MEDICATIONS:   Current Outpatient Medications   Medication     Calcium Carbonate-Vit D-Min (CALTRATE PLUS OR)     Cholecalciferol (VITAMIN D) 2000 UNITS CAPS     Cyanocobalamin (B-12) 500 MCG SUBL     Multiple Vitamin (MULTIVITAMIN  S) CAPS     omeprazole (PRILOSEC) 20 MG DR capsule     topiramate (TOPAMAX) 25 MG tablet     triamcinolone (KENALOG) 0.1 % ointment     vitamin B-Complex     No current facility-administered medications for this visit.        LABS:    Hemoglobin A1C   Date Value Ref Range Status   08/28/2020 5.5 4.8 - 5.6 % Final     Comment:              Prediabetes: 5.7 - 6.4           Diabetes: >6.4           Glycemic control for  adults with diabetes: <7.0       Vitamin D Deficiency screening   Date Value Ref Range Status   11/18/2020 65 20 - 75 ug/L Final     Comment:     Season, race, dietary intake, and treatment affect the concentration of   25-hydroxy-Vitamin D. Values may decrease during winter months and increase   during summer months. Values 20-29 ug/L may indicate Vitamin D insufficiency   and values <20 ug/L may indicate Vitamin D deficiency.  Vitamin D determination is routinely performed by an immunoassay specific for   25 hydroxyvitamin D3.  If an individual is on vitamin D2 (ergocalciferol)   supplementation, please specify 25 OH vitamin D2 and D3 level determination by   LCMSMS test [id number].       Sodium   Date Value Ref Range Status   12/21/2020 140 133 - 144 mmol/L Final     Potassium   Date Value Ref Range Status   12/21/2020 3.9 3.4 - 5.3 mmol/L Final     Chloride   Date Value Ref Range Status   12/21/2020 110 (H) 94 - 109 mmol/L Final     Carbon Dioxide   Date Value Ref Range Status   12/21/2020 26 20 - 32 mmol/L Final     Anion Gap   Date Value Ref Range Status   12/21/2020 4 3 - 14 mmol/L Final     Glucose   Date Value Ref Range Status   12/21/2020 118 (H) 70 - 99 mg/dL Final     Comment:     Non Fasting     Urea Nitrogen   Date Value Ref Range Status   12/21/2020 24 7 - 30 mg/dL Final     Creatinine   Date Value Ref Range Status   12/21/2020 0.97 0.66 - 1.25 mg/dL Final     GFR Estimate   Date Value Ref Range Status   12/21/2020 83 >60 mL/min/[1.73_m2] Final     Comment:     Non  GFR Calc  Starting 12/18/2018, serum creatinine based estimated GFR (eGFR) will be   calculated using the Chronic Kidney Disease Epidemiology Collaboration   (CKD-EPI) equation.       Calcium   Date Value Ref Range Status   12/21/2020 8.4 (L) 8.5 - 10.1 mg/dL Final     Bilirubin Total   Date Value Ref Range Status   08/28/2020 0.7 0.0 - 1.2 mg/dL Final     Alkaline Phosphatase   Date Value Ref Range Status   08/28/2020 64 39 -  "117 IU/L Final     ALT   Date Value Ref Range Status   08/28/2020 44 0 - 44 IU/L Final     AST   Date Value Ref Range Status   08/28/2020 21 0 - 40 IU/L Final     Cholesterol   Date Value Ref Range Status   08/28/2020 162 100 - 199 mg/dL Final     HDL Cholesterol   Date Value Ref Range Status   08/28/2020 42 >39 mg/dL Final     LDL Cholesterol Calculated   Date Value Ref Range Status   08/28/2020 100 (H) 0 - 99 mg/dL Final     Comment:     **Effective August 31, 2020, [facility] is implementing an improved**  equation to calculate Low Density Lipoprotein Cholesterol (LDL-C)  concentrations, to be used in all lipid panels that report calculated  LDL-C. This equation was developed through a collaboration with the  National Heart, Lung and Blood Institutes of Health (NIH).[1] The NIH  calculation overcomes the limitations of the existing Friedewald  LDL-C equation and performs equally well in both fasting and  non-fasting individuals.  1. Saman M, Alison C, Belén Q, et al. A new equation for  calculation of low-density lipoprotein cholesterol in patients with  normolipidemia and/or hypertriglyceridemia. JAVAD Cardiol.  2020 Feb 26. doi:10.1001/jamacardio.2020.0013       Triglycerides   Date Value Ref Range Status   08/28/2020 101 0 - 149 mg/dL Final        PE:  Ht 6' 2\" (1.88 m)   Wt 237 lb (107.5 kg)   BMI 30.43 kg/m    GENERAL Pt sounds alert, in NAD  Psych: Affect sounds normal  Respiratory: Patient is speaking in full sentences, no coughing or audible wheezing      ASSESSMENT/PLAN:   Obesity BMI 30  Bariatric status  Osteoarthritis in multiple joints    Congratulated patient on his overall weight loss.  Had a good discussion about bariatric lifestyle and expected BMI.  Reviewed the purpose of medications in aiding in weight loss.  Topamax does not currently seem to be impacting his appetite so will have patient discontinue. Discussed tapering off.      He is having 3 meals daily and sticking  to 1 cup of food. Not " feeling hungry in between meals.  Met with dietitian last week and doing well.      Asked patient what he feels like his biggest obstacle to weight loss. He had difficulty answering the question.    Discussed that his diet seems appropriate at this time. It was recommended he increase exercise. Has a rowing machine in basement. Says he is too busy now and will have to shower if he rows. Eventually agreed to row 1-2 times weekly and reduce all snacking. Really emphasized the importance of lifetyle.    Will hold off on any medications. Encouraged patient that long term success will be based on lifestyle and diet.      Jann Arteaga MS, PA-C

## 2021-03-04 NOTE — PATIENT INSTRUCTIONS
It was great seeing you today!      General Bariatric Follow-up Parameters     Follow-up at 3, 6, 12 months, annually thereafter. The goal of follow-up sessions is to ensure adequate vitamin and protein absorption, evaluate food intake behavior, review exercise/activity, and treat wt regain.    Get yearly lab tests ordered by our clinic before or during your visits.    To decrease marginal ulcers/perforation avoid alcohol in excess, caffeine, and NSAIDS (unless indicated for cardioprotection or othewise and opposed by a PPI).    The bariatric team should be aware and evaluate all adverse gastrointestinal symptoms (swallowing trouble,abdominal pain, nausea, vomiting, diarrhea, heartburn, reflux, etc), which can be a sign of complication.     Inability to tolerate textured food (chicken, steak, fish, eggs, beans) is NOT normal and may need to be evaluated by endoscopy .     All  patients should supplement with the following bariatric postoperative vitamins:  2 Complete multivitamins with minerals (at different times than calcium)  Vitamin D 5000 Int Units/125 mg daily   Calcium 600 mg twice daily or 500 mg three times daily   Vitamin B12: 500 mcg of sl daily or 1000 mcg Inj monthly  B complex daily or Thiamine 100 mg weekly  1 Iron/Vit C. Daily for females who menstruate and/or as directed

## 2021-03-19 ENCOUNTER — IMMUNIZATION (OUTPATIENT)
Dept: NURSING | Facility: CLINIC | Age: 61
End: 2021-03-19
Payer: COMMERCIAL

## 2021-03-19 PROCEDURE — 0001A PR COVID VAC PFIZER DIL RECON 30 MCG/0.3 ML IM: CPT

## 2021-03-19 PROCEDURE — 91300 PR COVID VAC PFIZER DIL RECON 30 MCG/0.3 ML IM: CPT

## 2021-04-09 ENCOUNTER — IMMUNIZATION (OUTPATIENT)
Dept: NURSING | Facility: CLINIC | Age: 61
End: 2021-04-09
Attending: INTERNAL MEDICINE
Payer: COMMERCIAL

## 2021-04-09 PROCEDURE — 91300 PR COVID VAC PFIZER DIL RECON 30 MCG/0.3 ML IM: CPT

## 2021-04-09 PROCEDURE — 0002A PR COVID VAC PFIZER DIL RECON 30 MCG/0.3 ML IM: CPT

## 2021-08-26 NOTE — PROGRESS NOTES
3  SUBJECTIVE:   CC: Ethan Weiss is an 60 year old male who presents for preventive health visit.     Left shoulder hurts on the top a bit. For a couple of months.  Especially in bed. 2/10  Has had tennis elbow on that side.    Weight continues to be good      Patient has been advised of split billing requirements and indicates understanding: Yes  Healthy Habits:    Do you get at least three servings of calcium containing foods daily (dairy, green leafy vegetables, etc.)? yes    Amount of exercise or daily activities, outside of work: 7 day(s) per week    Problems taking medications regularly No    Medication side effects: No    Have you had an eye exam in the past two years? yes    Do you see a dentist twice per year? yes    Do you have sleep apnea, excessive snoring or daytime drowsiness?no  Hearing Screening:    Left Ear-  500Hz: Pass  1000Hz: Pass  2000Hz: Pass  4000Hz: FAIL    Right Ear-  500Hz: Pass  1000Hz: Pass  2000Hz: Pass  4000Hz: FAIL          PROBLEMS TO ADD ON...    Today's PHQ-2 Score:   PHQ-2 ( 1999 Pfizer) 8/30/2021 8/28/2020   Q1: Little interest or pleasure in doing things 0 0   Q2: Feeling down, depressed or hopeless 1 0   PHQ-2 Score 1 0       Abuse: Current or Past(Physical, Sexual or Emotional)- No  Do you feel safe in your environment? Yes        Social History     Tobacco Use     Smoking status: Never Smoker     Smokeless tobacco: Never Used   Substance Use Topics     Alcohol use: Yes     Alcohol/week: 0.0 - 1.0 standard drinks     Comment: 1 drink per week     If you drink alcohol do you typically have >3 drinks per day or >7 drinks per week? No                      Last PSA: No results found for: PSA    Reviewed orders with patient. Reviewed health maintenance and updated orders accordingly - Yes  Lab work is in process    Reviewed and updated as needed this visit by clinical staff  Tobacco  Allergies  Meds              Reviewed and updated as needed this visit by Provider         "        Past Medical History:   Diagnosis Date     Atopic eczema      Cluster headache     rare in 2016     Family history of prostate cancer     dad     Gastric bypass status for obesity      Health Care Home      History of anxiety     2016 resolved     Hyperlipidaemia LDL goal < 100      IGT (impair glucose tolerance)     history of     Obesity     s/p gastric bypass       Past Surgical History:   Procedure Laterality Date     APPENDECTOMY  1970     LAPAROSCOPIC BYPASS GASTRIC  11/18/2013    Procedure: LAPAROSCOPIC BYPASS GASTRIC;  LAPARSCOPIC MARITA-EN-Y GASTRIC BYPASS;  Surgeon: August Flores MD;  Location: [facility] OR     SALIVARY GLAND SURGERY       TONSILLECTOMY & ADENOIDECTOMY       VARICOCELECTOMY         ROS:  CONSTITUTIONAL: NEGATIVE for fever, chills, change in weight  INTEGUMENTARY/SKIN: NEGATIVE for worrisome rashes, moles or lesions  EYES: NEGATIVE for vision changes or irritation  ENT: NEGATIVE for ear, mouth and throat problems  RESP: NEGATIVE for significant cough or SOB  CV: NEGATIVE for chest pain, palpitations or peripheral edema  GI: NEGATIVE for nausea, abdominal pain, heartburn, or change in bowel habits   male: negative for dysuria, hematuria, decreased urinary stream, erectile dysfunction, urethral discharge  MUSCULOSKELETAL: NEGATIVE for significant arthralgias or myalgia  NEURO: NEGATIVE for weakness, dizziness or paresthesias  PSYCHIATRIC: NEGATIVE for changes in mood or affect    OBJECTIVE:   /70   Pulse 63   Ht 1.861 m (6' 1.25\")   Wt 110.2 kg (243 lb)   SpO2 99%   BMI 31.84 kg/m    EXAM:  GENERAL: healthy, alert and no distress  EYES: Eyes grossly normal to inspection, PERRL and conjunctivae and sclerae normal  HENT: ear canals and TM's normal, nose and mouth without ulcers or lesions  NECK: no adenopathy, no asymmetry, masses, or scars and thyroid normal to palpation  RESP: lungs clear to auscultation - no rales, rhonchi or wheezes  CV: regular rate and rhythm, normal S1 " S2, no S3 or S4, no murmur, click or rub, no peripheral edema and peripheral pulses strong  ABDOMEN: soft, nontender, no hepatosplenomegaly, no masses and bowel sounds normal   (male): normal male genitalia without lesions or urethral discharge, no hernia  RECTAL: normal sphincter tone, no rectal masses, prostate normal size, smooth, nontender without nodules or masses  MS: no gross musculoskeletal defects noted, no edema  SKIN: no suspicious lesions or rashes  NEURO: Normal strength and tone, mentation intact and speech normal  PSYCH: mentation appears normal, affect normal/bright    Diagnostic Test Results:  Labs reviewed in Epic    ASSESSMENT/PLAN:   Ethan was seen today for physical and hearing screening.    Diagnoses and all orders for this visit:    Routine general medical examination at a health care facility    Postsurgical malabsorption  Doing well  -     Comp. Metabolic Panel (14) ([facility])    Family history of prostate cancer  Due for a check  -     PSA Serum ([facility])    IGT (impair glucose tolerance)  Discussed impaired glucose tolerance, and its part in the dysmetabolic syndrome.  Discussed the progression of impaired glucose tolerance toward diabetes mellitus and the need for agressive interventions now to delay and prevent this inevitable progression.  Discussed the overall risks that dysmetabolic syndromes/impaired glucose tolerance/syndrome X pose toward increased risks of vascular disease as the main reason for agressive intervention now.  Will add medications for glucose control (i.e. metformin, glitazones, etc), lipid (e.g. statins), and for blood pressure (preferably ARBs and ACE) as indicated.  Will start these as early as needed based other proven ability to delay and modify these risk factors.    -     Lipid Panel ([facility])  -     Hemoglobin A1C ([facility])    GERD  Discussed the functional nature of this condition regarding what they eat, how they eat, when they eat, and how much they eat  "as all contributing to gastroesophageal symptoms.    Recommend anti-reflux diet and eating patterns emphasizing smaller more frequent meals and timing relative to bedtime.  Also recommend avoiding tomatoes, onions, spicy foods, caffeine, or any other food/beverage that cause increased reflux.    If symptoms not controlled on current therapies, then need to return for further evaluation and consideration of further studies.      Patient has been advised of split billing requirements and indicates understanding: Yes  COUNSELING:  Reviewed preventive health counseling, as reflected in patient instructions       Regular exercise       Healthy diet/nutrition    Estimated body mass index is 31.84 kg/m  as calculated from the following:    Height as of this encounter: 1.861 m (6' 1.25\").    Weight as of this encounter: 110.2 kg (243 lb).    Weight management plan: Discussed healthy diet and exercise guidelines    He reports that he has never smoked. He has never used smokeless tobacco.      Counseling Resources:  ATP IV Guidelines  Pooled Cohorts Equation Calculator  FRAX Risk Assessment  ICSI Preventive Guidelines  Dietary Guidelines for Americans, 2010  USDA's MyPlate  ASA Prophylaxis  Lung CA Screening    Cuauhtemoc Arredondo MD  [facility]  "

## 2021-08-30 ENCOUNTER — OFFICE VISIT (OUTPATIENT)
Dept: FAMILY MEDICINE | Facility: CLINIC | Age: 61
End: 2021-08-30

## 2021-08-30 VITALS
HEART RATE: 63 BPM | DIASTOLIC BLOOD PRESSURE: 70 MMHG | SYSTOLIC BLOOD PRESSURE: 122 MMHG | OXYGEN SATURATION: 99 % | WEIGHT: 243 LBS | HEIGHT: 73 IN | BODY MASS INDEX: 32.2 KG/M2

## 2021-08-30 DIAGNOSIS — K21.00 GASTROESOPHAGEAL REFLUX DISEASE WITH ESOPHAGITIS WITHOUT HEMORRHAGE: ICD-10-CM

## 2021-08-30 DIAGNOSIS — R73.02 IMPAIRED GLUCOSE TOLERANCE: ICD-10-CM

## 2021-08-30 DIAGNOSIS — Z00.00 ROUTINE GENERAL MEDICAL EXAMINATION AT A HEALTH CARE FACILITY: Primary | ICD-10-CM

## 2021-08-30 DIAGNOSIS — Z80.42 FAMILY HISTORY OF PROSTATE CANCER: ICD-10-CM

## 2021-08-30 DIAGNOSIS — K91.2 POSTSURGICAL MALABSORPTION: ICD-10-CM

## 2021-08-30 DIAGNOSIS — R05.9 COUGH: ICD-10-CM

## 2021-08-30 LAB
% GRANULOCYTES: 71.9 % (ref 42.2–75.2)
HCT VFR BLD AUTO: 42.1 % (ref 39–51)
HEMOGLOBIN: 14.9 G/DL (ref 13.4–17.5)
LYMPHOCYTES NFR BLD AUTO: 21.2 % (ref 20.5–51.1)
MCH RBC QN AUTO: 29.6 PG (ref 27–31)
MCHC RBC AUTO-ENTMCNC: 35.4 G/DL (ref 33–37)
MCV RBC AUTO: 83.6 FL (ref 80–100)
MONOCYTES NFR BLD AUTO: 6.9 % (ref 1.7–9.3)
PLATELET # BLD AUTO: 188 K/UL (ref 140–450)
RBC # BLD AUTO: 5.04 X10/CMM (ref 4.2–5.9)
WBC # BLD AUTO: 4.6 X10/CMM (ref 3.8–11)

## 2021-08-30 PROCEDURE — 36415 COLL VENOUS BLD VENIPUNCTURE: CPT | Performed by: FAMILY MEDICINE

## 2021-08-30 PROCEDURE — 99396 PREV VISIT EST AGE 40-64: CPT | Performed by: FAMILY MEDICINE

## 2021-08-30 PROCEDURE — 85025 COMPLETE CBC W/AUTO DIFF WBC: CPT | Performed by: FAMILY MEDICINE

## 2021-08-30 ASSESSMENT — MIFFLIN-ST. JEOR: SCORE: 1965.08

## 2021-08-30 ASSESSMENT — PATIENT HEALTH QUESTIONNAIRE - PHQ9
5. POOR APPETITE OR OVEREATING: NOT AT ALL
SUM OF ALL RESPONSES TO PHQ QUESTIONS 1-9: 2

## 2021-08-30 ASSESSMENT — ANXIETY QUESTIONNAIRES
IF YOU CHECKED OFF ANY PROBLEMS ON THIS QUESTIONNAIRE, HOW DIFFICULT HAVE THESE PROBLEMS MADE IT FOR YOU TO DO YOUR WORK, TAKE CARE OF THINGS AT HOME, OR GET ALONG WITH OTHER PEOPLE: NOT DIFFICULT AT ALL
3. WORRYING TOO MUCH ABOUT DIFFERENT THINGS: NOT AT ALL
GAD7 TOTAL SCORE: 1
5. BEING SO RESTLESS THAT IT IS HARD TO SIT STILL: NOT AT ALL
6. BECOMING EASILY ANNOYED OR IRRITABLE: NOT AT ALL
7. FEELING AFRAID AS IF SOMETHING AWFUL MIGHT HAPPEN: NOT AT ALL
2. NOT BEING ABLE TO STOP OR CONTROL WORRYING: SEVERAL DAYS
1. FEELING NERVOUS, ANXIOUS, OR ON EDGE: NOT AT ALL

## 2021-08-30 NOTE — PATIENT INSTRUCTIONS
"Thank you for coming in today!   If you receive a survey via My Chart, mail or phone, please let us know if there was anything you especially appreciated today or if there is any way we can improve our clinic. We value your input.     Likewise, we are working hard to attend to our digital reputation.    Please consider reviewing our clinic on Google and/or Facebook via the following links:    [email or web address]    We truly appreciate you taking the time to do this!    General Information:    Today you had your appointment with Cuauhtemoc Arredondo MD    I am in the clinic:  Monday and Friday mornings  Tuesday and Thursday afternoons    I am not in the office Wednesdays, non urgent calls received on Wednesday will be addressed when I am back in the office on Thursday.    If lab work was done today as part of your evaluation you will generally be contacted via My Chart, mail, or phone with the results within 1-5 days. If there is an alarming result we will contact you by phone. Lab results come back at varying times, I generally wait until all labs are resulted before making comments on results. Please note labs are automatically released to My Chart once available.    If you need refills please contact your pharmacist. They will send a refill request to me to review. Please allow 3 business days for us to process all refill requests.    Please call or send a medical message with any questions or concerns.    Thank you for choosing [facility].  We truly appreciate you trusting us with your medical care.    Your next visit with us should be scheduled for Follow up in 1 year.     Listed next are the medical health Maintenance items we are tracking for your care and when they are next due (or overdue!)  Our goal is 100% \"up to date.\" Keep this list handy to call and schedule what you are due for in the future!    Health " Maintenance Due   Topic Date Due     HIV SCREENING  Never done     INFLUENZA VACCINE (1) 09/01/2021       Sincerely,    Cuauhtemoc Arredondo MD

## 2021-08-31 LAB
ALBUMIN SERPL-MCNC: 4.5 G/DL (ref 3.8–4.8)
ALBUMIN/GLOB SERPL: 2 {RATIO} (ref 1.2–2.2)
ALP SERPL-CCNC: 81 IU/L (ref 48–121)
ALT SERPL-CCNC: 23 IU/L (ref 0–44)
AST SERPL-CCNC: 10 IU/L (ref 0–40)
BILIRUB SERPL-MCNC: 0.7 MG/DL (ref 0–1.2)
BUN SERPL-MCNC: 17 MG/DL (ref 8–27)
BUN/CREATININE RATIO: 18 (ref 10–24)
CALCIUM SERPL-MCNC: 9.2 MG/DL (ref 8.6–10.2)
CHLORIDE SERPLBLD-SCNC: 102 MMOL/L (ref 96–106)
CHOLEST SERPL-MCNC: 178 MG/DL (ref 100–199)
CREAT SERPL-MCNC: 0.93 MG/DL (ref 0.76–1.27)
EGFR IF AFRICN AM: 102 ML/MIN/1.73
EGFR IF NONAFRICN AM: 88 ML/MIN/1.73
GLOBULIN, TOTAL: 2.2 G/DL (ref 1.5–4.5)
GLUCOSE SERPL-MCNC: 95 MG/DL (ref 65–99)
HBA1C MFR BLD: 5.3 % (ref 4.8–5.6)
HDLC SERPL-MCNC: 45 MG/DL
LDL/HDL RATIO: 2.5 RATIO (ref 0–3.6)
LDLC SERPL CALC-MCNC: 113 MG/DL (ref 0–99)
POTASSIUM SERPL-SCNC: 3.9 MMOL/L (ref 3.5–5.2)
PROT SERPL-MCNC: 6.7 G/DL (ref 6–8.5)
PSA NG/ML: 3.4 NG/ML (ref 0–4)
SODIUM SERPL-SCNC: 142 MMOL/L (ref 134–144)
TOTAL CO2: 25 MMOL/L (ref 20–29)
TRIGL SERPL-MCNC: 109 MG/DL (ref 0–149)
VLDLC SERPL CALC-MCNC: 20 MG/DL (ref 5–40)

## 2021-08-31 ASSESSMENT — ANXIETY QUESTIONNAIRES: GAD7 TOTAL SCORE: 1

## 2021-09-12 ENCOUNTER — HEALTH MAINTENANCE LETTER (OUTPATIENT)
Age: 61
End: 2021-09-12

## 2021-09-30 ENCOUNTER — IMMUNIZATION (OUTPATIENT)
Dept: FAMILY MEDICINE | Facility: CLINIC | Age: 61
End: 2021-09-30
Payer: COMMERCIAL

## 2021-09-30 DIAGNOSIS — Z23 NEED FOR PROPHYLACTIC VACCINATION AND INOCULATION AGAINST INFLUENZA: Primary | ICD-10-CM

## 2021-09-30 PROCEDURE — 90682 RIV4 VACC RECOMBINANT DNA IM: CPT

## 2021-09-30 PROCEDURE — 90471 IMMUNIZATION ADMIN: CPT

## 2021-09-30 PROCEDURE — 99207 PR NO CHARGE NURSE ONLY: CPT

## 2021-10-15 ENCOUNTER — IMMUNIZATION (OUTPATIENT)
Dept: NURSING | Facility: CLINIC | Age: 61
End: 2021-10-15
Payer: COMMERCIAL

## 2021-10-15 PROCEDURE — 91300 PR COVID VAC PFIZER DIL RECON 30 MCG/0.3 ML IM: CPT

## 2021-10-15 PROCEDURE — 0004A PR COVID VAC PFIZER DIL RECON 30 MCG/0.3 ML IM: CPT

## 2021-11-20 NOTE — PROGRESS NOTES
"Video-Visit Details    Type of service:  Video Visit    Video Start Time (time video started): 10:04    Video End Time (time video stopped): 10:30    Originating Location (pt. Location): Home    Distant Location (provider location):  [facility] [place]     Mode of Communication:  Video Conference via [employer] phone (due to disconnect at end of visit)        NUTRITION POST OP APPOINTMENT  DATE OF VISIT: November 20, 2021    Ethan Weiss  1960  male  [id number]  60 year old     ASSESSMENT:    REASON FOR VISIT:  Ethan is a 60 year old year old male presents today for 8 year PO nutrition follow-up appointment. Patient is accompanied by self.    DIAGNOSIS:  Status post gastric bypass surgery.  Obesity Grade I BMI 30-34.9     ANTHROPOMETRICS:  Initial Weight: 318 lb   Height:  6'2\"  Previous visit weight:  235 lb  Current Weight: 239 lb    BMI: 30.69  kg/(m^2).    VITAMINS AND MINERALS:  2 Multivitamin with Minerals (AM)  400 mg Calcium With Vitamin D (lunch, dinner, bedtime)  2000 International units Vitamin D  500 mcg Vitamin B-12 sublingual 5x/week   No iron needed per clinic guidelines  Vitamin B Complex daily     NUTRITION HISTORY:  Breakfast: protein drink  Lunch: 1-2 slices of whole wheat toast with peanut butter  Supper: chicken, non-starch veggie, white rice  Snacks: out of boredom-English cookie ([employer]) or licorice  Fluids consumed:48-64 oz Crystal Light tea (no caffeine) protein drinks, Matcha Tea, ETOH 1 drink pr month  Consuming liquid calories: Yes  Protein intake: 60-65 grams/day  Tolerate regular texture food: Yes  Any foods not tolerated details: Yes  If any food not tolerated: beef  Portion size: 1 cup  Take 20-30 minutes to consume each meal: No   Eat protein foods first: Yes  Fluids and meals separate by at least 30 minutes: No  Chew foods thoroughly: Yes  Tolerating diet: Yes  Drinking high protein supplements: Yes  Consuming snacks per day: " yes  Additional Information: Still would like to get below 230 lb, but struggles with wanting high carbohydrate snacks. He is not terribly concerned about weigh being up ~ 4 lab from last year. One of his dogs  and he feels this has made him less active.         PHYSICAL ACTIVITY:  Type: walking or rowing machine (high intensity) or yoga or resistance bands  Frequency (days per week): 6  Duration (min): 20-90    DIAGNOSIS:  Previous Nutrition Diagnosis: Altered gastrointestinal function related to alteration in gastrointestinal structure as evidenced by history of gastric bypass surgery.- no change    Previous goals:  Limit portions to 1 cup-met   If not losing weight, track intake on rose marie for calories-not met   Discuss medication with PA-C at next follow up appointment-met (did not help with weight loss)      Current Nutrition Diagnosis: Altered gastrointestinal function related to alteration in gastrointestinal structure as evidenced by history of gastric bypass surgery.    INTERVENTION:   Nutrition Prescription: Eat 3 meals a day at regular intervals. Consume 60-90 grams of protein daily. Follow post-surgical vitamin and mineral protocol.  Assessed learning needs and learning preferences. Congratulated patient on overall weight loss of ~ 80 pounds.    GOALS:  Consistently eat 15-20 grams of protein at lunch (will send Protein Sources for Weight Loss and Keeping Up Your Diet after Weight Loss Surgery via [employer])/overall protein goal= grams (1-1.5 g/ kg IBW) per day  Replace Matcha tea with decaf/ herbal tea or decaf skim latte  Replace snacks with a second protein drink (discussed other options for protein drinks)      Implementation: Discussed progress toward previous goals; reinforced importance of following bariatric lifestyle changes.    NUTRITION MONITORING AND EVALUATION:  Anticipated compliance: fair  Verbalized good understanding.    Follow up: Patient to follow up in 12 months.    TIME SPENT  WITH PATIENT:  26 minutes  Gabino Friedman RD, LD  [facility], [place]

## 2021-11-23 ENCOUNTER — VIRTUAL VISIT (OUTPATIENT)
Dept: SURGERY | Facility: CLINIC | Age: 61
End: 2021-11-23
Payer: COMMERCIAL

## 2021-11-23 VITALS — WEIGHT: 239 LBS | BODY MASS INDEX: 30.67 KG/M2 | HEIGHT: 74 IN

## 2021-11-23 DIAGNOSIS — Z98.84 BARIATRIC SURGERY STATUS: ICD-10-CM

## 2021-11-23 DIAGNOSIS — K91.2 POSTSURGICAL MALABSORPTION: ICD-10-CM

## 2021-11-23 DIAGNOSIS — K21.9 GERD WITHOUT ESOPHAGITIS: ICD-10-CM

## 2021-11-23 DIAGNOSIS — E66.811 OBESITY, CLASS I, BMI 30-34.9: Primary | ICD-10-CM

## 2021-11-23 PROCEDURE — 97803 MED NUTRITION INDIV SUBSEQ: CPT | Mod: 95 | Performed by: DIETITIAN, REGISTERED

## 2021-11-23 PROCEDURE — 99213 OFFICE O/P EST LOW 20 MIN: CPT | Mod: 95 | Performed by: PHYSICIAN ASSISTANT

## 2021-11-23 ASSESSMENT — MIFFLIN-ST. JEOR: SCORE: 1953.85

## 2021-11-23 NOTE — PATIENT INSTRUCTIONS
Nice to talk with you today.  Below is our plan we discussed.-  MARGO Forte    Labs have been ordered in the system.You can have these drawn at any [facility] lab.  Please call [phone] set up an appointment.  Your results will be posted on [employer] as soon as they're complete.  After all are resulted, I will review and comment to you.    [employer] is the best way to get a hold of me.     FOLLOW-UP:  Routine Follow up: annually with Dietician and Provider (Jann Arteaga PA-C)  Return if any concerns with your surgery or weight gain occurs between routine follow up.      We are always here for you!             Bariatric Post Op Guidelines  General:    To avoid marginal ulcers avoid all forms of tobacco, alcohol in excess, caffeine, and NSAIDS (unless indicated for cardioprotection or othewise and opposed by a PPI).    Exercise is key for continued weight loss and weight maintenance. Aim for 30-60 minutes of physical activity most days of the week. Include cardiovascular and strength training.    Continue lifelong vitamins supplementation and annual lab follow up.  All  patients should supplement with the following bariatric postoperative vitamins:  2 Complete multivitamins with minerals (at different times than calcium)  Vitamin D 5000 Int Units/125 mg daily   Calcium 600 mg twice daily or 500 mg hree times daily   Vitamin B12: 500 mcg of sl daily or 1000 mcg Inj monthly  B complex daily or Thiamine 100 mg weekly  1 Iron/Vit C. Daily for females who menstruate and/or as directed    The bariatric team should be aware and potentially evaluate all adverse gastrointestinal symptoms which can be a sign of complication. Inability to tolerate textured solid food (chicken, steak, fish) may need to be evaluated by endoscopy.    There is a 10% increase of Alcohol Use Disorder in patients with bariatric surgery.   Most often occurring around 2 years post op.  Please call the clinic if you feel alcohol is interfering in  your daily life.  We can help.     Follow up annually lifelong. Obesity is a chronic disease.  Weight gain can be expected. The goal of follow-up visits is to ensure adequate vitamin and protein absorption, evaluate food intake behavior, review exercise/activity level, and assist with weight regain.    Nutritional:  Eat 3 meals per day  (No snacks between meals.)  Do not skip meals.  This can cause overeating at the next meal and will prevent adequate protein and nutritional intake.    Aim for 60-80 grams of protein per day.  Always eat your protein first. This assists with optimal nutrition and helps you stay full longer.    Eat your protein first, and then follow with fiber.    Add fiber by including fruits, vegetables, whole grains, and beans.     Portions should be about 1 cup per meal. Use measuring cups to be accurate.  Continue to use saucer/salad plates, infant/toddler silverware to keep portion sizes small and take small bites.    Eat S-L-[id number]-W-L-Y to make each meal last 20-30 minutes. Always stop eating when satisfied.    Aim for 64 oz. of calorie-free fluids daily.    Avoid drinking 30 min before, during, and 30 min after meal    Avoid high sugar and high fat foods to prevent high calorie intake. This will reduce your rate of weight loss and can cause weight regain.   Check nutrition labels for less than 10 grams of sugar and less than 10 grams of fat per serving.

## 2021-11-23 NOTE — PROGRESS NOTES
Ethan is a 61 year old who is being evaluated via a billable video visit.      If the video visit is dropped, the invitation should be resent by: Text to cell phone: [phone]  Will anyone else be joining your video visit? No      Video-Visit Details    Type of service:  Video Visit    Video Start Time: 9:40    Video End Time: 9:54    24 minutes spent on the date of the encounter doing chart review, review of test results, patient visit and documentation       Originating Location (pt. Location): Home    Distant Location (provider location):  [facility]     Platform used for Video Visit: Hi[employer]            VIRTUAL BARIATRIC FOLLOW UP        2021      HISTORY OF PRESENT ILLNESS: Pt presents today for his follow-up appointment status post laparoscopic gastric bypass.  Overall has been maintaining his weight well. Was about 5 lbs lower but has gained since is dog  in August.  No real concerns today.      239 lbs 0 oz    Wt Readings from Last 4 Encounters:   21 239 lb (108.4 kg)   21 243 lb (110.2 kg)   21 237 lb (107.5 kg)   21 235 lb (106.6 kg)        BARIATRIC METRICS:  Current Weight: 239 lb (108.4 kg) (pt reported)  Body mass index is 30.69 kg/m .   Wt change since last visit (lbs): 2  Cumulative weight loss (lbs): 79       Patient is taking the following bariatric postoperative vitamins:  2 Complete multivitamins with minerals (at different times than calcium)   2000 International Units of Vitamin D daily  1200 mg of Calcium daily in divided doses  500 mcg of Vitamin B12 sl 5 days weekly  1 B Complex daily    Pt is exercising by walking 5 times weekly along with yoga and strengthing.  Also rows once weekly       OBESITY RELATED CONDITIONS:  High cholesterol - resolved  High Blood pressure - resolved  GERD - resolved  Diabetes Mellitus II - resolved        SOCIAL HISTORY:  Pt denies smoking.  Pt drinks 1 alcoholic beverage per  "month.  Avoids NSAIDS.  Drinks 1 tea daily for 9 months.       REVIEW OF SYSTEMS:     GI:  Nausea- faint nausea sometimes   Vomiting- No  Diarrhea- No  Constipation- No  Dysphagia- No  Abdominal Pain- No  Heartburn- restarted omeprazole earlier this year after having a cough following a cold.       SKIN:  Intertriginous irritation- No     PSYCH:  Depression- No  Anxiety- No        LABS/IMAGING/MEDICAL RECORDS REVIEW:   Vitamin D Deficiency screening   Date Value Ref Range Status   11/18/2020 65 20 - 75 ug/L Final     Comment:     Season, race, dietary intake, and treatment affect the concentration of   25-hydroxy-Vitamin D. Values may decrease during winter months and increase   during summer months. Values 20-29 ug/L may indicate Vitamin D insufficiency   and values <20 ug/L may indicate Vitamin D deficiency.  Vitamin D determination is routinely performed by an immunoassay specific for   25 hydroxyvitamin D3.  If an individual is on vitamin D2 (ergocalciferol)   supplementation, please specify 25 OH vitamin D2 and D3 level determination by   LCMSMS test VITD23.       Parathyroid Hormone Intact   Date Value Ref Range Status   11/18/2020 46 12 - 64 pg/mL Final     Vitamin B12   Date Value Ref Range Status   11/18/2020 855 193 - 986 pg/mL Final     Hemoglobin   Date Value Ref Range Status   08/30/2021 14.9 13.4 - 17.5 g/dl Final     Ferritin   Date Value Ref Range Status   11/18/2020 46 26 - 388 ng/mL Final     Iron   Date Value Ref Range Status   11/18/2020 114 35 - 180 ug/dL Final     Iron Binding Cap   Date Value Ref Range Status   11/18/2020 330 240 - 430 ug/dL Final     Iron Saturation Index   Date Value Ref Range Status   11/18/2020 35 15 - 46 % Final   11/20/2019 36 15 - 46 % Final   11/06/2018 37 15 - 46 % Final       PHYSICAL EXAMINATION:  Ht 6' 2\" (1.88 m)   Wt 239 lb (108.4 kg)   BMI 30.69 kg/m    GENERAL: Healthy, alert and no distress  EYES: Eyes grossly normal to inspection.  No discharge or erythema, " or obvious scleral/conjunctival abnormalities.  RESP: No audible wheeze, cough, or visible cyanosis.  No visible retractions or increased work of breathing.    SKIN: Visible skin clear. No significant rash, abnormal pigmentation or lesions.  NEURO: Cranial nerves grossly intact.  Mentation and speech appropriate for age.  PSYCH: Mentation appears normal, affect normal/bright, judgement and insight intact, normal speech and appearance well-groomed.      ASSESSMENT AND PLAN:      Bariatric Status   8 years SP Gastric Bypass  Obesity BMI 30   Congratulated patient on overall weight loss  Postsurgical malabsorption   Ordered labs per protocol  GERD without esophagitis   Discuss with primary use of omeprazole. Discussed possible impact of caffeine on gastric staple line    Follow up: Return to clinic in one year

## 2021-11-23 NOTE — PATIENT INSTRUCTIONS
Leroy Long-  It was great to visit with you and learn about your progress. Below are the goals we discussed.    GOALS:  Consistently eat 15-20 grams of protein at lunch (will send Protein Sources for Weight Loss via [employer])/overall protein goal= grams (1-1.5 g/ kg IBW) per day  Replace Matcha tea with decaf/ herbal tea or decaf skim latte  Replace snacks with a second protein drink (discussed other options for protein drinks)    Nutrition Educational Materials:  Protein Sources for Weight Loss  [email or web address]   Keeping Up Your Diet after Weight Loss Surgery  [email or web address]      Please call [phone] to schedule your next visit with a Dietitian/ Provider (PA or MD) in 1 year.  Thanks!  Gabino Friedman RD, JOVAN  [facility]

## 2021-11-26 ENCOUNTER — LAB (OUTPATIENT)
Dept: LAB | Facility: CLINIC | Age: 61
End: 2021-11-26
Payer: COMMERCIAL

## 2021-11-26 DIAGNOSIS — K91.2 POSTSURGICAL MALABSORPTION: ICD-10-CM

## 2021-11-26 LAB
FERRITIN SERPL-MCNC: 40 NG/ML (ref 26–388)
IRON SATN MFR SERPL: 27 % (ref 15–46)
IRON SERPL-MCNC: 92 UG/DL (ref 35–180)
PTH-INTACT SERPL-MCNC: 46 PG/ML (ref 18–80)
TIBC SERPL-MCNC: 337 UG/DL (ref 240–430)
VIT B12 SERPL-MCNC: 794 PG/ML (ref 193–986)

## 2021-11-26 PROCEDURE — 82728 ASSAY OF FERRITIN: CPT

## 2021-11-26 PROCEDURE — 82607 VITAMIN B-12: CPT

## 2021-11-26 PROCEDURE — 82306 VITAMIN D 25 HYDROXY: CPT

## 2021-11-26 PROCEDURE — 36415 COLL VENOUS BLD VENIPUNCTURE: CPT

## 2021-11-26 PROCEDURE — 83550 IRON BINDING TEST: CPT

## 2021-11-26 PROCEDURE — 84590 ASSAY OF VITAMIN A: CPT | Mod: 90

## 2021-11-26 PROCEDURE — 83970 ASSAY OF PARATHORMONE: CPT

## 2021-11-26 PROCEDURE — 99000 SPECIMEN HANDLING OFFICE-LAB: CPT

## 2021-11-27 LAB — DEPRECATED CALCIDIOL+CALCIFEROL SERPL-MC: 54 UG/L (ref 20–75)

## 2021-12-01 LAB
ANNOTATION COMMENT IMP: NORMAL
RETINYL PALMITATE SERPL-MCNC: 0.07 MG/L
VIT A SERPL-MCNC: 0.8 MG/L

## 2022-04-26 ENCOUNTER — IMMUNIZATION (OUTPATIENT)
Dept: NURSING | Facility: CLINIC | Age: 62
End: 2022-04-26
Payer: COMMERCIAL

## 2022-04-26 PROCEDURE — 0054A COVID-19,PF,PFIZER (12+ YRS): CPT

## 2022-04-26 PROCEDURE — 91305 COVID-19,PF,PFIZER (12+ YRS): CPT

## 2022-08-02 ENCOUNTER — OFFICE VISIT (OUTPATIENT)
Dept: FAMILY MEDICINE | Facility: CLINIC | Age: 62
End: 2022-08-02

## 2022-08-02 VITALS
OXYGEN SATURATION: 95 % | SYSTOLIC BLOOD PRESSURE: 108 MMHG | HEIGHT: 74 IN | HEART RATE: 60 BPM | TEMPERATURE: 98.4 F | DIASTOLIC BLOOD PRESSURE: 62 MMHG | RESPIRATION RATE: 16 BRPM | BODY MASS INDEX: 31.13 KG/M2 | WEIGHT: 242.6 LBS

## 2022-08-02 DIAGNOSIS — U07.1 INFECTION DUE TO 2019 NOVEL CORONAVIRUS: ICD-10-CM

## 2022-08-02 DIAGNOSIS — R05.9 COUGH: Primary | ICD-10-CM

## 2022-08-02 PROCEDURE — 99214 OFFICE O/P EST MOD 30 MIN: CPT | Performed by: FAMILY MEDICINE

## 2022-08-02 RX ORDER — CODEINE PHOSPHATE AND GUAIFENESIN 10; 100 MG/5ML; MG/5ML
1-2 SOLUTION ORAL EVERY 4 HOURS PRN
Qty: 236 ML | Refills: 1 | Status: SHIPPED | OUTPATIENT
Start: 2022-08-02 | End: 2022-11-23

## 2022-08-02 NOTE — PATIENT INSTRUCTIONS
Instructions for Patients  Your symptoms could be due to COVID-19, but it also could be due to a number of other respiratory illnesses.  We are not doing testing at this time for COVID-19 unless symptoms are severe enough to require hospitalization.  It is recommended that you stay home to prevent the spread of your illness.  To do this follow these instructions:    Regardless of if you have been tested or not:  Patient who have symptoms (cough, fever, or shortness of breath), need to isolate for 7 days from when symptoms started AND 72 hours after fever resolves (without fever reducing medications) AND improvement of respiratory symptoms (whichever is longer).    Isolate yourself at home (in own room/own bathroom if possible)  Do Not allow any visitors  Do Not go to work or school  Do Not go to Confucianist,  centers, shopping, or other public places.  Do Not shake hands.  Avoid close and intimate contact with others (hugging, kissing).  Follow CDC recommendations for household cleaning of frequently touched services.     After the initial 7 days, continue to isolate yourself from household members as much as possible. To continue decrease the risk of community spread and exposure, you and any members of your household should limit activities in public for 14 days after starting home isolation.     You can reference the following CDC link for helpful home isolation/care tips:  https://www.cdc.gov/coronavirus/2019-ncov/downloads/10Things.pdf    Protect Others:  Cover your mouth and nose with a mask, disposable tissue or wash cloth to avoid spreading germs to others.  Wash your hands and face frequently with soap and water.    Fever Medicines:  For fever relief, take acetaminophen or ibuprofen.  Treat fevers above 101  F (38.3  C) to lower fevers and make you more comfortable.   Acetaminophen (e.g., Tylenol): Take 650 mg (two 325 mg pills) by mouth every 4-6 hours as needed of regular strength Tylenol or 1,000 mg  (two 500 mg pills) every 8 hours as needed of Extra Strength Tylenol.   Ibuprofen (e.g., Motrin, Advil): Take 400 mg (two 200 mg pills) by mouth every 6 hours as needed.   Acetaminophen is thought to be safer than ibuprofen or naproxen for people over 65 years old. Acetaminophen is in many OTC and prescription medicines. It might be in more than one medicine that you are taking. You need to be careful and not take an overdose. Before taking any medicine, read all the instructions on the package.  Caution - NSAIDs (e.g., ibuprofen, naproxen): Do not take nonsteroidal anti-inflammatory drugs (NSAIDs) if you have stomach problems, kidney disease, heart failure, or other contraindications to using this type of medicine. Do not take NSAID medicines for over 7 days without consulting your PCP. Do not take NSAID medicines if you are pregnant. Do not take NSAID medicines if you are also taking blood thinners.     Call Back If: Breathing difficulty develops or you become worse.    Additional General Information About COVID-19    You can reference the following CDC link for helpful home isolation/care tips:  https://www.cdc.gov/coronavirus/2019-ncov/downloads/10Things.pdf    COVID-19 - Symptoms:   The COVID-19 can cause a respiratory illness, such as bronchitis or pneumonia.  The most common symptoms are: cough, fever, and shortness of breath.   Other symptoms are: body aches, chills, diarrhea, fatigue, headache, runny nose, and sore throat     COVID-19 - Exposure Risk Factors:  Exposure to a person who has been diagnosed with COVID-19 .  Travel from an area with recent local transmission of COVID-19 .  The CDC (www.cdc.gov) has the most up-to-date list of where the COVID-19 outbreak is occurring.    COVID-19 - Spreading:   The virus likely spreads through respiratory droplets produced when a person coughs or sneezes. These respiratory droplets can travel approximately 6 feet and can remain on surfaces.  Common disinfectants  will kill the virus.  The CDC currently does not recommend healthy people wearing masks.    COVID-19 - Protect Yourself:   Avoid close contact with people known to have this new COVID-19 infection.  Wash hands often with soap and water or alcohol-based hand .  Avoid touching the eyes, nose or mouth.       Thank you for limiting contact with others, wearing a simple mask to cover your cough, practice good hand hygiene habits and accessing our virtual services where possible to limit the spread of this virus.    For more information about COVID19 and options for caring for yourself at home, please visit the CDC website at https://www.cdc.gov/coronavirus/2019-ncov/about/steps-when-sick.html  For more options for care at [facility], please visit our website at [email or web address]   Today's visit was done over a video connection to minimize patients exposure to others during the COVID-19 outbreak.

## 2022-08-02 NOTE — PROGRESS NOTES
Problem(s) Oriented visit        SUBJECTIVE:                                                    Ethan Weiss is a 61 year old male who presents to clinic today for the following health issues :      COVID-19 Symptom Review  How many days ago did these symptoms start? On the 24th    Are any of the following symptoms significant for you?  New or worsening difficulty breathing? Yes    Please describe what kind of difficulty you are having breathing:No dyspnea, or dyspnea at patients normal baseline    Worsening cough? Yes, it's a dry cough.     Fever or chills? No    Headache: No    Sore throat: No    Chest pain: No    Diarrhea: No    Body aches? No    What treatments has patient tried?    Does patient live in a nursing home, group home, or shelter? No  Does patient have a way to get food/medications during quarantined? Yes, I have a friend or family member who can help me.    Took paxlovid last week    Problem list, Medication list, Allergies, and Medical/Social/Surgical histories reviewed in EPIC and updated as appropriate.   Additional history: as documented    ROS:  General and CV completed and negative except as noted above    Histories:   Patient Active Problem List   Diagnosis     Anxieties     Obesity, Class I, BMI 30-34.9     Health Care Home     Bariatric surgery status     History of diabetes mellitus     Nevus     Postsurgical malabsorption     Gastric bypass status for obesity     Family history of prostate cancer     Primary osteoarthritis involving multiple joints     Overweight (BMI 25.0-29.9)     Advanced directives, counseling/discussion     IGT (impair glucose tolerance)     GERD without esophagitis     Past Surgical History:   Procedure Laterality Date     APPENDECTOMY  1970     LAPAROSCOPIC BYPASS GASTRIC  11/18/2013    Procedure: LAPAROSCOPIC BYPASS GASTRIC;  LAPARSCOPIC MARITA-EN-Y GASTRIC BYPASS;  Surgeon: August Flores MD;  Location: [facility] OR     SALIVARY GLAND SURGERY       TONSILLECTOMY  "& ADENOIDECTOMY       VARICOCELECTOMY         Social History     Tobacco Use     Smoking status: Never Smoker     Smokeless tobacco: Never Used   Substance Use Topics     Alcohol use: Yes     Alcohol/week: 0.0 - 1.0 standard drinks     Comment: 1 drink per week     Family History   Problem Relation Age of Onset     Diabetes Mother      Prostate Cancer Father      Cardiovascular Father         s/p valve replacement     C.A.D. Father         CAB     Coronary Artery Disease Father      Unknown/Adopted Son            OBJECTIVE:                                                    /62   Pulse 60   Temp 98.4  F (36.9  C) (Temporal)   Resp 16   Ht 1.867 m (6' 1.5\")   Wt 110 kg (242 lb 9.6 oz)   SpO2 95%   BMI 31.57 kg/m    Body mass index is 31.57 kg/m .   APPEARANCE: = Relaxed and in no distress  Conj/Eyelids = noninjected and lids and lashes are without inflammation  PERRLA/Irises = Pupils Round Reactive to Light and Irisis without inflammation  Ears/Nose = External structures and Nares have usual shape and form  Ear canals and TM's = Canals are not inflammed and have none or little wax and the drums are not injected and have a light reflex   Lips/Teeth/Gums = No lesions seen, good dentition, and gums seem healthy  Oropharynx = No leukoplakia, No injection to the tissues, Normal Uvula  Neck = No anterior or posterior adenopathy appreciated.  Resp effort = Calm regular breathing  Breath Sounds = Good air movement with no rales or rhonchi in any lung fields  Mood/Affect = Cooperative and interested     ASSESSMENT/PLAN:                                                        There are no diagnoses linked to this encounter.    See Patient Instructions  There are no Patient Instructions on file for this visit.    The following health maintenance items are reviewed in Epic and correct as of today:  Health Maintenance   Topic Date Due     HIV SCREENING  Never done     PREVENTIVE CARE VISIT  08/30/2022     INFLUENZA " VACCINE (1) 09/01/2022     ADVANCE CARE PLANNING  07/23/2023     COLORECTAL CANCER SCREENING  11/14/2023     LIPID  08/30/2026     DTAP/TDAP/TD IMMUNIZATION (3 - Td or Tdap) 07/23/2028     HEPATITIS C SCREENING  Completed     PHQ-2 (once per calendar year)  Completed     ZOSTER IMMUNIZATION  Completed     COVID-19 Vaccine  Completed     Pneumococcal Vaccine: Pediatrics (0 to 5 Years) and At-Risk Patients (6 to 64 Years)  Aged Out     IPV IMMUNIZATION  Aged Out     MENINGITIS IMMUNIZATION  Aged Out     HEPATITIS B IMMUNIZATION  Aged Out       Cuauhtemoc Arredondo MD  [facility]  Family Practice  [facility]  [phone]    For any issues my office # is [phone]

## 2022-08-26 ASSESSMENT — ENCOUNTER SYMPTOMS
HEARTBURN: 0
FREQUENCY: 0
PARESTHESIAS: 0
PALPITATIONS: 0
ABDOMINAL PAIN: 0
HEMATURIA: 0
JOINT SWELLING: 0
MYALGIAS: 0
CHILLS: 0
SHORTNESS OF BREATH: 0
HEADACHES: 0
WEAKNESS: 0
DIARRHEA: 0
DYSURIA: 0
NERVOUS/ANXIOUS: 0
CONSTIPATION: 0
ARTHRALGIAS: 0
FEVER: 0
COUGH: 1
HEMATOCHEZIA: 0
NAUSEA: 0
EYE PAIN: 0
SORE THROAT: 0
DIZZINESS: 0

## 2022-09-02 ENCOUNTER — OFFICE VISIT (OUTPATIENT)
Dept: FAMILY MEDICINE | Facility: CLINIC | Age: 62
End: 2022-09-02

## 2022-09-02 VITALS
HEIGHT: 73 IN | BODY MASS INDEX: 31.3 KG/M2 | DIASTOLIC BLOOD PRESSURE: 74 MMHG | OXYGEN SATURATION: 97 % | SYSTOLIC BLOOD PRESSURE: 114 MMHG | WEIGHT: 236.2 LBS | HEART RATE: 65 BPM

## 2022-09-02 DIAGNOSIS — Z00.00 ROUTINE GENERAL MEDICAL EXAMINATION AT A HEALTH CARE FACILITY: Primary | ICD-10-CM

## 2022-09-02 DIAGNOSIS — R73.03 PREDIABETES: ICD-10-CM

## 2022-09-02 DIAGNOSIS — Z23 NEED FOR INFLUENZA VACCINATION: ICD-10-CM

## 2022-09-02 DIAGNOSIS — Z80.42 FAMILY HISTORY OF PROSTATE CANCER: ICD-10-CM

## 2022-09-02 DIAGNOSIS — Z13.6 SCREENING FOR CARDIOVASCULAR CONDITION: ICD-10-CM

## 2022-09-02 LAB
% GRANULOCYTES: 70.1 % (ref 42.2–75.2)
CHOL/HDL RATIO (RMG): 4 MG/DL (ref 0–4.5)
CHOLESTEROL: 173 MG/DL (ref 100–199)
HCT VFR BLD AUTO: 43.6 % (ref 39–51)
HDL (RMG): 43 MG/DL (ref 40–?)
HEMOGLOBIN: 14.9 G/DL (ref 13.4–17.5)
LDL CALCULATED (RMG): 107 MG/DL (ref 0–130)
LYMPHOCYTES NFR BLD AUTO: 23.5 % (ref 20.5–51.1)
MCH RBC QN AUTO: 29.2 PG (ref 27–31)
MCHC RBC AUTO-ENTMCNC: 34.1 G/DL (ref 33–37)
MCV RBC AUTO: 85.5 FL (ref 80–100)
MONOCYTES NFR BLD AUTO: 6.4 % (ref 1.7–9.3)
PLATELET # BLD AUTO: 176 K/UL (ref 140–450)
RBC # BLD AUTO: 5.1 X10/CMM (ref 4.2–5.9)
TRIGLYCERIDES (RMG): 113 MG/DL (ref 0–149)
WBC # BLD AUTO: 4.5 X10/CMM (ref 3.8–11)

## 2022-09-02 PROCEDURE — 99396 PREV VISIT EST AGE 40-64: CPT | Mod: 25 | Performed by: FAMILY MEDICINE

## 2022-09-02 PROCEDURE — 80061 LIPID PANEL: CPT | Mod: QW | Performed by: FAMILY MEDICINE

## 2022-09-02 PROCEDURE — 36415 COLL VENOUS BLD VENIPUNCTURE: CPT | Performed by: FAMILY MEDICINE

## 2022-09-02 PROCEDURE — 90674 CCIIV4 VAC NO PRSV 0.5 ML IM: CPT | Performed by: FAMILY MEDICINE

## 2022-09-02 PROCEDURE — 85025 COMPLETE CBC W/AUTO DIFF WBC: CPT | Performed by: FAMILY MEDICINE

## 2022-09-02 PROCEDURE — 90471 IMMUNIZATION ADMIN: CPT | Mod: 59 | Performed by: FAMILY MEDICINE

## 2022-09-02 NOTE — PROGRESS NOTES
SUBJECTIVE:   CC: Ethan Weiss is an 61 year old male who presents for preventive health visit.     Patient has been advised of split billing requirements and indicates understanding: Yes  Healthy Habits:  Answers for HPI/ROS submitted by the patient on 8/26/2022  Frequency of exercise:: 4-5 days/week  Getting at least 3 servings of Calcium per day:: Yes  Diet:: Regular (no restrictions)  Taking medications regularly:: Yes  Medication side effects:: None  Bi-annual eye exam:: NO  Dental care twice a year:: Yes  Sleep apnea or symptoms of sleep apnea:: None  abdominal pain: No  Blood in stool: No  Blood in urine: No  chest pain: No  chills: No  congestion: No  constipation: No  cough: Yes  diarrhea: No  dizziness: No  ear pain: No  eye pain: No  nervous/anxious: No  fever: No  frequency: No  genital sores: No  headaches: No  hearing loss: No  heartburn: No  arthralgias: No  joint swelling: No  peripheral edema: No  mood changes: No  myalgias: No  nausea: No  dysuria: No  palpitations: No  Skin sensation changes: No  sore throat: No  urgency: No  rash: No  shortness of breath: No  visual disturbance: No  weakness: No  impotence: Yes  penile discharge: No  Additional concerns today:: No  Duration of exercise:: 45-60 minutes    HEARING FREQUENCY:     Right Ear:     500 Hz : Pass   1000 Hz: Pass   2000 Hz: Pass   4000 Hz: Fail  Left Ear:     500 Hz :  Pass   1000 Hz: Pass   2000 Hz: Pass   4000 Hz: Fail    Lucinda Lozada [facility]      Today's PHQ-2 Score:   PHQ-2 ( 1999 [employer]) 8/26/2022 7/24/2022   Q1: Little interest or pleasure in doing things 0 0   Q2: Feeling down, depressed or hopeless 0 0   PHQ-2 Score 0 0   PHQ-2 Total Score (12-17 Years)- Positive if 3 or more points; Administer PHQ-A if positive - -   Q1: Little interest or pleasure in doing things Not at all Not at all   Q2: Feeling down, depressed or hopeless Not at all Not at all   PHQ-2 Score 0 0     Abuse: Current or Past(Physical, Sexual or  Emotional)- No  Do you feel safe in your environment? Yes    Social History     Tobacco Use     Smoking status: Never Smoker     Smokeless tobacco: Never Used   Substance Use Topics     Alcohol use: Yes     Alcohol/week: 0.0 - 1.0 standard drinks     Comment: 1 drink per week     If you drink alcohol do you typically have >3 drinks per day or >7 drinks per week? No                      Last PSA: No results found for: PSA    Reviewed orders with patient. Reviewed health maintenance and updated orders accordingly - Yes  Lab work is in process    Reviewed and updated as needed this visit by clinical staff   Tobacco  Allergies  Meds                Reviewed and updated as needed this visit by Provider                   Past Medical History:   Diagnosis Date     Atopic eczema      Cluster headache     rare in 2016     Family history of prostate cancer     dad     Gastric bypass status for obesity      Health Care Home      History of anxiety     2016 resolved     Hyperlipidaemia LDL goal < 100      IGT (impair glucose tolerance)     history of     Obesity     s/p gastric bypass       Past Surgical History:   Procedure Laterality Date     APPENDECTOMY  1970     LAPAROSCOPIC BYPASS GASTRIC  11/18/2013    Procedure: LAPAROSCOPIC BYPASS GASTRIC;  LAPARSCOPIC MARITA-EN-Y GASTRIC BYPASS;  Surgeon: August Flores MD;  Location: [facility] OR     SALIVARY GLAND SURGERY       TONSILLECTOMY & ADENOIDECTOMY       VARICOCELECTOMY         ROS:  CONSTITUTIONAL: NEGATIVE for fever, chills, change in weight  INTEGUMENTARY/SKIN: NEGATIVE for worrisome rashes, moles or lesions  EYES: NEGATIVE for vision changes or irritation  ENT: NEGATIVE for ear, mouth and throat problems  RESP: NEGATIVE for significant cough or SOB  CV: NEGATIVE for chest pain, palpitations or peripheral edema  GI: NEGATIVE for nausea, abdominal pain, heartburn, or change in bowel habits   male: negative for dysuria, hematuria, decreased urinary stream, erectile  "dysfunction, urethral discharge  MUSCULOSKELETAL: NEGATIVE for significant arthralgias or myalgia  NEURO: NEGATIVE for weakness, dizziness or paresthesias  PSYCHIATRIC: NEGATIVE for changes in mood or affect    OBJECTIVE:   /74   Pulse 65   Ht 1.861 m (6' 1.25\")   Wt 107.1 kg (236 lb 3.2 oz)   SpO2 97%   BMI 30.95 kg/m    EXAM:  GENERAL: healthy, alert and no distress  EYES: Eyes grossly normal to inspection, PERRL and conjunctivae and sclerae normal  HENT: ear canals and TM's normal, nose and mouth without ulcers or lesions  NECK: no adenopathy, no asymmetry, masses, or scars and thyroid normal to palpation  RESP: lungs clear to auscultation - no rales, rhonchi or wheezes  CV: regular rate and rhythm, normal S1 S2, no S3 or S4, no murmur, click or rub, no peripheral edema and peripheral pulses strong  ABDOMEN: soft, nontender, no hepatosplenomegaly, no masses and bowel sounds normal   (male): normal male genitalia without lesions or urethral discharge, no hernia  RECTAL: normal sphincter tone, no rectal masses, prostate normal size, smooth, nontender without nodules or masses  MS: no gross musculoskeletal defects noted, no edema  SKIN: no suspicious lesions or rashes  NEURO: Normal strength and tone, mentation intact and speech normal  PSYCH: mentation appears normal, affect normal/bright    Diagnostic Test Results:  Labs reviewed in Epic    ASSESSMENT/PLAN:   Ethan was seen today for physical and hearing screening.    Diagnoses and all orders for this visit:    Routine general medical examination at a health care facility  -     CBC with Diff/Plt ([facility])    Need for influenza vaccination  -     VACCINE ADMINISTRATION, INITIAL    Family history of prostate cancer  -     PSA Serum ([facility])    Prediabetes  -     Comp. Metabolic Panel (14) ([facility])  -     CBC with Diff/Plt ([facility])  -     Hemoglobin A1C ([facility])    Screening for cardiovascular condition  -     Lipid Profile ([facility])  -     Comp. Metabolic " "Panel (14) ([facility])    Other orders  -     WI CCIIV4 VACCINE PRESERVATIVE FREE 0.5 ML IM USE        Patient has been advised of split billing requirements and indicates understanding: Yes  COUNSELING:  Reviewed preventive health counseling, as reflected in patient instructions       Regular exercise       Healthy diet/nutrition    Estimated body mass index is 30.95 kg/m  as calculated from the following:    Height as of this encounter: 1.861 m (6' 1.25\").    Weight as of this encounter: 107.1 kg (236 lb 3.2 oz).        He reports that he has never smoked. He has never used smokeless tobacco.      Counseling Resources:  ATP IV Guidelines  Pooled Cohorts Equation Calculator  FRAX Risk Assessment  ICSI Preventive Guidelines  Dietary Guidelines for Americans, 2010  USDA's MyPlate  ASA Prophylaxis  Lung CA Screening    Cuauhtemoc Arredondo MD  [facility]  "

## 2022-09-03 LAB
ALBUMIN SERPL-MCNC: 4.6 G/DL (ref 3.8–4.8)
ALBUMIN/GLOB SERPL: 2 {RATIO} (ref 1.2–2.2)
ALP SERPL-CCNC: 80 IU/L (ref 44–121)
ALT SERPL-CCNC: 21 IU/L (ref 0–44)
AST SERPL-CCNC: 9 IU/L (ref 0–40)
BILIRUB SERPL-MCNC: 0.6 MG/DL (ref 0–1.2)
BUN SERPL-MCNC: 16 MG/DL (ref 8–27)
BUN/CREATININE RATIO: 21 (ref 10–24)
CALCIUM SERPL-MCNC: 8.9 MG/DL (ref 8.6–10.2)
CHLORIDE SERPLBLD-SCNC: 104 MMOL/L (ref 96–106)
CREAT SERPL-MCNC: 0.78 MG/DL (ref 0.76–1.27)
EGFR: 101 ML/MIN/1.73
GLOBULIN, TOTAL: 2.3 G/DL (ref 1.5–4.5)
GLUCOSE SERPL-MCNC: 92 MG/DL (ref 65–99)
HBA1C MFR BLD: 5.5 % (ref 4.8–5.6)
POTASSIUM SERPL-SCNC: 4.3 MMOL/L (ref 3.5–5.2)
PROT SERPL-MCNC: 6.9 G/DL (ref 6–8.5)
PSA NG/ML: 4.5 NG/ML (ref 0–4)
SODIUM SERPL-SCNC: 144 MMOL/L (ref 134–144)
TOTAL CO2: 26 MMOL/L (ref 20–29)

## 2022-09-07 DIAGNOSIS — R05.9 COUGH: ICD-10-CM

## 2022-09-07 DIAGNOSIS — K21.00 GASTROESOPHAGEAL REFLUX DISEASE WITH ESOPHAGITIS WITHOUT HEMORRHAGE: ICD-10-CM

## 2022-09-14 DIAGNOSIS — R97.20 ELEVATED PROSTATE SPECIFIC ANTIGEN (PSA): Primary | ICD-10-CM

## 2022-09-14 NOTE — RESULT ENCOUNTER NOTE
Dear Ethan,     I am writing to report that your included test results are as expected.      Slight elevation in your prostate test the psa that we should be sure to recheck in 6 months.    Many labs contain some results that are slightly outside of the normal range, I have reviewed any of these results and they require no changes at this time.    Cuauhtemoc Arredondo MD

## 2022-10-13 ENCOUNTER — IMMUNIZATION (OUTPATIENT)
Dept: NURSING | Facility: CLINIC | Age: 62
End: 2022-10-13
Payer: COMMERCIAL

## 2022-10-13 PROCEDURE — 91312 COVID-19,PF,PFIZER BOOSTER BIVALENT: CPT

## 2022-10-13 PROCEDURE — 0124A COVID-19,PF,PFIZER BOOSTER BIVALENT: CPT

## 2022-11-22 NOTE — PATIENT INSTRUCTIONS
"Leroy Long!      It was great chatting with you again today! Here's a summary of the goals we discussed:    1. Take 20-30 minutes to eat each meal  - This allows time for your stomach to \"tell\" your brain that you're full    2. Separate fluids and meals by 30 minutes  - This helps avoid stretching your pouch, as well as avoid flushing food through too quickly, leaving you hungry again within a couple hours    3. Vitamins/minerals:  - Reduce your Calcium to 400-500mg 3x/day OR 600mg 2x/day  - If interested in switching to a bariatric multivitamin, here are some options. If given the choice between an 18mg and 45mg iron content, go with the 18mg option.    BA Ultra Solo  Unjury Opurity  Bariatric Pal - One a Day  ProCare    Specialty Regimen:  Multivitamin: once per day  Calcium: 400-500mg 3x/day OR 600mg 2x/day. Take at least 2 hours apart from your multivitamin.  Vitamin B12 (sublingual): 500mcg daily or 5000mcg weekly    **You may discontinue taking additional Vitamin B1 and Vitamin D.    4. Ongoing bariatric basics:  - Eat slowly and chew well  - Separate fluids and meals  - Limit snacks to milk or protein drinks  - Aim for 1 cup of food per meal  - Avoid caffeine and carbonation  - Consume a minimum of 60g protein each day            Plan on following up in 12 months. This can be scheduled via our call center at . Reach out sooner with any questions or concerns. Have a great day!      Sammie Carpio, CARISSA, LD?  Clinical Dietitian       "

## 2022-11-22 NOTE — PROGRESS NOTES
"Ethan is a 62 year old who is being evaluated via a billable video visit.      How would you like to obtain your AVS? MyChart  If the video visit is dropped, the invitation should be resent by: Text to cell phone: [phone]  Will anyone else be joining your video visit? No            Video-Visit Details    Video Start Time: 9:30am    Type of service:  Video Visit    Video End Time:9:49am    Originating Location (pt. Location): Home        Distant Location (provider location):  On-site    Platform used for Video Visit: [facility]      NUTRITION POST OP APPOINTMENT  DATE OF VISIT: November 22, 2022    Ethan Weiss  1960  male  [id number]  62 year old     ASSESSMENT:    REASON FOR VISIT:  Ethan is a 62 year old year old male presents today for 9 year PO nutrition follow-up appointment. Patient is accompanied by self.    DIAGNOSIS:  Status post gastric bypass surgery.  Obesity Obesity Grade I BMI 30-34.9     ANTHROPOMETRICS:  Initial Weight: 318 lbs    Height: 6' 2\"   Current Weight: 237 lbs 0 oz     BMI: Body mass index is 30.43 kg/m .    VITAMINS AND MINERALS:  2 Multivitamin with Minerals (AM)  950 mg Calcium With Vitamin D (TID; lunch, dinner, bedtime)   2000 International units Vitamin D  500 mcg Vitamin B-12 sublingual (AM)  12mg Vitamin B1     Recommended:  Reduce Calcium to 400-500mg TID or 600mg BID  Pt interested in specialty MVT - discussed options      NUTRITION HISTORY:  Breakfast: protein shake latte (decaf coffee, Fair Life milk, protein shake)  oatmeal   Lunch: 2 small slices or 1 large slice of toast w/butter or peanut butter   Supper: chicken noodle soup  chicken + vegetables   Snacks: Cj Pop (afternoon and evenings), Twizzlers  Fluids consumed: Water, protein drinks, tea (green), milk (Fair Life), decaf coffee  Consuming liquid calories: Yes  Protein intake: 60-90 grams/day  Tolerate regular texture food: Yes  Portion size: 1 cup or more  Take 20-30 minutes to consume each meal: No "   Eat protein foods first: Yes  Fluids and meals separate by at least 30 minutes: No  Chew foods thoroughly: Yes  Tolerating diet: Yes  Drinking high protein supplements: Yes  Consuming snacks per day: 2-3  Additional Information: Pt feeling well and tolerating diet. He is aware of changes he could make to reduce weight but is fairly comfortable where he's at. We reviewed rationale for eating slowly and  fluids from meals. Discussed risks of caffeine r/t anemia, reflux and gastric ulcers; he will work on phasing out sources of caffeine.         PHYSICAL ACTIVITY:  Type: walking, yoga/stretching, running in the summer, rowing machine in the winter  Frequency (days per week): 5  Duration (min): 60-90     DIAGNOSIS:  Previous Nutrition Diagnosis: Altered gastrointestinal function related to alteration in gastrointestinal structure as evidenced by history of gastric bypass surgery.- no change    Previous goals:  Consistently eat 15-20 grams of protein at lunch (will send Protein Sources for Weight Loss and Keeping Up Your Diet after Weight Loss Surgery via [employer])/overall protein goal= grams (1-1.5 g/ kg IBW) per day - not met  Replace Matcha tea with decaf/ herbal tea or decaf skim latte - met  Replace snacks with a second protein drink (discussed other options for protein drinks) - not met    Current Nutrition Diagnosis: Altered gastrointestinal function related to alteration in gastrointestinal structure as evidenced by history of gastric bypass surgery.    INTERVENTION:   Nutrition Prescription: Eat 3 meals a day at regular intervals. Consume 60-90 grams of protein daily. Follow post-surgical vitamin and mineral protocol.  Assessed learning needs and learning preferences.    GOALS:  Separate fluids and meals by 30 minutes  Take 20-30 minutes to eat each meal  Take all vitamins/minerals per guidelines (see above)      Follow-Up:   Recommend annual follow up visits to assist with lifestyle changes or  per insurance.  Implementation: Discussed progress toward previous goals; reinforced importance of following bariatric lifestyle changes.    NUTRITION MONITORING AND EVALUATION:  Anticipated compliance: fair-good  Verbalized good understanding.    Follow up: Patient to follow up in 12 months.    TIME SPENT WITH PATIENT:  19 minutes        Sammie Carpio RD, LD  Clinical Dietitian

## 2022-11-23 ENCOUNTER — VIRTUAL VISIT (OUTPATIENT)
Dept: SURGERY | Facility: CLINIC | Age: 62
End: 2022-11-23
Payer: COMMERCIAL

## 2022-11-23 VITALS — WEIGHT: 237 LBS | HEIGHT: 74 IN | BODY MASS INDEX: 30.42 KG/M2

## 2022-11-23 VITALS — BODY MASS INDEX: 30.42 KG/M2 | HEIGHT: 74 IN | WEIGHT: 237 LBS

## 2022-11-23 DIAGNOSIS — K91.2 POSTSURGICAL MALABSORPTION: ICD-10-CM

## 2022-11-23 DIAGNOSIS — Z98.84 BARIATRIC SURGERY STATUS: ICD-10-CM

## 2022-11-23 DIAGNOSIS — E66.811 OBESITY, CLASS I, BMI 30-34.9: ICD-10-CM

## 2022-11-23 DIAGNOSIS — K21.9 GERD WITHOUT ESOPHAGITIS: ICD-10-CM

## 2022-11-23 DIAGNOSIS — Z98.84 GASTRIC BYPASS STATUS FOR OBESITY: ICD-10-CM

## 2022-11-23 DIAGNOSIS — E66.3 OVERWEIGHT (BMI 25.0-29.9): ICD-10-CM

## 2022-11-23 DIAGNOSIS — E66.811 OBESITY, CLASS I, BMI 30-34.9: Primary | ICD-10-CM

## 2022-11-23 PROCEDURE — 97803 MED NUTRITION INDIV SUBSEQ: CPT | Mod: 95 | Performed by: DIETITIAN, REGISTERED

## 2022-11-23 PROCEDURE — 99214 OFFICE O/P EST MOD 30 MIN: CPT | Mod: 95 | Performed by: PHYSICIAN ASSISTANT

## 2022-11-23 RX ORDER — BUPROPION HYDROCHLORIDE 100 MG/1
TABLET, EXTENDED RELEASE ORAL
Qty: 60 TABLET | Refills: 1 | Status: SHIPPED | OUTPATIENT
Start: 2022-11-23 | End: 2022-12-30

## 2022-11-23 NOTE — PROGRESS NOTES
Ethan is a 62 year old who is being evaluated via a billable video visit.      If the video visit is dropped, the invitation should be resent by: Text to cell phone: [phone]  Will anyone else be joining your video visit? No      Video-Visit Details    Type of service:  Video Visit    Video Start Time: 9:01    Video End Time: 9:24      35 minutes spent on the date of the encounter doing chart review, review of test results, patient visit and documentation       Originating Location (pt. Location): Home    Distant Location (provider location):  Home    Platform used for Video Visit: Home            VIRTUAL BARIATRIC FOLLOW UP            November 23, 2022        HISTORY OF PRESENT ILLNESS: Pt presents today for his follow-up appointment status post laparoscopic gastric bypass. Overall doing well.  Is down a bit in weight since last year.  Does a lot of walking for exercise. Some days for about 2 hours.  No particular concerns today.       237 lbs 0 oz    Wt Readings from Last 4 Encounters:   11/23/22 237 lb (107.5 kg)   09/02/22 236 lb 3.2 oz (107.1 kg)   08/02/22 242 lb 9.6 oz (110 kg)   11/23/21 239 lb (108.4 kg)         BARIATRIC METRICS:  Current Weight: 237 lb (107.5 kg) (Pt reported)  Body mass index is 30.43 kg/m .   Wt change since last visit (lbs): -2  Cumulative weight loss (lbs): 81        Patient is taking the following bariatric postoperative vitamins:  2 Complete multivitamins with minerals (at different times than calcium)   2000 International Units of Vitamin D daily  1200 mg of Calcium daily in divided doses  500 mcg of Vitamin B12 sl 5 days weekly  1 B Complex daily      Pt is exercising by walking 30 everyday.  Another 3-4 days walks for an additional 1.5 hours.  Also does some yoga in the mornings       OBESITY RELATED CONDITIONS:  High cholesterol - resolved  High Blood pressure - resolved  GERD - was restarted on medication after getting sick with covid. Had a cough that got better with meds.  "Thought related to acid  Diabetes Mellitus II - resolved          SOCIAL HISTORY:  Pt denies smoking.  Pt drinks very minimal alcohol use.  Avoids NSAIDS.  Has a cup of green tea every morning      REVIEW OF SYSTEMS:     GI:  Nausea- No  Vomiting- No  Diarrhea- No  Constipation- No  Dysphagia- No  Abdominal Pain- No  Heartburn- Continues with omeprazole.  Was given to him for cough by primary.      SKIN:  Intertriginous irritation- No       PSYCH:  Depression- No  Anxiety- No      LABS/IMAGING/MEDICAL RECORDS REVIEW:   Vitamin D, Total (25-Hydroxy)   Date Value Ref Range Status   11/26/2021 54 20 - 75 ug/L Final     Parathyroid Hormone Intact   Date Value Ref Range Status   11/26/2021 46 18 - 80 pg/mL Final     Vitamin B12   Date Value Ref Range Status   11/26/2021 794 193 - 986 pg/mL Final     Hemoglobin   Date Value Ref Range Status   09/02/2022 14.9 13.4 - 17.5 g/dl Final     Ferritin   Date Value Ref Range Status   11/26/2021 40 26 - 388 ng/mL Final     Iron   Date Value Ref Range Status   11/26/2021 92 35 - 180 ug/dL Final     Iron Binding Capacity   Date Value Ref Range Status   11/26/2021 337 240 - 430 ug/dL Final     Iron Saturation Index   Date Value Ref Range Status   11/18/2020 35 15 - 46 % Final   11/20/2019 36 15 - 46 % Final     Iron Sat Index   Date Value Ref Range Status   11/26/2021 27 15 - 46 % Final       PHYSICAL EXAMINATION:  Ht 6' 2\" (1.88 m)   Wt 237 lb (107.5 kg)   BMI 30.43 kg/m    GENERAL: Healthy, alert and no distress  EYES: Eyes grossly normal to inspection.  No discharge or erythema, or obvious scleral/conjunctival abnormalities.  RESP: No audible wheeze, cough, or visible cyanosis.  No visible retractions or increased work of breathing.    SKIN: Visible skin clear. No significant rash, abnormal pigmentation or lesions.  NEURO: Cranial nerves grossly intact.  Mentation and speech appropriate for age.  PSYCH: Mentation appears normal, affect normal/bright, judgement and insight intact, " "normal speech and appearance well-groomed.          ASSESSMENT AND PLAN:       9 years SP Gastric Bypass    Class 2 Obesity due to excess calories with Body Mass Index (BMI) of 30   Congratulated patient on weight loss.      We discussed the role of pharmacological agents in the treatment of obesity and the \"off-label\" use of medications in this practice. We discussed the risks and benefits of each. We discussed indications, contraindications, potential side effects, and estimated costs of each. Discussed that medications must always be used together with lifestyle changes such as improvements in diet choices, portion control and establishing and maintaining a regular exercise program.      Patient has tried topamax - does not care to go back on it.  Discussed Saxenda - Not currently interested in daily injections.  Will try bupropion. No glaucoma, No heart issues, No HTN, No seizures. Reviewed side effects and patient information provided    Postsurgical Malabsorption   Labs ordered per protocol    GERD without esophagitis   Can do a trial off of omeprazole. If symptoms return can start again. Discussed reducing caffeine intake as well      Follow up:  Will schedule with pharmacist for January and provider in April      Jann Arteaga MS, PA-C          "

## 2022-11-23 NOTE — PATIENT INSTRUCTIONS
"Nice to talk with you today. Thank you for allowing me the privilege of caring for you. We hope we provided you with the excellent service you deserve.     To ensure the quality of our services you may receive a patient satisfaction survey from an independent monitoring company.  The greatest compliment you can give is \"Likely to Recommend\"    Below is our plan we discussed.-  MARGO Forte      You should be receiving a call to schedule with a Pharmacist in January . You may also schedule the appointment by calling [phone] or toll-free at [phone].    Please schedule a follow up with Jann Arteaga PA-C for early April.  If you need to reach me sooner you can do so by calling [phone].    Have a great day!         Bupropion (By mouth)  Brand Name(s):,Wellbutrin,Wellbutrin SR,Wellbutrin XL  .  When This Medicine Should Not Be Used:  This medicine is not right for everyone. Do not use it if you had an allergic reaction to bupropion, or if you have seizures, anorexia, or bulimia.  How to Use This Medicine:  Take your medicine as directed. Your dose may need to be changed several times to find what works best for you.  Swallow the extended-release tablet whole. Do not crush, break, or chew it.    Do not take Wellbutrin  close to bedtime if you have trouble sleeping.  Take it with food if it upsets your stomach or if you have nausea.  If you take the extended-release tablet, part of the tablet may pass into your stools. This is normal and is nothing to worry about.  Missed dose: Skip the missed dose and go back to your regular dosing schedule. Never take extra medicine to make up for a missed dose.  Store the medicine in a closed container at room temperature, away from heat, moisture, and direct light.  Drugs and Foods to Avoid:  Ask your doctor or pharmacist before using any other medicine, including over-the-counter medicines, vitamins, and herbal products.  Do not use this medicine and an MAO inhibitor " (MAOI) within 14 days of each other.Tell your doctor if you take barbiturates, benzodiazepines, antiseizure medicine, or sedatives, or if you recently stopped taking them.  Some medicines can affect how bupropion works. Tell your doctor if you use any of the following:   Amantadine, carbamazepine, cimetidine, clopidogrel, cyclophosphamide, digoxin, efavirenz, levodopa, lopinavir, nelfinavir, nicotine patch, orphenadrine, phenobarbital, phenytoin, ritonavir, tamoxifen, theophylline, thiotepa, ticlopidine  Beta blocker medicine (including metoprolol)  A blood thinner (including warfarin)  Insulin or diabetes medicine  Medicine to treat depression (including desipramine, fluoxetine, imipramine, nortriptyline, paroxetine, sertraline, venlafaxine)  Medicine to treat heart rhythm problems (including flecainide, propafenone)  Medicine to treat mental illness (including haloperidol, risperidone, thioridazine)  Steroid medicine (including dexamethasone, hydrocortisone, methylprednisolone, prednisolone, prednisone)  Do not drink alcohol while you are using this medicine.  Tell your doctor if you use anything else that makes you sleepy. Some examples are allergy medicine, narcotic pain medicine, and alcohol.  Warnings While Using This Medicine:  Tell your doctor if you are pregnant or breastfeeding, or if you have kidney disease, liver disease, heart disease, diabetes, glaucoma, mental illness (including bipolar disorder), or high blood pressure. Tell your doctor if you have a history of drug addiction or if you drink alcohol.  For some children, teenagers, and young adults, this medicine may increase mental or emotional problems. This may lead to thoughts of suicide and violence. Talk with your doctor right away if you have any thoughts or behavior changes that concern you. Tell your doctor if you or anyone in your family has a history of bipolar disorder or suicide attempts.  This medicine may cause the following  problems:  Increased risk of seizures  Changes in mood or behavior  High blood pressure  Serious skin reactions  This medicine may make you dizzy or drowsy. Do not drive or do anything that could be dangerous until you know how this medicine affects you.  Do not stop using this medicine suddenly. Your doctor will need to slowly decrease your dose before you stop it completely.  Tell any doctor or dentist who treats you that you are using this medicine. This medicine may affect certain medical test results.  Your doctor will check your progress and the effects of this medicine at regular visits. Keep all appointments.  Keep all medicine out of the reach of children. Never share your medicine with anyone.  Possible Side Effects While Using This Medicine:  Call your doctor right away if you notice any of these side effects:  Allergic reaction: Itching or hives, swelling in your face or hands, swelling or tingling in your mouth or throat, chest tightness, trouble breathing  Blistering, peeling, red skin rash  Chest pain, trouble breathing, fast, slow, or uneven heartbeat  Eye pain, vision changes, seeing halos around lights  Muscle or joint pain, fever with rash  Seeing or hearing things that are not there, feeling like people are against you  Seizures  Sudden increase in energy, racing thoughts, trouble sleeping  Thoughts of hurting yourself, worsening depression, severe agitation or confusion  If you notice these less serious side effects, talk with your doctor:  Dry mouth  Headache, dizziness  Nausea, vomiting, constipation, diarrhea, gas, stomach pain  Weight gain or loss  If you notice other side effects that you think are caused by this medicine, tell your doctor.  Call your doctor for medical advice about side effects. You may report side effects to FDA at 5-008-FDA-5703    Copyright [employer] 2021 Generated on Wednesday, July 14, 2021 12:04:48 PM

## 2022-12-08 ENCOUNTER — LAB (OUTPATIENT)
Dept: LAB | Facility: CLINIC | Age: 62
End: 2022-12-08
Payer: COMMERCIAL

## 2022-12-08 DIAGNOSIS — K91.2 POSTSURGICAL MALABSORPTION: ICD-10-CM

## 2022-12-08 DIAGNOSIS — Z98.84 BARIATRIC SURGERY STATUS: ICD-10-CM

## 2022-12-08 LAB
DEPRECATED CALCIDIOL+CALCIFEROL SERPL-MC: 59 UG/L (ref 20–75)
FERRITIN SERPL-MCNC: 62 NG/ML (ref 31–409)
IRON BINDING CAPACITY (ROCHE): 308 UG/DL (ref 240–430)
IRON SATN MFR SERPL: 46 % (ref 15–46)
IRON SERPL-MCNC: 141 UG/DL (ref 61–157)
PTH-INTACT SERPL-MCNC: 35 PG/ML (ref 15–65)
VIT B12 SERPL-MCNC: 968 PG/ML (ref 232–1245)

## 2022-12-08 PROCEDURE — 83970 ASSAY OF PARATHORMONE: CPT

## 2022-12-08 PROCEDURE — 83540 ASSAY OF IRON: CPT

## 2022-12-08 PROCEDURE — 84590 ASSAY OF VITAMIN A: CPT | Mod: 90

## 2022-12-08 PROCEDURE — 82607 VITAMIN B-12: CPT

## 2022-12-08 PROCEDURE — 36415 COLL VENOUS BLD VENIPUNCTURE: CPT

## 2022-12-08 PROCEDURE — 99000 SPECIMEN HANDLING OFFICE-LAB: CPT

## 2022-12-08 PROCEDURE — 82306 VITAMIN D 25 HYDROXY: CPT

## 2022-12-08 PROCEDURE — 83550 IRON BINDING TEST: CPT

## 2022-12-08 PROCEDURE — 82728 ASSAY OF FERRITIN: CPT

## 2022-12-11 LAB
ANNOTATION COMMENT IMP: NORMAL
RETINYL PALMITATE SERPL-MCNC: 0.03 MG/L
VIT A SERPL-MCNC: 0.73 MG/L

## 2022-12-25 ENCOUNTER — MYC MEDICAL ADVICE (OUTPATIENT)
Dept: FAMILY MEDICINE | Facility: CLINIC | Age: 62
End: 2022-12-25

## 2022-12-25 DIAGNOSIS — N52.9 ED (ERECTILE DYSFUNCTION): Primary | ICD-10-CM

## 2022-12-25 DIAGNOSIS — Z76.0 ENCOUNTER FOR MEDICATION REFILL: ICD-10-CM

## 2022-12-27 RX ORDER — SILDENAFIL CITRATE 20 MG/1
TABLET ORAL
Qty: 30 TABLET | Refills: 3 | Status: SHIPPED | OUTPATIENT
Start: 2022-12-27 | End: 2024-03-04

## 2022-12-27 NOTE — TELEPHONE ENCOUNTER
Patient is requesting rx for sildenafil 20mg tabs.   Med was prescribed in 2019 by Dr. Arredondo but didn't fill due to cost.     Last visit: 9/2/22 cpx with Dr. Arredondo     Plan: routed rx request to Dr. Arredondo for review.  Fela Murphy RN

## 2022-12-30 ENCOUNTER — TELEPHONE (OUTPATIENT)
Dept: SURGERY | Facility: CLINIC | Age: 62
End: 2022-12-30

## 2022-12-30 ENCOUNTER — TELEPHONE (OUTPATIENT)
Dept: PHARMACY | Facility: CLINIC | Age: 62
End: 2022-12-30

## 2022-12-30 DIAGNOSIS — E66.811 OBESITY, CLASS I, BMI 30-34.9: ICD-10-CM

## 2022-12-30 RX ORDER — BUPROPION HYDROCHLORIDE 100 MG/1
200 TABLET, EXTENDED RELEASE ORAL DAILY
Qty: 180 TABLET | Refills: 0 | Status: SHIPPED | OUTPATIENT
Start: 2022-12-30 | End: 2023-07-31

## 2022-12-30 NOTE — TELEPHONE ENCOUNTER
LVM, sent mychart    Appt on 1/30/23 with Lauren Bloch needs to be rescheduled due to the provider being unavailable. Reschedule with Tosha, next available appt.

## 2022-12-30 NOTE — TELEPHONE ENCOUNTER
Clinical Pharmacist Note    Taking bupropion 100 mg SR 2 tabs every morning. Doing well. No side effects, and losing weight. OK to refill so that able to reschedule visit with Medication Therapy Management Pharmacist for Feb 2023 without running out of refills. Rx sent for 3 month supply and patient to check in in about 6 weeks with Medication Therapy Management Pharmacist.     As taking Revatio, Patient instructed to monitor blood pressure while taking bupropion and bring to visit with pharmacist.    Tracy Neely, RedD, MPH  Medication Therapy Management Pharmacist   [facility] (through 1/20/23)

## 2023-01-30 ENCOUNTER — MYC MEDICAL ADVICE (OUTPATIENT)
Dept: FAMILY MEDICINE | Facility: CLINIC | Age: 63
End: 2023-01-30

## 2023-01-30 DIAGNOSIS — N52.9 ED (ERECTILE DYSFUNCTION): Primary | ICD-10-CM

## 2023-01-31 NOTE — TELEPHONE ENCOUNTER
See [employer] message. Referral and PSA hx faxed to [facility] Dr. Connor. Patient given their contact info to schedule consult.  Fela Murphy RN

## 2023-02-15 ENCOUNTER — TELEPHONE (OUTPATIENT)
Dept: SURGERY | Facility: CLINIC | Age: 63
End: 2023-02-15
Payer: COMMERCIAL

## 2023-02-15 ENCOUNTER — VIRTUAL VISIT (OUTPATIENT)
Dept: PHARMACY | Facility: CLINIC | Age: 63
End: 2023-02-15
Attending: PHYSICIAN ASSISTANT
Payer: COMMERCIAL

## 2023-02-15 DIAGNOSIS — E66.811 OBESITY, CLASS I, BMI 30-34.9: Primary | ICD-10-CM

## 2023-02-15 DIAGNOSIS — R05.3 CHRONIC COUGH: ICD-10-CM

## 2023-02-15 DIAGNOSIS — Z98.84 GASTRIC BYPASS STATUS FOR OBESITY: ICD-10-CM

## 2023-02-15 DIAGNOSIS — K21.9 GERD WITHOUT ESOPHAGITIS: ICD-10-CM

## 2023-02-15 PROCEDURE — 99207 PR NO CHARGE LOS: CPT | Mod: VID | Performed by: PHARMACIST

## 2023-02-15 RX ORDER — CALCIUM CITRATE/VITAMIN D3 200MG-6.25
2 TABLET ORAL 2 TIMES DAILY
COMMUNITY

## 2023-02-15 RX ORDER — SEMAGLUTIDE 0.25 MG/.5ML
0.25 INJECTION, SOLUTION SUBCUTANEOUS WEEKLY
Qty: 2 ML | Refills: 0 | Status: SHIPPED | OUTPATIENT
Start: 2023-02-15 | End: 2023-03-15

## 2023-02-15 NOTE — PROGRESS NOTES
Medication Therapy Management (MTM) Encounter    ASSESSMENT:                            Medication Adherence/Access: No issues identified    History Renea-en-Y Gastric Bypass: Would benefit from stopping vitamin B12 as there is 1000 mcg B12 in current multivitamin which satisfies the ASMBS Guidelines micronutrient requirements of Vitamin B12 s/p Renea-en-Y.     Obesity: Needs improvement. Patient would benefit from alternative consideration of weight loss medication(s). Typically bupropion would be prescribed in combination with naltrexone to mimic Contrave for weight loss. Patient wishing to consider alternative such as GLP1 agonist Wegovy. Therefore, would benefit from change from bupropion to Wegovy. Completed Wegovy pen teaching today.     Chronic Cough/GERD: Stable.     PLAN:                            1. Stop Vitamin B12     2. Taper off Bupropion - decrease bupropion  mg daily in AM for 7 days then stop. Start Wegovy - will start Prior Authorization process.   If approved, Start Wegovy 0.25 mg weekly for 4 weeks then increase to 0.5 mg weekly thereafter.     Follow-up: Jann Arteaga PA-C in 6 weeks and Lauren Bloch, [facility] pharmacist in 3 months. Call [phone] to schedule.     SUBJECTIVE/OBJECTIVE:                          Ethan Weiss is a 62 year old male called for an initial visit. He was referred to me from Jann Arteaga PA-C.      Reason for visit: comprehensive review of medications.    Allergies/ADRs: Reviewed in chart  Past Medical History: Reviewed in chart  Tobacco: He reports that he has never smoked. He has never used smokeless tobacco.  Alcohol: Less than 1 beverages / week  Caffeine: 1 glass green tea per day    Medication Adherence/Access:   Patient takes medications directly from bottles. Used to do a pill box but doesn't do this any longer. Finds the pill box to be time consuming and annoying to fill.     History Renea-en-Y Gastric Bypass:   Bariatric Pal multivitamin 1 capsule once daily  (18 mg iron, 1000 mcg B12)   B12 sublingual 5000 mcg once weekly    Calcium-Vitamin D (Citracal petites) 2 tablets three times daily     Patient had gastric bypass completed in 2014.  He does not have complaints of acid reflux currently. Patient reports she drinks ~48 oz water daily. Hedoes not have issues of swallowing food/pills. History of reactive hypoglycemia since surgery? No. No NSAID use. Initial Consult Weight: 318 lb. Had experienced significant weight loss, but over the last couple months have experienced some weight regain to current weight.     Hemoglobin   Date Value Ref Range Status   09/02/2022 14.9 13.4 - 17.5 g/dl Final     Ferritin   Date Value Ref Range Status   12/08/2022 62 31 - 409 ng/mL Final   11/18/2020 46 26 - 388 ng/mL Final     Lab Results   Component Value Date    VITDT 59 12/08/2022      Lab Results   Component Value Date    PTHI 35 12/08/2022      Lab Results   Component Value Date    B12 968 12/08/2022      Lab Results   Component Value Date    MATEUSZ 0.73 12/08/2022     Obesity:   Bupropion  mg once daily     Followed by Jann Arteaga PA-C, seen 11/23/2022 for Re-Establish Post-Bariatric Consult . Patient is experiencing the follow side effects: constipation. Reports that at first he thought that the medication may have been effective for weight as started to see some minor weight loss (2-3 lb) but now has been at a weight plateau over the last 2 months. He also started checking blood pressure and finds that his blood pressure systolic has increased from 110-120s to 130s on bupropion. He has goal to get to 210 lb for weight loss. Interested in considering alternative weight loss medication(s) options. No hx pancreatitis or personal or family history of MTC or MEN2. Patient's wife on Wegovy and reporting significant weight loss.   Diet/Eating Habits: Patient reports a protein rich diet, drinking 48 oz to 64 oz water daily.  Eating 3 meals per day.   Exercise/Activity: Patient  "reports walking 30 minutes daily.   Tried/Failed/Contraindicated Medications: Topiramate: ineffective.     Wt Readings from Last 4 Encounters:   11/23/22 237 lb (107.5 kg)   11/23/22 237 lb (107.5 kg)   09/02/22 236 lb 3.2 oz (107.1 kg)   08/02/22 242 lb 9.6 oz (110 kg)     Estimated body mass index is 30.43 kg/m  as calculated from the following:    Height as of 11/23/22: 6' 2\" (1.88 m).    Weight as of 11/23/22: 237 lb (107.5 kg).    Chronic Cough/GERD:   Prilosec (omeprazole) 20 mg once daily until cold resolved.     Chronic cough after cold or flu historically- so will start this when sick and helps to relieve cough after sickness. Reports then will stop and doesn't have issues of cough after this. Then will restart when sick. The patient does not have a history of GI bleed. Patient feels that current regimen is effective when needed. Just getting over cold as of now.   ----------------      I spent 30 minutes with this patient today. All changes were made via collaborative practice agreement with Jann Arteaga PA-C. A copy of the visit note was provided to the patient's provider(s).    A summary of these recommendations was sent via [employer].    Lauren Bloch, PharmD, BCACP   Medication Therapy Management Pharmacist   [facility]    Telemedicine Visit Details  Type of service:  Telephone visit  Start Time: 5:00 PM  End Time: 5:30 PM     Medication Therapy Recommendations  Gastric bypass status for obesity    Current Medication: vitamin B-Complex (Discontinued)   Rationale: No medical indication at this time - Unnecessary medication therapy - Indication   Recommendation: Discontinue Medication   Status: Accepted - no CPA Needed         Obesity, Class I, BMI 30-34.9    Current Medication: buPROPion (WELLBUTRIN SR) 100 MG 12 hr tablet   Rationale: Condition refractory to medication - Ineffective medication - Effectiveness   Recommendation: Change Medication - Wegovy 0.25 MG/0.5ML " Soaj   Status: Accepted per CPA

## 2023-02-16 NOTE — TELEPHONE ENCOUNTER
"[facility] PA REQUEST    Prior Authorization Retail Medication Request    Medication/Dose: Wegovy  ICD code (if different than what is on RX):  Same as on RX  Previously Tried and Failed:  Topiramate: side effects; Bupropion: ineffective; has worked on diet and exercise for at least 6 months .  Rationale:  Weight loss medication(s) are indicated due to patient having a BMI of at least 30 kg/m2, therefore Wegovy would be an appropriate consideration for management of patient's obesity. Currently elevated systolic blood pressure therefore phentermine may not be appropriate.     Estimated body mass index is 30.43 kg/m  as calculated from the following:    Height as of 11/23/22: 6' 2\" (1.88 m).    Weight as of 11/23/22: 237 lb (107.5 kg).    Insurance Name:  [facility]     Pharmacy Information (if different than what is on RX)  Name:  [facility]      Lauren Bloch, PharmD, BCACP   Medication Therapy Management Pharmacist   [facility]            "

## 2023-02-16 NOTE — PATIENT INSTRUCTIONS
"Recommendations from today's MTM visit:                                                    MTM (medication therapy management) is a service provided by a clinical pharmacist designed to help you get the most of out of your medicines.   Today we reviewed what your medicines are for, how to know if they are working, that your medicines are safe and how to make your medicine regimen as easy as possible.        1. Stop Vitamin B12     2. Taper off Bupropion - decrease bupropion  mg daily in AM for 7 days then stop. Start Wegovy - will start Prior Authorization process.   If approved, Start Wegovy 0.25 mg weekly for 4 weeks then increase to 0.5 mg weekly thereafter.     Follow-up: Jann Arteaga PA-C in 6 weeks and Lauren Bloch, MTM pharmacist in 3 months. Call [phone] to schedule.     It was great speaking with you today.  I value your experience and would be very thankful for your time in providing feedback in our clinic survey. In the next few days, you may receive an email or text message from [employer] with a link to a survey related to your  clinical pharmacist.\"     To schedule another appointment with your MTM pharmacist, please call [facility] Scheduling at [phone].     My Clinical Pharmacist's contact information:                                                      Please feel free to contact me with any questions or concerns you have.      Lauren Bloch, PharmD  Medication Therapy Management Pharmacist   [facility]    WEGOVY (semaglutide)    What is Wegovy?    Wegovy (semaglutide) injection 2.4 mg is an injectable prescription medicine used for adults with obesity (BMI ?30) or overweight (excess weight) (BMI ?27) who also have weight-related medical problems to help them lose weight and keep the weight off.    1.  Start Wegovy (semaglutide) 0.25 mg once weekly for 4 weeks, then if tolerating increase to 0.5 mg weekly " for 4 weeks, then if tolerating increase to 1 mg weekly for 4 weeks, then if tolerating increase to 1.7 mg weekly for 4 weeks, then if tolerating increase to 2.4 mg weekly thereafter.    -Each Wegovy pen is a once weekly single-dose prefilled pen with a pen injector already built within the pen. Discard the Wegovy pen after use in sharps container.     2. Storage: make sure that when you get the prescription that you store the prescription in the refrigerator until it is time to use the Wegovy pen.  Once it is time to use the Wegovy pen, you can keep the pen at room temperature and it is good for up to 28 days at room temperature.     3.  Potential common side effects: nausea, headache, diarrhea, stomach upset.  If these become unmanageable or concerning symptoms, please make sure to call or mychart.      Go to site: Wegovy video to learn more and watch instruction videos.

## 2023-02-16 NOTE — TELEPHONE ENCOUNTER
Weight Loss Meds:  Confirmed via test claim, pa needed on Pa[employer] insurance.  BIN:[id number] PCN:  Group: [id number] ID:[id number]  Please initiate PA.

## 2023-02-20 NOTE — TELEPHONE ENCOUNTER
PA Initiation    Medication: Semaglutide-Weight Management (WEGOVY) 0.25 MG/0.5ML SOAJ   Insurance Company: [facility] - Phone [phone] Fax [phone]  Pharmacy Filling the Rx: [facility] - [place], [place] - [address] [place] [address]  Filling Pharmacy Phone: [phone]  Filling Pharmacy Fax: [phone]  Start Date: 2/20/2023

## 2023-02-21 NOTE — TELEPHONE ENCOUNTER
Prior Authorization Approval    Authorization Effective Date: 1/21/2023  Authorization Expiration Date: 9/18/2023  Medication: Semaglutide-Weight Management (WEGOVY) 0.25 MG/0.5ML SOAJ--APPROVED  Approved Dose/Quantity:   Reference #:     Insurance Company: [facility] - Phone [phone] Fax [phone]  Expected CoPay:       CoPay Card Available:      Foundation Assistance Needed:    Which Pharmacy is filling the prescription (Not needed for infusion/clinic administered): [facility] - [address]  Pharmacy Notified: Yes  Patient Notified: Yes **Instructed pharmacy to notify patient when script is ready to /ship.**

## 2023-02-27 ENCOUNTER — TRANSFERRED RECORDS (OUTPATIENT)
Dept: FAMILY MEDICINE | Facility: CLINIC | Age: 63
End: 2023-02-27

## 2023-03-15 ENCOUNTER — MYC REFILL (OUTPATIENT)
Dept: PHARMACY | Facility: CLINIC | Age: 63
End: 2023-03-15
Payer: COMMERCIAL

## 2023-03-15 DIAGNOSIS — E66.811 OBESITY, CLASS I, BMI 30-34.9: ICD-10-CM

## 2023-03-15 NOTE — TELEPHONE ENCOUNTER
Patient approved for Wegovy 0.5 mg once weekly refill.  Patient to continue this until following up in May with Jann Arteaga PA-C and MTM pharmacist.    Lauren Bloch, PharmD, BCACP   Medication Therapy Management Pharmacist   [facility] [place]

## 2023-03-27 ENCOUNTER — TRANSFERRED RECORDS (OUTPATIENT)
Dept: FAMILY MEDICINE | Facility: CLINIC | Age: 63
End: 2023-03-27

## 2023-04-26 DIAGNOSIS — E66.811 CLASS 1 OBESITY DUE TO EXCESS CALORIES WITH SERIOUS COMORBIDITY AND BODY MASS INDEX (BMI) OF 30.0 TO 30.9 IN ADULT: Primary | ICD-10-CM

## 2023-04-26 DIAGNOSIS — E66.09 CLASS 1 OBESITY DUE TO EXCESS CALORIES WITH SERIOUS COMORBIDITY AND BODY MASS INDEX (BMI) OF 30.0 TO 30.9 IN ADULT: Primary | ICD-10-CM

## 2023-04-26 RX ORDER — SEMAGLUTIDE 1 MG/.5ML
1 INJECTION, SOLUTION SUBCUTANEOUS
Qty: 2 ML | Refills: 0 | Status: SHIPPED | OUTPATIENT
Start: 2023-04-26 | End: 2023-07-31

## 2023-05-08 ENCOUNTER — LAB (OUTPATIENT)
Dept: LAB | Facility: CLINIC | Age: 63
End: 2023-05-08
Payer: COMMERCIAL

## 2023-05-08 ENCOUNTER — MYC MEDICAL ADVICE (OUTPATIENT)
Dept: SURGERY | Facility: CLINIC | Age: 63
End: 2023-05-08

## 2023-05-08 DIAGNOSIS — E66.811 CLASS 1 OBESITY DUE TO EXCESS CALORIES WITH SERIOUS COMORBIDITY AND BODY MASS INDEX (BMI) OF 30.0 TO 30.9 IN ADULT: ICD-10-CM

## 2023-05-08 DIAGNOSIS — E66.09 CLASS 1 OBESITY DUE TO EXCESS CALORIES WITH SERIOUS COMORBIDITY AND BODY MASS INDEX (BMI) OF 30.0 TO 30.9 IN ADULT: ICD-10-CM

## 2023-05-08 LAB
ANION GAP SERPL CALCULATED.3IONS-SCNC: 10 MMOL/L (ref 7–15)
BUN SERPL-MCNC: 16.5 MG/DL (ref 8–23)
CALCIUM SERPL-MCNC: 9.5 MG/DL (ref 8.8–10.2)
CHLORIDE SERPL-SCNC: 104 MMOL/L (ref 98–107)
CREAT SERPL-MCNC: 0.95 MG/DL (ref 0.67–1.17)
DEPRECATED HCO3 PLAS-SCNC: 27 MMOL/L (ref 22–29)
GFR SERPL CREATININE-BSD FRML MDRD: 90 ML/MIN/1.73M2
GLUCOSE SERPL-MCNC: 93 MG/DL (ref 70–99)
POTASSIUM SERPL-SCNC: 4.5 MMOL/L (ref 3.4–5.3)
SODIUM SERPL-SCNC: 141 MMOL/L (ref 136–145)

## 2023-05-08 PROCEDURE — 80048 BASIC METABOLIC PNL TOTAL CA: CPT

## 2023-05-08 PROCEDURE — 36415 COLL VENOUS BLD VENIPUNCTURE: CPT

## 2023-05-09 NOTE — PROGRESS NOTES
"Ethan is a 62 year old who is being evaluated via a billable video visit.      The patient has been notified of following:     \"This video visit will be conducted via a call between you and your physician/provider. We have found that certain health care needs can be provided without the need for an in-person physical exam.  This service lets us provide the care you need with a video conversation.  If a prescription is necessary we can send it directly to your pharmacy.  If lab work is needed we can place an order for that and you can then stop by our lab to have the test done at a later time.    Video visits are billed at different rates depending on your insurance coverage.  Please reach out to your insurance provider with any questions.    If during the course of the call the physician/provider feels a video visit is not appropriate, you will not be charged for this service.\"    Patient has given verbal consent for Video visit? Yes    How would you like to obtain your AVS? MyChart    If the video visit is dropped, the invitation should be resent by: Text to cell phone: [phone]    Will anyone else be joining your video visit? No    I    Video-Visit Details    Type of service:  Video Visit    Video Start Time: 9:03    Video End Time: 9:17    Originating Location (pt. Location): Home    Distant Location (provider location):  Home    Platform used for Video Visit: [facility] Note         Ethan Weiss  MRN:  [id number]  :  1960  MEL:  5/10/2023        Dear Cuauhtemoc Arredondo MD,    I had the pleasure of seeing your patient Ethan Weiss. He is a 62 year old male who I am continuing to see for treatment of obesity related to:        Assessment   Problem List Items Addressed This Visit     Overweight with body mass index (BMI) of 29 to 29.9 in adult     Continue Wegovy          Relevant Medications    Semaglutide-Weight Management (WEGOVY) 1.7 MG/0.75ML pen "    Semaglutide-Weight Management (WEGOVY) 2.4 MG/0.75ML pen          PLAN/DISCUSSION:  Congratulated patient on overall weight loss.   RX for Wegovy 1.7 (1 refill) and Wegovy 2.4 (1 refill) sent to pharmacy.       FOLLOW-UP:  July with Lauren Bloch PharmD and provider in October for annual visit        SUBJECTIVE/OBJECTIVE:  9 years SP Gastric Bypass patient being seen in follow up after starting Wegovy 2/2023.  Is up to the 1.0 mg once Weekly dose. Tolerating well.  Feels full faster. The day of injection and the day after feels a bit queasy but not bad.   Denies nausea, vomiting, abdominal pain, severe constipation, diarrhea, or heartburn. Happy with weight loss.       Recent diet changes:  Has increased water intake to over 70-80 oz water    Recent exercise/activity changes: walking twice daily. Goes for a longer walk 3-4 times weekly for 45 minutes      CURRENT WEIGHT:   229 lbs 0 oz    Initial Weight (lbs): 318 lbs  Last Visits Weight: 237 lb (107.5 kg)  Cumulative weight loss (lbs): 89  Weight Loss Percentage: 27.99%        MEDICATIONS:   Current Outpatient Medications   Medication Sig Dispense Refill     buPROPion (WELLBUTRIN SR) 100 MG 12 hr tablet Take 2 tablets (200 mg) by mouth daily Take 1 tablet by mouth X 14 days. Then increase to 2 tablets. 180 tablet 0     calcium citrate-vitamin D (CITRACAL PETITES/VITAMIN D) 200-6.25 MG-MCG TABS per tablet Take 2 tablets by mouth 3 times daily       omeprazole (PRILOSEC) 20 MG DR capsule TAKE 1 CAPSULE DAILY 90 capsule 3     Semaglutide-Weight Management (WEGOVY) 0.5 MG/0.5ML pen Inject 0.5 mg Subcutaneous once a week 2 mL 1     Semaglutide-Weight Management (WEGOVY) 1 MG/0.5ML pen Inject 1 mg Subcutaneous every 7 days 2 mL 0     sildenafil (REVATIO) 20 MG tablet Take 30 tablet 3     triamcinolone (KENALOG) 0.1 % ointment as needed Ears, arms, legs       Semaglutide-Weight Management (WEGOVY) 1.7 MG/0.75ML pen Inject 1.7 mg Subcutaneous every 7 days 3 mL 1      "Semaglutide-Weight Management (WEGOVY) 2.4 MG/0.75ML pen Inject 2.4 mg Subcutaneous every 7 days 3 mL 1         PHYSICAL EXAM:  Objective    Ht 6' 2\" (1.88 m)   Wt 229 lb (103.9 kg)   BMI 29.40 kg/m    GENERAL: Healthy, alert and no distress  EYES: Eyes grossly normal to inspection.  No discharge or erythema, or obvious scleral/conjunctival abnormalities.  RESP: No audible wheeze, cough, or visible cyanosis.  No visible retractions or increased work of breathing.    SKIN: Visible skin clear. No significant rash, abnormal pigmentation or lesions.  NEURO: Cranial nerves grossly intact.  Mentation and speech appropriate for age.  PSYCH: Mentation appears normal, affect normal/bright, judgement and insight intact, normal speech and appearance well-groomed.        Sincerely,    Jann Arteaga PA-C      24 minutes spent on the date of the encounter doing chart review, review of test results, patient visit and documentation     "

## 2023-05-10 ENCOUNTER — VIRTUAL VISIT (OUTPATIENT)
Dept: SURGERY | Facility: CLINIC | Age: 63
End: 2023-05-10
Payer: COMMERCIAL

## 2023-05-10 VITALS — BODY MASS INDEX: 29.39 KG/M2 | HEIGHT: 74 IN | WEIGHT: 229 LBS

## 2023-05-10 DIAGNOSIS — E66.3 OVERWEIGHT WITH BODY MASS INDEX (BMI) OF 29 TO 29.9 IN ADULT: ICD-10-CM

## 2023-05-10 PROCEDURE — 99213 OFFICE O/P EST LOW 20 MIN: CPT | Mod: VID | Performed by: PHYSICIAN ASSISTANT

## 2023-05-10 RX ORDER — SEMAGLUTIDE 1.7 MG/.75ML
1.7 INJECTION, SOLUTION SUBCUTANEOUS
Qty: 3 ML | Refills: 1 | Status: SHIPPED | OUTPATIENT
Start: 2023-05-10 | End: 2023-07-31

## 2023-05-10 RX ORDER — SEMAGLUTIDE 2.4 MG/.75ML
2.4 INJECTION, SOLUTION SUBCUTANEOUS
Qty: 3 ML | Refills: 1 | Status: SHIPPED | OUTPATIENT
Start: 2023-05-10 | End: 2023-07-31 | Stop reason: DRUGHIGH

## 2023-05-10 NOTE — PATIENT INSTRUCTIONS
"Nice to talk with you today. Thank you for allowing me the privilege of caring for you. We hope we provided you with the excellent service you deserve.     To ensure the quality of our services you may receive a patient satisfaction survey from an independent monitoring company.  The greatest compliment you can give is \"Likely to Recommend\"    Below is our plan we discussed.-  MARGO Forte      Continue with Liliana. Follow up with Lauren Bloch PharmD in July    Please call [phone] and schedule a follow up with Jann Arteaga PA-C in 5 months for your annual visit.  If you need to reach me sooner you can do so by calling [phone].    Have a great day!     "

## 2023-06-09 ENCOUNTER — TRANSFERRED RECORDS (OUTPATIENT)
Dept: FAMILY MEDICINE | Facility: CLINIC | Age: 63
End: 2023-06-09

## 2023-06-12 ENCOUNTER — TRANSFERRED RECORDS (OUTPATIENT)
Dept: FAMILY MEDICINE | Facility: CLINIC | Age: 63
End: 2023-06-12

## 2023-07-31 ENCOUNTER — VIRTUAL VISIT (OUTPATIENT)
Dept: PHARMACY | Facility: CLINIC | Age: 63
End: 2023-07-31
Payer: COMMERCIAL

## 2023-07-31 VITALS
WEIGHT: 218 LBS | DIASTOLIC BLOOD PRESSURE: 82 MMHG | HEIGHT: 74 IN | SYSTOLIC BLOOD PRESSURE: 128 MMHG | BODY MASS INDEX: 27.98 KG/M2

## 2023-07-31 DIAGNOSIS — K59.00 CONSTIPATION, UNSPECIFIED CONSTIPATION TYPE: Primary | ICD-10-CM

## 2023-07-31 DIAGNOSIS — Z98.84 GASTRIC BYPASS STATUS FOR OBESITY: ICD-10-CM

## 2023-07-31 DIAGNOSIS — R05.3 CHRONIC COUGH: ICD-10-CM

## 2023-07-31 DIAGNOSIS — K21.9 GERD WITHOUT ESOPHAGITIS: ICD-10-CM

## 2023-07-31 DIAGNOSIS — E66.3 OVERWEIGHT WITH BODY MASS INDEX (BMI) OF 29 TO 29.9 IN ADULT: ICD-10-CM

## 2023-07-31 PROCEDURE — 99207 PR NO CHARGE LOS: CPT | Performed by: PHARMACIST

## 2023-07-31 RX ORDER — SEMAGLUTIDE 1.7 MG/.75ML
1.7 INJECTION, SOLUTION SUBCUTANEOUS
Qty: 9 ML | Refills: 0 | Status: SHIPPED | OUTPATIENT
Start: 2023-07-31 | End: 2023-08-10

## 2023-07-31 RX ORDER — POLYETHYLENE GLYCOL 3350 17 G/17G
1 POWDER, FOR SOLUTION ORAL DAILY
Qty: 578 G | Refills: 11 | Status: SHIPPED | OUTPATIENT
Start: 2023-07-31 | End: 2024-07-22

## 2023-07-31 ASSESSMENT — PAIN SCALES - GENERAL: PAINLEVEL: NO PAIN (0)

## 2023-07-31 NOTE — PROGRESS NOTES
Medication Therapy Management (MTM) Encounter    ASSESSMENT:                            Medication Adherence/Access: No issues identified    History Renea-en-Y Gastric Bypass:   Stable. Yearly bariatric labs due Dec 2023.     Weight Management:   Would benefit from continuing at Wegovy 1.7 mg weekly as seeing effective results with manageable but present side effects. As Wegovy at 1.7 mg weekly is now continued an additional maintenance dose, will continue on this dose.     Constipation:   Wegovy can be negatively impacting, but patient wishes to remain on Wegovy at this time. Would benefit from using Miralax daily as needed for constipation.     Chronic Cough/GERD:   Stable.     PLAN:                            Use Miralax 1 capful daily as needed  Continue on Wegovy 1.7 mg weekly     Follow-up: Return in about 8 weeks (around 9/25/2023) for Medication Therapy Management Pharmacist Visit, (scheduled today).    SUBJECTIVE/OBJECTIVE:                          Ethan Weiss is a 62 year old male called for a follow-up visit from 2/2023.       Reason for visit: Wegovy check in.    Allergies/ADRs: Reviewed in chart  Past Medical History: Reviewed in chart  Tobacco: He reports that he has never smoked. He has never used smokeless tobacco.  Alcohol: not currently using    Medication Adherence/Access: no issues reported    History Renea-en-Y Gastric Bypass:   Bariatric Pal multivitamin 1 capsule once daily (18 mg iron, 1000 mcg B12)   Calcium-Vitamin D (Citracal petites) 2 tablets three times daily      Patient had gastric bypass completed in 2014.  He does not have complaints of acid reflux currently. He does not have issues of swallowing food/pills. History of reactive hypoglycemia since surgery? No. No NSAID use. Stopped B12 from last MTM visit.     Hemoglobin   Date Value Ref Range Status   09/02/2022 14.9 13.4 - 17.5 g/dl Final     Ferritin   Date Value Ref Range Status   12/08/2022 62 31 - 409 ng/mL Final   11/18/2020 46  "26 - 388 ng/mL Final     Lab Results   Component Value Date    VITDT 59 12/08/2022      Lab Results   Component Value Date    PTHI 35 12/08/2022      Lab Results   Component Value Date    B12 968 12/08/2022      Lab Results   Component Value Date    MATEUSZ 0.73 12/08/2022     Weight Management:   Wegovy 1.7 mg once weekly      Followed by Jann Arteaga PA-C, seen 5/10/2023 for Re-Establish Post-Bariatric Consult . Patient is experiencing the follow side effects: constipation. Losing on average 1 lb per week. Patient reports walking twice daily, 3-4 times per week will do longer 45 minute walk. Finds that this dose of Wegovy is working effectively. He plans to start implementing more fruits and vegetables into diet. Ensures to have protein with each meal.   Tried/Failed/Contraindicated Medications: Topiramate: ineffective.      Wt Readings from Last 4 Encounters:   07/31/23 218 lb (98.9 kg)   05/10/23 229 lb (103.9 kg)   11/23/22 237 lb (107.5 kg)   11/23/22 237 lb (107.5 kg)       Estimated body mass index is 27.99 kg/m  as calculated from the following:    Height as of this encounter: 6' 2\" (1.88 m).    Weight as of this encounter: 218 lb (98.9 kg).    Constipation:   Having bowel movement every 2-3 days. Tried dulcolax and effective as needed, uses a 2-4 times per week.  Drinking 70-80 oz water daily. Minor issues at baseline but worsened since starting Wegovy. Does find that increased protein intake also a factor. Straining ~1 time every week. Sometimes can take 45+ minutes or longer to pass stool.     Chronic Cough/GERD:   Prilosec (omeprazole) 20 mg once daily until cold resolved.     Not currently using. Chronic cough after cold or flu historically- so will start this when sick and helps to relieve cough after sickness. Reports then will stop and doesn't have issues of cough after this. Then will restart when sick. The patient does not have a history of GI bleed. Patient feels that current regimen is effective " "when needed.     Today's Vitals: /82   Ht 6' 2\" (1.88 m)   Wt 218 lb (98.9 kg)   BMI 27.99 kg/m    ----------------    I spent 15 minutes with this patient today. All changes were made via collaborative practice agreement with Jann Arteaga PA-C. A copy of the visit note was provided to the patient's provider(s).    A summary of these recommendations was sent via [employer].    Lauren Bloch, PharmD, BCACP   Medication Therapy Management Pharmacist   [facility] [place]    Telemedicine Visit Details  Type of service:  Telephone visit  Start Time:  8:30 AM  End Time:  8:45 AM     Medication Therapy Recommendations  Overweight (BMI 25.0-29.9)    Current Medication: Semaglutide-Weight Management (WEGOVY) 1.7 MG/0.75ML pen (Discontinued)   Rationale: Undesirable effect - Adverse medication event - Safety   Recommendation: Start Medication - MiraLax 17 g Pack   Status: Accepted per CPA            "

## 2023-07-31 NOTE — NURSING NOTE
Is the patient currently in the state of [place]? YES    Visit mode:TELEPHONE    If the visit is dropped, the patient can be reconnected by: TELEPHONE VISIT: Phone number: [phone]    Will anyone else be joining the visit? NO      How would you like to obtain your AVS? MyChart    Are changes needed to the allergy or medication list? NO    Reason for visit: RECHECK      Verenice Alvarez

## 2023-07-31 NOTE — PATIENT INSTRUCTIONS
"Recommendations from today's MTM visit:                                                    MTM (medication therapy management) is a service provided by a clinical pharmacist designed to help you get the most of out of your medicines.      Use Miralax 1 capful daily as needed  Continue on Wegovy 1.7 mg weekly     Follow-up: Return in about 8 weeks (around 9/25/2023) for Medication Therapy Management Pharmacist Visit, (scheduled today).    It was great speaking with you today.  I value your experience and would be very thankful for your time in providing feedback in our clinic survey. In the next few days, you may receive an email or text message from [email or web address] with a link to a survey related to your  clinical pharmacist.\"     To schedule another appointment with your MTM pharmacist, please call [facility] Scheduling at [phone].     My Clinical Pharmacist's contact information:                                                      Please feel free to contact me with any questions or concerns you have.      Lauren Bloch, PharmD  Medication Therapy Management Pharmacist   [facility]             "

## 2023-07-31 NOTE — Clinical Note
Continuing patient on Wegovy at 1.7 mg weekly as seeing effective weight loss results with manageable but present side effects (constipation). Suggested Miralax as needed. DONNADIPTI Wegovy now has package insert changed so 1.7 mg weekly is also considered another maintenance dose. So, will be doing that in this patient. THanks! Tosha KRISHNAN

## 2023-08-02 ENCOUNTER — TELEPHONE (OUTPATIENT)
Dept: SURGERY | Facility: CLINIC | Age: 63
End: 2023-08-02
Payer: COMMERCIAL

## 2023-08-03 NOTE — TELEPHONE ENCOUNTER
PA Initiation    Medication: WEGOVY 1.7 MG/0.75ML SC SOAJ  Insurance Company: [employer] - Phone [phone] Fax [phone]  Pharmacy Filling the Rx: [facility] - [address]  Filling Pharmacy Phone: [phone]  Filling Pharmacy Fax: [phone]  Start Date: 8/3/2023

## 2023-08-03 NOTE — TELEPHONE ENCOUNTER
"[facility] PA REQUEST    Prior Authorization Retail Medication Request    Medication/Dose: Wegovy  ICD code (if different than what is on RX):    Previously Tried and Failed:  diet and exercise for at least 3 months without sustainable weight loss. Topiramate: ineffective  Rationale:  Ethan Weiss is a 62 year old male with a diagnosis of obesity (BMI at least 30 kg/m2). Currently elevated systolic blood pressure therefore phentermine may not be appropriate.  Wegovy was started February 2023 at initial weight of 237 lb. Patient has lost to current weight of 218 lb on Wegovy 1.7 mg weekly. Ethan has lost 19 lb, or greater than 8% of body weight from baseline.     Requesting continued coverage of Wegovy at 1.7 mg weekly. Per recent package insert update and FDA approval, Wegovy at 1.7 mg weekly is considered a maintenance dose. Therefore, patient should not have to increase to 2.4 mg weekly and should be allowed to continue Wegovy 1.7 mg weekly as long term weight loss medication.     Estimated body mass index is 27.99 kg/m  as calculated from the following:    Height as of 7/31/23: 6' 2\" (1.88 m).    Weight as of 7/31/23: 218 lb (98.9 kg).     Insurance Name:    Insurance ID:        Pharmacy Information (if different than what is on RX)  Name:    Phone:      Lauren Bloch, PharmD, BCACP   Medication Therapy Management Pharmacist   [facility]          "

## 2023-08-04 NOTE — TELEPHONE ENCOUNTER
Prior Authorization Approval    Medication: WEGOVY 1.7 MG/0.75ML SC SOAJ  Authorization Effective Date: 7/5/2023  Authorization Expiration Date: 9/3/2023  Approved Dose/Quantity:    Reference #: CMM Key: [id number]   Insurance Company: [employer] - Phone [phone] Fax [phone]  Expected CoPay:       CoPay Card Available:      Financial Assistance Needed:    Which Pharmacy is filling the prescription: [facility] - [address]  Pharmacy Notified:    Patient Notified:

## 2023-08-31 ENCOUNTER — TELEPHONE (OUTPATIENT)
Dept: SURGERY | Facility: CLINIC | Age: 63
End: 2023-08-31
Payer: COMMERCIAL

## 2023-08-31 NOTE — TELEPHONE ENCOUNTER
PA Initiation    Medication: WEGOVY 1.7 MG/0.75ML SC SOAJ  Insurance Company: [employer] Non-Specialty PA's - Phone [phone] Fax [phone]  Pharmacy Filling the Rx: [id number] [place] [address] AT [facility] OF [address]  Filling Pharmacy Phone: [phone]  Filling Pharmacy Fax: [phone]  Start Date: 8/31/2023

## 2023-09-04 ASSESSMENT — ENCOUNTER SYMPTOMS
HEARTBURN: 0
CHILLS: 0
NAUSEA: 0
CONSTIPATION: 0
ABDOMINAL PAIN: 0
NERVOUS/ANXIOUS: 0
DIARRHEA: 0
MYALGIAS: 0
JOINT SWELLING: 0
FEVER: 0
SORE THROAT: 0
HEADACHES: 0
SHORTNESS OF BREATH: 0
EYE PAIN: 0
HEMATOCHEZIA: 0
WEAKNESS: 0
PALPITATIONS: 0
PARESTHESIAS: 0
HEMATURIA: 0
DYSURIA: 0
ARTHRALGIAS: 0
DIZZINESS: 0
COUGH: 0
FREQUENCY: 0

## 2023-09-06 NOTE — TELEPHONE ENCOUNTER
Follow Up Phone Call    Insurance Company: [employer] Non-Specialty PA's - Phone [phone] Fax [phone]     Medication: WEGOVY 1.7 MG/0.75ML SC SOAJ     Status: Still in Review

## 2023-09-08 NOTE — PROGRESS NOTES
3  SUBJECTIVE:   CC: Ethan Weiss is an 62 year old male who presents for preventive health visit.       Patient has been advised of split billing requirements and indicates understanding: Yes  Healthy Habits:  Answers submitted by the patient for this visit:  Annual Preventive Visit (Submitted on 9/4/2023)  Chief Complaint: Annual Exam:  Frequency of exercise:: 4-5 days/week  Getting at least 3 servings of Calcium per day:: Yes  Diet:: Regular (no restrictions)  Taking medications regularly:: Yes  Medication side effects:: None  Bi-annual eye exam:: Yes  Dental care twice a year:: Yes  Sleep apnea or symptoms of sleep apnea:: None  abdominal pain: No  Blood in stool: No  Blood in urine: No  chest pain: No  chills: No  congestion: No  constipation: No  cough: No  diarrhea: No  dizziness: No  ear pain: No  eye pain: No  nervous/anxious: No  fever: No  frequency: No  genital sores: No  headaches: No  hearing loss: No  heartburn: No  arthralgias: No  joint swelling: No  peripheral edema: No  mood changes: No  myalgias: No  nausea: No  dysuria: No  palpitations: No  Skin sensation changes: No  sore throat: No  urgency: No  rash: No  shortness of breath: No  visual disturbance: No  weakness: No  impotence: No  penile discharge: No  Additional concerns today:: Yes  Exercise outside of work (Submitted on 9/4/2023)  Chief Complaint: Annual Exam:  Duration of exercise:: 45-60 minutes      HEARING FREQUENCY:     Right Ear:     500 Hz : Pass   1000 Hz: Pass   2000 Hz: Pass   4000 Hz: Fail  Left Ear:     500 Hz : Pass   1000 Hz: Pass   2000 Hz: Pass   4000 Hz: Fail    Today's PHQ-2 Score:       7/31/2023     8:08 AM 8/26/2022     7:55 AM   PHQ-2 ( 1999 [employer])   Q1: Little interest or pleasure in doing things 0 0   Q2: Feeling down, depressed or hopeless 0 0   PHQ-2 Score 0 0   Q1: Little interest or pleasure in doing things  Not at all   Q2: Feeling down, depressed or hopeless  Not at all   PHQ-2 Score  0       Social  History     Tobacco Use    Smoking status: Never    Smokeless tobacco: Never   Substance Use Topics    Alcohol use: Yes     Alcohol/week: 0.0 - 1.0 standard drinks of alcohol     Comment: 1 drink per week     If you drink alcohol do you typically have >3 drinks per day or >7 drinks per week? No                      Last PSA: No results found for: PSA    Reviewed orders with patient. Reviewed health maintenance and updated orders accordingly - Yes  Lab work is in process    Reviewed and updated as needed this visit by clinical staff   Tobacco  Allergies   Problems           Reviewed and updated as needed this visit by Provider      Problems            Past Medical History:   Diagnosis Date    Atopic eczema     Cluster headache     rare in 2016    Family history of prostate cancer     dad    Gastric bypass status for obesity     Health Care Home     History of anxiety     2016 resolved    Hyperlipidaemia LDL goal < 100     IGT (impair glucose tolerance)     history of    Obesity     s/p gastric bypass       Past Surgical History:   Procedure Laterality Date    APPENDECTOMY  1970    LAPAROSCOPIC BYPASS GASTRIC  11/18/2013    Procedure: LAPAROSCOPIC BYPASS GASTRIC;  LAPARSCOPIC MARITA-EN-Y GASTRIC BYPASS;  Surgeon: August Flores MD;  Location: [facility] OR    SALIVARY GLAND SURGERY      TONSILLECTOMY & ADENOIDECTOMY      VARICOCELECTOMY         ROS:  CONSTITUTIONAL: NEGATIVE for fever, chills, change in weight  INTEGUMENTARY/SKIN: NEGATIVE for worrisome rashes, moles or lesions  EYES: NEGATIVE for vision changes or irritation  ENT: NEGATIVE for ear, mouth and throat problems  RESP: NEGATIVE for significant cough or SOB  CV: NEGATIVE for chest pain, palpitations or peripheral edema  GI: NEGATIVE for nausea, abdominal pain, heartburn, or change in bowel habits   male: negative for dysuria, hematuria, decreased urinary stream, erectile dysfunction, urethral discharge  MUSCULOSKELETAL: NEGATIVE for significant arthralgias  "or myalgia  NEURO: NEGATIVE for weakness, dizziness or paresthesias  PSYCHIATRIC: NEGATIVE for changes in mood or affect    OBJECTIVE:   /83   Pulse 70   Ht 1.854 m (6' 1\")   Wt 99.8 kg (220 lb)   SpO2 99%   BMI 29.03 kg/m    EXAM:  GENERAL: healthy, alert and no distress  EYES: Eyes grossly normal to inspection, PERRL and conjunctivae and sclerae normal  HENT: ear canals and TM's normal, nose and mouth without ulcers or lesions  NECK: no adenopathy, no asymmetry, masses, or scars and thyroid normal to palpation  RESP: lungs clear to auscultation - no rales, rhonchi or wheezes  CV: regular rate and rhythm, normal S1 S2, no S3 or S4, no murmur, click or rub, no peripheral edema and peripheral pulses strong  ABDOMEN: soft, nontender, no hepatosplenomegaly, no masses and bowel sounds normal  MS: no gross musculoskeletal defects noted, no edema  SKIN: no suspicious lesions or rashes  NEURO: Normal strength and tone, mentation intact and speech normal  PSYCH: mentation appears normal, affect normal/bright    Diagnostic Test Results:  Labs reviewed in Epic    ASSESSMENT/PLAN:   Ethan was seen today for physical, derm problem, flu shot and foot problems.    Diagnoses and all orders for this visit:    Routine general medical examination at a health care facility    Postsurgical malabsorption due toGastric bypass status for obesity  Check labs    GERD without esophagitis  Discussed the functional nature of this condition regarding what they eat, how they eat, when they eat, and how much they eat as all contributing to gastroesophageal symptoms.    Recommend anti-reflux diet and eating patterns emphasizing smaller more frequent meals and timing relative to bedtime.  Also recommend avoiding tomatoes, onions, spicy foods, caffeine, or any other food/beverage that cause increased reflux.    If symptoms not controlled on current therapies, then need to return for further evaluation and consideration of further " "studies.    -     CBC with Diff/Plt ([facility])    IGT (impair glucose tolerance)  Weight loss helps    Primary osteoarthritis involving multiple joints  Helped with weight loss      Screening for prostate cancer    Screening for heart disease  -     Comprehensive metabolic panel; Future  -     Lipid Profile ([facility])    Need for vaccination  -     INFLUENZA VACCINE 65+ (FLUAD)        Patient has been advised of split billing requirements and indicates understanding: Yes  COUNSELING:  Reviewed preventive health counseling, as reflected in patient instructions       Regular exercise       Healthy diet/nutrition    Estimated body mass index is 29.03 kg/m  as calculated from the following:    Height as of this encounter: 1.854 m (6' 1\").    Weight as of this encounter: 99.8 kg (220 lb).        He reports that he has never smoked. He has never used smokeless tobacco.      Counseling Resources:  ATP IV Guidelines  Pooled Cohorts Equation Calculator  FRAX Risk Assessment  ICSI Preventive Guidelines  Dietary Guidelines for Americans, 2010  USDA's MyPlate  ASA Prophylaxis  Lung CA Screening    Cuauhtemoc Arredondo MD  [facility]  "

## 2023-09-11 ENCOUNTER — OFFICE VISIT (OUTPATIENT)
Dept: FAMILY MEDICINE | Facility: CLINIC | Age: 63
End: 2023-09-11

## 2023-09-11 VITALS
SYSTOLIC BLOOD PRESSURE: 135 MMHG | DIASTOLIC BLOOD PRESSURE: 83 MMHG | HEART RATE: 70 BPM | OXYGEN SATURATION: 99 % | WEIGHT: 220 LBS | HEIGHT: 73 IN | BODY MASS INDEX: 29.16 KG/M2

## 2023-09-11 DIAGNOSIS — Z98.84 GASTRIC BYPASS STATUS FOR OBESITY: ICD-10-CM

## 2023-09-11 DIAGNOSIS — M15.0 PRIMARY OSTEOARTHRITIS INVOLVING MULTIPLE JOINTS: ICD-10-CM

## 2023-09-11 DIAGNOSIS — Z00.00 ROUTINE GENERAL MEDICAL EXAMINATION AT A HEALTH CARE FACILITY: Primary | ICD-10-CM

## 2023-09-11 DIAGNOSIS — R73.02 IMPAIRED GLUCOSE TOLERANCE: ICD-10-CM

## 2023-09-11 DIAGNOSIS — Z23 NEED FOR VACCINATION: ICD-10-CM

## 2023-09-11 DIAGNOSIS — K21.9 GERD WITHOUT ESOPHAGITIS: ICD-10-CM

## 2023-09-11 DIAGNOSIS — K91.2 POSTSURGICAL MALABSORPTION: ICD-10-CM

## 2023-09-11 DIAGNOSIS — Z13.6 SCREENING FOR HEART DISEASE: ICD-10-CM

## 2023-09-11 DIAGNOSIS — Z12.5 SCREENING FOR PROSTATE CANCER: ICD-10-CM

## 2023-09-11 LAB
% GRANULOCYTES: 74.3 % (ref 42.2–75.2)
ALBUMIN SERPL BCG-MCNC: 4.4 G/DL (ref 3.5–5.2)
ALP SERPL-CCNC: 67 U/L (ref 40–129)
ALT SERPL W P-5'-P-CCNC: 32 U/L (ref 0–70)
ANION GAP SERPL CALCULATED.3IONS-SCNC: 10 MMOL/L (ref 7–15)
AST SERPL W P-5'-P-CCNC: 11 U/L (ref 0–45)
BILIRUB SERPL-MCNC: 0.6 MG/DL
BUN SERPL-MCNC: 13.4 MG/DL (ref 8–23)
CALCIUM SERPL-MCNC: 9.3 MG/DL (ref 8.8–10.2)
CHLORIDE SERPL-SCNC: 104 MMOL/L (ref 98–107)
CHOLESTEROL: 151 MG/DL (ref 100–199)
CREAT SERPL-MCNC: 0.91 MG/DL (ref 0.67–1.17)
DEPRECATED HCO3 PLAS-SCNC: 28 MMOL/L (ref 22–29)
EGFRCR SERPLBLD CKD-EPI 2021: >90 ML/MIN/1.73M2
FASTING?: YES
GLUCOSE SERPL-MCNC: 96 MG/DL (ref 70–99)
HCT VFR BLD AUTO: 43.6 % (ref 39–51)
HDL (RMG): 42 MG/DL (ref 40–?)
HEMOGLOBIN: 14.5 G/DL (ref 13.4–17.5)
LDL CALCULATED (RMG): 94 MG/DL (ref 0–130)
LYMPHOCYTES NFR BLD AUTO: 18.8 % (ref 20.5–51.1)
MCH RBC QN AUTO: 29.6 PG (ref 27–31)
MCHC RBC AUTO-ENTMCNC: 33.3 G/DL (ref 33–37)
MCV RBC AUTO: 88.9 FL (ref 80–100)
MONOCYTES NFR BLD AUTO: 6.9 % (ref 1.7–9.3)
PLATELET # BLD AUTO: 212 K/UL (ref 140–450)
POTASSIUM SERPL-SCNC: 4 MMOL/L (ref 3.4–5.3)
PROT SERPL-MCNC: 7.1 G/DL (ref 6.4–8.3)
PSA SERPL DL<=0.01 NG/ML-MCNC: 5.59 NG/ML (ref 0–4.5)
RBC # BLD AUTO: 4.91 X10/CMM (ref 4.2–5.9)
SODIUM SERPL-SCNC: 142 MMOL/L (ref 136–145)
TRIGLYCERIDES (RMG): 78 MG/DL (ref 0–149)
WBC # BLD AUTO: 4.3 X10/CMM (ref 3.8–11)

## 2023-09-11 PROCEDURE — G0103 PSA SCREENING: HCPCS | Performed by: FAMILY MEDICINE

## 2023-09-11 PROCEDURE — 80061 LIPID PANEL: CPT | Mod: QW | Performed by: FAMILY MEDICINE

## 2023-09-11 PROCEDURE — 85025 COMPLETE CBC W/AUTO DIFF WBC: CPT | Performed by: FAMILY MEDICINE

## 2023-09-11 PROCEDURE — 80053 COMPREHEN METABOLIC PANEL: CPT | Performed by: FAMILY MEDICINE

## 2023-09-11 PROCEDURE — 99396 PREV VISIT EST AGE 40-64: CPT | Performed by: FAMILY MEDICINE

## 2023-09-11 PROCEDURE — 36415 COLL VENOUS BLD VENIPUNCTURE: CPT | Performed by: FAMILY MEDICINE

## 2023-09-12 ENCOUNTER — MYC MEDICAL ADVICE (OUTPATIENT)
Dept: SURGERY | Facility: CLINIC | Age: 63
End: 2023-09-12
Payer: COMMERCIAL

## 2023-09-12 DIAGNOSIS — E66.3 OVERWEIGHT WITH BODY MASS INDEX (BMI) OF 29 TO 29.9 IN ADULT: ICD-10-CM

## 2023-09-12 RX ORDER — SEMAGLUTIDE 1.7 MG/.75ML
1.7 INJECTION, SOLUTION SUBCUTANEOUS
Qty: 9 ML | Refills: 0 | Status: SHIPPED | OUTPATIENT
Start: 2023-09-12 | End: 2024-03-04

## 2023-09-15 NOTE — TELEPHONE ENCOUNTER
Prior Authorization Approval    Medication: WEGOVY 1.7 MG/0.75ML SC SOAJ  Authorization Effective Date: 7/5/2023  Authorization Expiration Date: 3/1/2024  Approved Dose/Quantity:    Reference #: Key: [id number]   Insurance Company: [employer] Non-Specialty PA's - Phone [phone] Fax [phone]  Expected CoPay:       CoPay Card Available:      Financial Assistance Needed:    Which Pharmacy is filling the prescription: [id number] [place] [id number] [facility]  Pharmacy Notified:    Patient Notified:

## 2023-09-15 NOTE — TELEPHONE ENCOUNTER
Called Pharmacy to run a test claim the stated the received a note to close all wegovy scripts so they would need new script.

## 2023-09-18 ENCOUNTER — VIRTUAL VISIT (OUTPATIENT)
Dept: PHARMACY | Facility: CLINIC | Age: 63
End: 2023-09-18
Payer: COMMERCIAL

## 2023-09-18 VITALS — BODY MASS INDEX: 27.59 KG/M2 | WEIGHT: 215 LBS | HEIGHT: 74 IN

## 2023-09-18 DIAGNOSIS — Z98.84 GASTRIC BYPASS STATUS FOR OBESITY: ICD-10-CM

## 2023-09-18 DIAGNOSIS — E66.3 OVERWEIGHT WITH BODY MASS INDEX (BMI) OF 29 TO 29.9 IN ADULT: Primary | ICD-10-CM

## 2023-09-18 PROCEDURE — 99207 PR NO CHARGE LOS: CPT | Performed by: PHARMACIST

## 2023-09-18 ASSESSMENT — PAIN SCALES - GENERAL: PAINLEVEL: NO PAIN (0)

## 2023-09-18 NOTE — NURSING NOTE
Is the patient currently in the state of [place]? YES    Visit mode:TELEPHONE    If the visit is dropped, the patient can be reconnected by: TELEPHONE VISIT: Phone number:   Telephone Information:   Mobile [phone]   Mobile [phone]       Will anyone else be joining the visit? NO  (If patient encounters technical issues they should call [phone]    How would you like to obtain your AVS? MyChart    Are changes needed to the allergy or medication list? No    Reason for visit: RECHECK    Adelfo LAWSON

## 2023-09-18 NOTE — PROGRESS NOTES
"Disease State Management Encounter:                          Ethan Weiss is a 62 year old male called for a follow-up visit from 7/31/2023.  Insurance does not cover comprehensive medication review with Medication Therapy Management (MTM) now, as previously covered last visit 7/31/2023. Patient declines private pay. Therefore, completed one time consult today.     Reason for visit: Wegovy check in.    Weight Management:   Wegovy 1.7 mg once weekly      Followed by Jann Arteaga PA-C, seen 5/10/2023 for Re-Establish Post-Bariatric Consult . Patient is experiencing the follow side effects: constipation improved with starting miralax 1 capful daily. Now having no issues with constipation. Losing on average 1 lb per week generals. Getting in 3 meals daily. Patient reports walking twice daily 20 minutes each time, then 60 minutes 2 times per week with dogs. Finds that this dose of Wegovy is working effectively. He plans to start implementing more fruits and vegetables into diet. Ensures to have protein with each meal. \"Two thumbs up\" with his progress and thoughts on Wegovy. History of RYGB years ago, went from 330 lb then to 210 lb then weight regain to 230s.   Tried/Failed/Contraindicated Medications: Topiramate: ineffective.     Current Weight: 215 lb  Initial Consult Weight: 318 lb  Cumulative Weight Loss: 103 lb, 32% from baseline    Wt Readings from Last 4 Encounters:   09/18/23 215 lb (97.5 kg)   09/11/23 220 lb (99.8 kg)   07/31/23 218 lb (98.9 kg)   05/10/23 229 lb (103.9 kg)     Estimated body mass index is 27.6 kg/m  as calculated from the following:    Height as of this encounter: 6' 2\" (1.88 m).    Weight as of this encounter: 215 lb (97.5 kg).    Today's Vitals: Ht 6' 2\" (1.88 m)   Wt 215 lb (97.5 kg)   BMI 27.60 kg/m      Assessment/Plan:  Would benefit from continuing Wegovy at 1.7 mg once weekly as progressing with weight loss with limited side effects.     Continue Wegovy 1.7 mg weekly.    Follow-up: " Jann Arteaga PA-C in 8 weeks as planned. Medication Therapy Management (MTM)  not covered so patient declined scheduling follow up.    I spent 15 minutes with this patient today. All changes were made via collaborative practice agreement with Jann Arteaga PA-C. A copy of the visit note was provided to the patient's provider(s).    A summary of these recommendations was sent via [employer].    Lauren Bloch, PharmD, BCACP   Medication Therapy Management Pharmacist   [facility]       Medication Therapy Recommendations  No medication therapy recommendations to display

## 2023-09-21 ENCOUNTER — TELEPHONE (OUTPATIENT)
Dept: FAMILY MEDICINE | Facility: CLINIC | Age: 63
End: 2023-09-21

## 2023-09-21 NOTE — TELEPHONE ENCOUNTER
Called and spoke with patient regarding results per Dr Arredondo. Patient reports that he saw Dr Connor for ED and elevated PSA 2/27/23. PSA biopsy done 3/27/23. Patient follow up with Dr Connor 6/9/23 to discuss bx results, all 12 bx were negative. Call routed to Dr Arredondo for review. Cyn Lira

## 2023-09-22 NOTE — PATIENT INSTRUCTIONS
Recommendations from today's disease management visit:                                                      Continue Wegovy 1.7 mg weekly. RX with refills sent into pharmacy.     Follow-up: Jann Arteaga PA-C in 8 weeks as planned.     To schedule another MTM appointment, please call the clinic directly or you may call the MTM scheduling line at [phone] or toll-free at [phone].     My Clinical Pharmacist's contact information:                                                      Please feel free to contact me with any questions or concerns you have.      Lauren Bloch, PharmD, BCACP   Medication Therapy Management Pharmacist   [facility] [place]

## 2023-10-04 PROCEDURE — 90674 CCIIV4 VAC NO PRSV 0.5 ML IM: CPT | Performed by: FAMILY MEDICINE

## 2023-10-04 PROCEDURE — 90471 IMMUNIZATION ADMIN: CPT | Performed by: FAMILY MEDICINE

## 2023-11-08 NOTE — PROGRESS NOTES
"Ethan is a 62 year old who is being evaluated via a billable video visit.      The patient has been notified of following:     \"This video visit will be conducted via a call between you and your physician/provider. We have found that certain health care needs can be provided without the need for an in-person physical exam.  This service lets us provide the care you need with a video conversation.  If a prescription is necessary we can send it directly to your pharmacy.  If lab work is needed we can place an order for that and you can then stop by our lab to have the test done at a later time.    Video visits are billed at different rates depending on your insurance coverage.  Please reach out to your insurance provider with any questions.    If during the course of the call the physician/provider feels a video visit is not appropriate, you will not be charged for this service.\"    Patient has given verbal consent for Video visit? Yes    How would you like to obtain your AVS? MyChart    If the video visit is dropped, the invitation should be resent by: MY CHART    Will anyone else be joining your video visit? No    I    Video-Visit Details    Type of service:  Video Visit    Video Start Time: 9:03 AM    Video End Time:9:21 AM    Originating Location (pt. Location): Home    Distant Location (provider location):  Home    Platform used for Video Visit: [facility] Bariatric Surgery Note      RE: Ethan Weiss  MR#: [id number]  : 1960  VISIT DATE: 2023    Dear Cuauhtemoc Arredondo,    I had the pleasure of seeing your patient, Ethan Weiss, in my post-bariatric surgery assessment clinic.    CHIEF COMPLAINT: Post-bariatric surgery follow-up.  Overall doing very well. Continues to use wegovy 1.7 mg once weekly. Followed up last with Lauren Bloch PharmD 23.  Is noticing that some of the appetite suppression has lessened a bit. Has noticed the desire to snack more. Is wondering about " increasing the dose of the wegovy.   Looking at senior living next year.     Denies nausea, vomiting, abdominal pain, diarrhea, or heartburn.  Does have constipation but miralax helps a lot.       HISTORY OF PRESENT ILLNESS:      11/3/2023    10:56 AM   Questions Regarding Prior Weight Loss Surgery Reviewed With Patient   I had the following weight loss procedure Renea-en-y Gastric Bypass   What year was your surgery? 2013   How has your weight changed since your last visit? I have lost weight   Do you currently have any of the following Heartburn, acid reflux, or GERD (acid reflux disease)?   Do you have any concerns today? Wegovy?     Patient is taking the following bariatric postoperative vitamins:  1 Bariatric Pal MVI   No Vitamin D due to MVI  1500 mg of Calcium daily in divided doses  No B vitamins due to MVI      Weight History:      11/3/2023    10:56 AM   --   What is your highest lifetime weight? 321   What is your lowest weight since surgery? (In pounds) 210     Initial Weight (lbs): 318 lbs  Weight: 210 lb (95.3 kg)  Last Visits Weight: 220 lb (99.8 kg)  Cumulative weight loss (lbs): 108  Weight Loss Percentage: 33.96%          11/3/2023    10:56 AM   Questions Regarding Co-Morbidities and Health Concerns Reviewed With Patient   Pre-diabetes Gone away   Diabetes II Never   High Blood Pressure Gone Away   High cholesterol Gone away   Heartburn/Reflux Improved - takes omeprazole 20 mg daily. Has not skipped days   Sleep apnea Never   PCOS Never   Back pain Never   Joint pain Never   Lower leg swelling Never           11/3/2023    10:56 AM   Eating Habits   How many meals do you eat per day? 3   Do you snack between meals? Yes   How much food are you eating at each meal? 1/2 cup to 1 cup   Are you able to separate your meals and liquids by at least 30 minutes? Sometimes   Are you able to avoid liquid calories? Sometimes           11/3/2023    10:56 AM   Exercise Questions Reviewed With Patient   How often do  "you exercise? Daily   What is the duration of your exercise (in minutes)? 60+ Minutes   What types of exercise do you do? walking   What keeps you from being more active? I should be more active but I just have not gotten around to it     Does a lot of walking with dogs and several longer walks for abut 90 minutes. [employer] classes    Social History:      11/3/2023    10:56 AM   --   Are you smoking? No   Are you drinking alcohol? Yes   How much alcohol? 1-2 units per month     Drinking  oz water    Medications:  Current Outpatient Medications   Medication    calcium citrate-vitamin D (CITRACAL PETITES/VITAMIN D) 200-6.25 MG-MCG TABS per tablet    omeprazole (PRILOSEC) 20 MG DR capsule    polyethylene glycol (MIRALAX) 17 GM/Dose powder    Semaglutide-Weight Management (WEGOVY) 1.7 MG/0.75ML pen    Semaglutide-Weight Management (WEGOVY) 2.4 MG/0.75ML pen    sildenafil (REVATIO) 20 MG tablet    triamcinolone (KENALOG) 0.1 % external ointment     No current facility-administered medications for this visit.         11/3/2023    10:56 AM   --   Do you avoid NSAIDs such as (Ibuprofen, Aleve, Naproxen, Advil)? Yes     caffeine use maybe once weekly     ROS:  GI:       11/3/2023    10:56 AM   --   Vomiting No   Diarrhea No   Constipation No   Swallowing trouble No   Abdominal pain No   Heartburn No     Skin:       11/3/2023    10:56 AM   [facility] RBS ROS - SKIN   Rash in skin folds No     Psych:       11/3/2023    10:56 AM   --   Depression No   Anxiety No     :       11/3/2023    10:56 AM   [facility] RBS ROS -    Stress urinary incontinence No       LABS/IMAGING/MEDICAL RECORDS REVIEW: [facility]      PHYSICAL EXAMINATION:  Ht 6' 1\" (1.854 m)   Wt 210 lb (95.3 kg)   BMI 27.71 kg/m     GENERAL: Healthy, alert and no distress  EYES: Eyes grossly normal to inspection.  No discharge or erythema, or obvious scleral/conjunctival abnormalities.  RESP: No audible wheeze, cough, or visible cyanosis.  No visible retractions or increased " work of breathing.    SKIN: Visible skin clear. No significant rash, abnormal pigmentation or lesions.  NEURO: Cranial nerves grossly intact.  Mentation and speech appropriate for age.  PSYCH: Mentation appears normal, affect normal/bright, judgement and insight intact, normal speech and appearance well-groomed.      ASSESSMENT AND PLAN:      1. 10 years status laparoscopic gastric bypass    We reviewed the important post op bariatric recommendations:  eating 3 meals daily  eating protein first, getting >60gm protein daily  eating slowly, chewing food well  avoiding/limiting calorie containing beverages  avoiding fluids 30 minutes before, during, and after meals  limiting restaurant or cafeteria eating to twice a week or less  Pt reminded to avoid marginal ulcers he should avoid tobacco at all, alcohol in excess, caffeine, and NSAIDS (unless indicated for cardioprotection or othewise and opposed by a PPI).  Pt encouraged to establish and maintain a consistent physical activity routine, 6-8 hours of restorative sleep each night and optimal stress management.  Pt counseled on the importance of life long vitamin supplementation and life long follow up.     2. Current Body mass index is 27.71 kg/m .  Congratulated patient on weight loss. Will send over RX for wegovy 2.4 mg    3. History of Obesity  Congratulated patient on overall weight loss using lifestyle, diet and medications in addition to surgery.   Initial BMI:40.28  Current BMI: 27.71     Initial Weight (lbs): 318 lbs  Weight: 210 lb (95.3 kg)  Last Visits Weight: 220 lb (99.8 kg)  Cumulative weight loss (lbs): 108  Weight Loss Percentage: 33.96%  4. Post surgical malabsorption:   Labs ordered per protocol.   Follow food plan per dietitian recommendations.   Continue taking recommended post-op vitamins.    5. GERD without esophagitis - patient will do a trial off of omeprazole 20 mg. If symptoms return will restart  6. Return to clinic in 3-4 months for a  medication check .    Sincerely,    Jann Arteaga PA-C      30 minutes spent on the date of the encounter doing chart review, review of test results, patient visit and documentation

## 2023-11-08 NOTE — PROGRESS NOTES
Ethan is a 62 year old who is being evaluated via a billable video visit.      How would you like to obtain your AVS? MyChart  If the video visit is dropped, the invitation should be resent by: Text to cell phone: [phone]  Will anyone else be joining your video visit? No          Video-Visit Details    Type of service:  Video Visit     Originating Location (pt. Location): Home    Distant Location (provider location):  Off-site  Platform used for Video Visit: [facility]       NUTRITION POST OP APPOINTMENT  DATE OF VISIT: November 8, 2023    Ethan eWiss  1960  male  [id number]  62 year old     ASSESSMENT:    REASON FOR VISIT:  Ethan is a 62 year old year old male presents today for 9 year PO nutrition follow-up appointment. Patient is accompanied by self.    DIAGNOSIS:  Status post gastric bypass surgery.  Obesity Overweight BMI 25-29.9     ANTHROPOMETRICS:  Initial Weight: 318 lbs    Height: 6'1    Current Weight: 210 lbs 0 oz    BMI: Body mass index is 27.71 kg/m .    VITAMINS AND MINERALS:  1 Multivitamin with Minerals (Bariatric Pal One A Day; AM)  400 mg Calcium With Vitamin D (lunch, dinner, bedtime)    *MVT sufficient to meet Vitamin D, Iron and Thiamin needs.    *Pt not taking any additional Vitamin B12; if labs stable okay to continue without    NUTRITION HISTORY:  Breakfast: [wakes at 5am, eats at 6am] protein shake + decaf coffee + Fair Life milk  oatmeal  yogurt + granola   Lunch: [11:30am] shrimp cocktail  toast w/peanut butter and SF jam  Supper: [5:30pm] spaghetti  w/meat sauce  chili  chicken  shepherds pie  Snacks: handful of triscuits  peanut M&Ms  fun sized snickers bar  protein bar   Fluids consumed: water, decaf iced tea (unsweetened), protein shake (most mornings)  Consuming liquid calories: Yes  Protein intake: 60-90 grams/day  Tolerate regular texture food: Yes  Portion size: 1 cup or more  Take 20-30 minutes to consume each meal: No   Eat protein foods first: Yes  Fluids  and meals separate by at least 30 minutes: No  Chew foods thoroughly: Yes  Tolerating diet: Yes  Drinking high protein supplements: Yes  Consuming snacks per day: 4x/day   Additional Information: Pt feeling well and tolerating diet. He is on Wegovy with good results. He notices hunger about 1-2h after eating; reminded to include protein at all meals/snacks and to separate liquids and solids by full 30 minutes        PHYSICAL ACTIVITY:  Type: walking, frieda chi   Frequency (days per week): 7  Duration (min): 30-90 minutes    DIAGNOSIS:  Previous Nutrition Diagnosis: Altered gastrointestinal function related to alteration in gastrointestinal structure as evidenced by history of gastric bypass surgery.- no change    Previous goals:  Consistently eat 15-20 grams of protein at lunch - improving  Replace Matcha tea with decaf/ herbal tea or decaf skim latte - met  Replace snacks with a second protein drink (discussed other options for protein drinks)  - not met    Current Nutrition Diagnosis: Altered gastrointestinal function related to alteration in gastrointestinal structure as evidenced by history of gastric bypass surgery.    INTERVENTION:   Nutrition Prescription: Eat 3 meals a day at regular intervals. Consume 60-90 grams of protein daily. Follow post-surgical vitamin and mineral protocol.  Assessed learning needs and learning preferences.    GOALS:  Separate fluids from meals/snacks by full 30 minutes  Include protein at all meals and snacks    Follow-Up:   Recommend annual follow up visits to assist with lifestyle changes or per insurance.  Implementation: Discussed progress toward previous goals; reinforced importance of following bariatric lifestyle changes.    NUTRITION MONITORING AND EVALUATION:  Anticipated compliance: fair-good  Verbalized good understanding.    Follow up: Patient to follow up in 12 months.    TIME SPENT WITH PATIENT:  14 minutes        Sammie Carpio RD, LD  Clinical Dietitian

## 2023-11-09 ENCOUNTER — VIRTUAL VISIT (OUTPATIENT)
Dept: SURGERY | Facility: CLINIC | Age: 63
End: 2023-11-09
Payer: COMMERCIAL

## 2023-11-09 VITALS — WEIGHT: 210 LBS | BODY MASS INDEX: 27.83 KG/M2 | HEIGHT: 73 IN

## 2023-11-09 VITALS — BODY MASS INDEX: 27.83 KG/M2 | HEIGHT: 73 IN | WEIGHT: 210 LBS

## 2023-11-09 DIAGNOSIS — Z98.84 BARIATRIC SURGERY STATUS: ICD-10-CM

## 2023-11-09 DIAGNOSIS — K91.2 POSTSURGICAL MALABSORPTION: ICD-10-CM

## 2023-11-09 DIAGNOSIS — E66.3 OVERWEIGHT WITH BODY MASS INDEX (BMI) OF 27 TO 27.9 IN ADULT: Primary | ICD-10-CM

## 2023-11-09 DIAGNOSIS — K21.9 GERD WITHOUT ESOPHAGITIS: ICD-10-CM

## 2023-11-09 DIAGNOSIS — Z86.39 HISTORY OF OBESITY: ICD-10-CM

## 2023-11-09 PROCEDURE — 99214 OFFICE O/P EST MOD 30 MIN: CPT | Mod: VID | Performed by: PHYSICIAN ASSISTANT

## 2023-11-09 PROCEDURE — 97803 MED NUTRITION INDIV SUBSEQ: CPT | Mod: VID

## 2023-11-09 RX ORDER — SEMAGLUTIDE 2.4 MG/.75ML
2.4 INJECTION, SOLUTION SUBCUTANEOUS
Qty: 9 ML | Refills: 0 | Status: SHIPPED | OUTPATIENT
Start: 2023-11-09 | End: 2024-02-01

## 2023-11-09 NOTE — PATIENT INSTRUCTIONS
"To ensure the quality you may receive a patient satisfaction survey. The greatest compliment you can give is \"Likely to Recommend\"    Nice to talk with you today. Thank you for allowing me the privilege of caring for you. Below is the plan discussed.-  . Jann Arteaga PA-C    Plan:  Labs ordered. Call [phone] to schedule.  Continue your bariatric vitamins   RX for Wegovy 2.4 mg sent to pharmacy      FOLLOW-UP:  Call [phone] to schedule next visit.       Bariatric Post Op Guidelines  General:    To avoid marginal ulcers avoid all forms of tobacco, alcohol in excess, caffeine, and NSAIDS     Exercise is key for weight loss and weight maintenance. Aim for 30-60 minutes of physical activity most days.  Include cardiovascular and strength training.    Continue lifelong vitamins supplementation and annual lab follow up.  All  patients should supplement with the following bariatric postoperative vitamins:  2 Complete multivitamins with minerals (at different times than calcium)  Vitamin D 5000 Int Units/125 mg daily   Calcium 600 mg twice daily or 500 mg three times daily   Vitamin B12: 500 mcg sl daily or 1000 mcg Inj monthly  B complex daily or Thiamine 100 mg weekly  1 Iron/Vit C. Daily for females who menstruate and/or as directed    The bariatric team should be aware and evaluate all GI symptoms which can be a sign of complications. Inability to tolerate textured solid food (chicken, steak, fish) may need to be evaluated by endoscopy.    There is a 10% increase of Alcohol Use Disorder in patients with bariatric surgery.   Most often occurring around 2 years post op.  Call if you feel alcohol is interfering in your daily life.  We can help.     Follow up annually lifelong. Obesity is a chronic disease.  Weight gain can be expected. The goal of follow-up visits is to ensure adequate vitamin and protein absorption, evaluate food intake behavior, review exercise/activity level, and assist with weight " regain.    Nutritional:  Eat 3 meals per day  (No snacks between meals.)  Do not skip meals.  This can cause overeating at the next meal and will prevent adequate protein and nutritional intake.    Aim for 60-80 grams of protein per day.  Always eat your protein first. This assists with optimal nutrition and helps you stay full longer.    Eat your protein first, and then follow with fiber.    Add fiber by including fruits, vegetables, whole grains, and beans.     Portions should be about 1 cup per meal. Use measuring cups to be accurate.  Continue to use saucer/salad plates, infant/toddler silverware to keep portion sizes small and take small bites.    Eat S-L-O-W-L-Y to make each meal last 20-30 minutes. Always stop eating when satisfied.    Aim for 64 oz. of calorie-free fluids daily.    Avoid drinking 30 min before, during, and 30 min after meal    Avoid high sugar and high fat foods to prevent high calorie intake. This will reduce your rate of weight loss and can cause weight regain.   Check nutrition labels for less than 10 grams of sugar and less than 10 grams of fat per serving.

## 2023-11-09 NOTE — PATIENT INSTRUCTIONS
Leroy Long!      It was great chatting with you again today! Here's a summary of the goals we discussed:    1. Separate liquids from solids  - Avoid sipping/drinking during ALL meals and snacks, as well as for 30 minutes after  - This will help you stay valdivia, for longer    2. Include protein at all meals and snacks  - This should also help with lasting fullness  - Quick protein ideas: hard boiled eggs, low-fat cheese sticks, lean beef or turkey sticks, protein bars/shakes, cottage cheese, yogurt        Plan on following up in 12 months. This can be scheduled via our call center at . Reach out sooner with any questions or concerns. Have a great day!      Sammie Carpio RD, LD  Clinical Dietitian

## 2023-11-09 NOTE — ASSESSMENT & PLAN NOTE
Congratulated patient on overall weight loss using lifestyle, diet and medications in addition to surgery.   Initial BMI:40.28  Current BMI: 27.71    Initial Weight (lbs): 318 lbs  Weight: 210 lb (95.3 kg)  Last Visits Weight: 220 lb (99.8 kg)  Cumulative weight loss (lbs): 108  Weight Loss Percentage: 33.96%

## 2023-11-13 DIAGNOSIS — R05.9 COUGH: ICD-10-CM

## 2023-11-13 DIAGNOSIS — K21.00 GASTROESOPHAGEAL REFLUX DISEASE WITH ESOPHAGITIS WITHOUT HEMORRHAGE: ICD-10-CM

## 2023-11-21 ENCOUNTER — LAB (OUTPATIENT)
Dept: LAB | Facility: CLINIC | Age: 63
End: 2023-11-21
Payer: COMMERCIAL

## 2023-11-21 DIAGNOSIS — K91.2 POSTSURGICAL MALABSORPTION: ICD-10-CM

## 2023-11-21 DIAGNOSIS — Z98.84 BARIATRIC SURGERY STATUS: ICD-10-CM

## 2023-11-21 LAB
FERRITIN SERPL-MCNC: 66 NG/ML (ref 31–409)
IRON BINDING CAPACITY (ROCHE): 301 UG/DL (ref 240–430)
IRON SATN MFR SERPL: 45 % (ref 15–46)
IRON SERPL-MCNC: 135 UG/DL (ref 61–157)
PTH-INTACT SERPL-MCNC: 22 PG/ML (ref 15–65)
VIT B12 SERPL-MCNC: 1171 PG/ML (ref 232–1245)
VIT D+METAB SERPL-MCNC: 66 NG/ML (ref 20–50)

## 2023-11-21 PROCEDURE — 84590 ASSAY OF VITAMIN A: CPT | Mod: 90

## 2023-11-21 PROCEDURE — 83970 ASSAY OF PARATHORMONE: CPT

## 2023-11-21 PROCEDURE — 83550 IRON BINDING TEST: CPT

## 2023-11-21 PROCEDURE — 82607 VITAMIN B-12: CPT

## 2023-11-21 PROCEDURE — 36415 COLL VENOUS BLD VENIPUNCTURE: CPT

## 2023-11-21 PROCEDURE — 82728 ASSAY OF FERRITIN: CPT

## 2023-11-21 PROCEDURE — 99000 SPECIMEN HANDLING OFFICE-LAB: CPT

## 2023-11-21 PROCEDURE — 82306 VITAMIN D 25 HYDROXY: CPT

## 2023-11-21 PROCEDURE — 83540 ASSAY OF IRON: CPT

## 2023-11-24 LAB
ANNOTATION COMMENT IMP: NORMAL
RETINYL PALMITATE SERPL-MCNC: <0.02 MG/L
VIT A SERPL-MCNC: 0.7 MG/L

## 2023-11-27 DIAGNOSIS — K91.2 POSTSURGICAL MALABSORPTION: ICD-10-CM

## 2023-11-27 DIAGNOSIS — Z98.84 BARIATRIC SURGERY STATUS: Primary | ICD-10-CM

## 2024-01-31 DIAGNOSIS — E66.3 OVERWEIGHT WITH BODY MASS INDEX (BMI) OF 27 TO 27.9 IN ADULT: ICD-10-CM

## 2024-02-01 ENCOUNTER — TELEPHONE (OUTPATIENT)
Dept: SURGERY | Facility: CLINIC | Age: 64
End: 2024-02-01
Payer: COMMERCIAL

## 2024-02-01 RX ORDER — SEMAGLUTIDE 2.4 MG/.75ML
INJECTION, SOLUTION SUBCUTANEOUS
Qty: 3 ML | Refills: 0 | Status: SHIPPED | OUTPATIENT
Start: 2024-02-01 | End: 2024-03-04

## 2024-02-01 NOTE — TELEPHONE ENCOUNTER
Prior Authorization Retail Medication Request    Medication/Dose:    Disp Refills Start End IAN   Semaglutide-Weight Management (WEGOVY) 2.4 MG/0.75ML pen 9 mL 0 11/9/2023 -- No   Sig - Route: Inject 2.4 mg Subcutaneous every 7 days     ICD code (if different than what is on RX):  [E66.3, Z68.27]   Previously Tried and Failed:  diet and exercise for at least 3 months without sustainable weight loss. Topiramate: ineffective  Rationale:  Ethan Weiss is a 63 year old male. Currently elevated systolic blood pressure therefore phentermine may not be appropriate.      Wegovy was started February 2023 at initial weight of 237 lb. Patient has lost to current weight of 205.4 lb on Wegovy 2.4 mg weekly. Ethan has lost 31.6 lb, or greater than 13% of body weight from baseline    Requesting continued coverage of Wegovy at 2.4 mg weekly    Insurance Name:  [facility] [facility]   Insurance ID:  [id number]       Pharmacy Information (if different than what is on RX)  Name:  [facility] - [facility] [address] [place] [address]   Phone:  [phone]

## 2024-02-14 NOTE — TELEPHONE ENCOUNTER
PA Initiation    Medication: WEGOVY 2.4 MG/0.75ML SC SOAJ  Insurance Company: [employer] Non-Specialty PA's - Phone [phone] Fax [phone]  Pharmacy Filling the Rx: [facility] - [address]  Filling Pharmacy Phone: [phone]  Filling Pharmacy Fax: [phone]  Start Date: 2/14/2024

## 2024-02-14 NOTE — TELEPHONE ENCOUNTER
Prior Authorization Approval    Medication: WEGOVY 2.4 MG/0.75ML SC SOAJ  Authorization Effective Date: 1/15/2024  Authorization Expiration Date: 2/13/2025  Approved Dose/Quantity:   Reference #:     Insurance Company: [employer] Non-Specialty PA's - Phone [phone] Fax [phone]  Expected CoPay: $    CoPay Card Available:      Financial Assistance Needed:   Which Pharmacy is filling the prescription: [facility] - [address]  Pharmacy Notified: YES  Patient Notified: **Instructed pharmacy to notify patient when script is ready to /ship.**

## 2024-03-04 ENCOUNTER — VIRTUAL VISIT (OUTPATIENT)
Dept: CARDIOLOGY | Facility: CLINIC | Age: 64
End: 2024-03-04
Attending: NURSE PRACTITIONER
Payer: COMMERCIAL

## 2024-03-04 ENCOUNTER — TELEPHONE (OUTPATIENT)
Dept: SURGERY | Facility: CLINIC | Age: 64
End: 2024-03-04
Payer: COMMERCIAL

## 2024-03-04 VITALS — BODY MASS INDEX: 26.65 KG/M2 | WEIGHT: 202 LBS

## 2024-03-04 DIAGNOSIS — E66.3 OVERWEIGHT WITH BODY MASS INDEX (BMI) OF 27 TO 27.9 IN ADULT: ICD-10-CM

## 2024-03-04 DIAGNOSIS — Z98.84 GASTRIC BYPASS STATUS FOR OBESITY: Primary | ICD-10-CM

## 2024-03-04 DIAGNOSIS — L30.9 ECZEMA, UNSPECIFIED TYPE: ICD-10-CM

## 2024-03-04 DIAGNOSIS — Z98.84 HISTORY OF ROUX-EN-Y GASTRIC BYPASS: Primary | ICD-10-CM

## 2024-03-04 DIAGNOSIS — K21.9 GERD WITHOUT ESOPHAGITIS: ICD-10-CM

## 2024-03-04 RX ORDER — LANOLIN ALCOHOL/MO/W.PET/CERES
100 CREAM (GRAM) TOPICAL WEEKLY
COMMUNITY
End: 2024-03-04

## 2024-03-04 RX ORDER — SEMAGLUTIDE 2.4 MG/.75ML
2.4 INJECTION, SOLUTION SUBCUTANEOUS WEEKLY
Qty: 9 ML | Refills: 1 | Status: SHIPPED | OUTPATIENT
Start: 2024-03-04 | End: 2024-07-18

## 2024-03-04 ASSESSMENT — PAIN SCALES - GENERAL: PAINLEVEL: NO PAIN (0)

## 2024-03-04 NOTE — PROGRESS NOTES
"Virtual Visit Details    Type of service:  Video Visit     Originating Location (pt. Location): {video visit patient location:359399::\"Home\"}  {PROVIDER LOCATION On-site should be selected for visits conducted from your clinic location or adjoining [facility] hospital, academic office, or other nearby [facility] building. Off-site should be selected for all other provider locations, including home:521404}  Distant Location (provider location):  {virtual location provider:330662}  Platform used for Video Visit: {Virtual Visit Platforms:463158::\"Gobble\"}  "

## 2024-03-04 NOTE — PATIENT INSTRUCTIONS
"Recommendations from today's MTM visit:                                                       Continue Wegovy 2.4 mg weekly, will give 3 month supply at a time  Change from regular multivitamin to CelebrateOne Multi with 18 mg iron  Can then also stop weekly thiamine  [email or web address]  Decrease calcium/vitamin D petites to 2 petites twice daily   Schedule follow up with LISA Snyder for 2 months from now.     Follow-up: Return in about 6 months (around 9/4/2024) for Medication Therapy Management Pharmacist Visit, Call [phone] to schedule.    It was great speaking with you today.  I value your experience and would be very thankful for your time in providing feedback in our clinic survey. In the next few days, you may receive an email or text message from [employer] with a link to a survey related to your  clinical pharmacist.\"     To schedule another MTM appointment, please call the clinic directly or you may call the MTM scheduling line at [phone] or toll-free at [phone].     My Clinical Pharmacist's contact information:                                                      Please feel free to contact me with any questions or concerns you have.      Lauren Bloch, PharmD, BCACP   Medication Therapy Management Pharmacist   [facility]       "

## 2024-03-04 NOTE — PROGRESS NOTES
Medication Therapy Management (MTM) Encounter    ASSESSMENT:                            Medication Adherence/Access: No issues identified    Weight Management:   Progressing well, to continue at Wegovy 2.4 mg once weekly.     GERD:   Stable.     Renea-en-Y Gastric Bypass:   Would benefit from switching to Celebrate One Multi Capsule with 18 mg iron daily. Celebrate has 500 mcg B12 versus BariatricPal having 1000 mcg. Can also decrease calcium/vitamin D to 2 tablets twice daily due to amount of calcium patient is getting from diet, and will ensure vitamin D stays within normal limits with shifting back to Bariatric vitamin as vitamin D the same in Celebrate and Bariatric Pal (this will still give ~9875-1578 mg calcium/day per guidelines).  Can stop thiamine with being on Celebrate vitamin as getting in 12 mg thiamine daily which is recommended ASMBS micronutrient guideline recommendations so weekly extra thiamine not necessary.      Eczema:   Stable.     PLAN:                            Continue Wegovy 2.4 mg weekly, will give 3 month supply at a time  Change from regular multivitamin to CelebrateOne Multi with 18 mg iron  Can then also stop weekly thiamine  Decrease calcium/vitamin D petites to 2 petites twice daily   Schedule follow up with LISA Snyder for 2 months from now.     Follow-up: Return in about 6 months (around 9/4/2024) for Medication Therapy Management Pharmacist Visit, Call [phone] to schedule.    SUBJECTIVE/OBJECTIVE:                          Ethan Weiss is a 63 year old male contacted via secure video for a follow-up visit from 9/18/2023.       Reason for visit: Wegovy check in.    Allergies/ADRs: Reviewed in chart  Past Medical History: Reviewed in chart  Tobacco: He reports that he has never smoked. He has never used smokeless tobacco.  Alcohol: not currently using  Caffeine: None.     Medication Adherence/Access: no issues reported    Obesity   Weight Management:   Wegovy 2.4 mg once  "weekly   Patient reports no current medication side effects other than belching randomly. No longer having constipation with use of Miralax 1 capful daily. He is still losing weight.   Nutrition/Eating Habits: he is getting in 3 meals per day + snacks. Describes portions sizes as smaller. He is satisfied earlier. Breakfast: protein shake. Dinner: \"50% protein\". Getting in < 30 oz plain water daily.   Exercise/Activity: walking 30 minutes two times per day, then 3-4 days per week an additional 1 hour walk.      Wt Readings from Last 4 Encounters:   03/04/24 91.6 kg (202 lb)   11/09/23 95.3 kg (210 lb)   11/09/23 95.3 kg (210 lb)   09/18/23 97.5 kg (215 lb)     Estimated body mass index is 26.65 kg/m  as calculated from the following:    Height as of 11/9/23: 1.854 m (6' 1\").    Weight as of this encounter: 91.6 kg (202 lb).    GERD:   Omeprazole 20 mg once daily   Patient reports belching and eructation randomly, but not bothersome.    Patient feels that current regimen is effective.  The patient does not have a history of GI bleed.    History Renea-en-Y Gastric Bypass:   Multivitamin 1 tablet daily   Calcium-Vitamin D petite: 2 petites three times daily   Thiamine 100 mg weekly    Patient had gastric bypass completed in 2013.  He does not have complaints of acid reflux. Hedoes not have issues of swallowing food/pills. History of reactive hypoglycemia since surgery? No. Would prefer to be on bariatric multivitamin if he can. He knows his vitamin B12 was on higher end before with Bariatric multivitamin. Reports he changed from BariatricPal to regular multivitamin due to higher B12 and vitamin D last time. He is consuming ~1 cup or more of Fairlife skim milk daily or more, at least 400 mg calcium per day in diet.     Hemoglobin   Date Value Ref Range Status   09/11/2023 14.5 13.4 - 17.5 g/dl Final     Ferritin   Date Value Ref Range Status   11/21/2023 66 31 - 409 ng/mL Final   11/18/2020 46 26 - 388 ng/mL Final     Lab " Results   Component Value Date    VITDT 66 (H) 11/21/2023      Lab Results   Component Value Date    PTHI 22 11/21/2023      Lab Results   Component Value Date    B12 1,171 11/21/2023      Lab Results   Component Value Date    MATEUSZ 0.70 11/21/2023     Eczema:   Triamcinolone 0.1%   Uses as needed for eczema sites and works well. No concerns.     Today's Vitals: Wt 91.6 kg (202 lb)   BMI 26.65 kg/m    ----------------    I spent 20 minutes with this patient today. All changes were made via collaborative practice agreement with LISA Snyder. A copy of the visit note was provided to the patient's provider(s).    A summary of these recommendations was sent via [employer].    Lauren Bloch, PharmD, BCACP   Medication Therapy Management Pharmacist   [facility]    Telemedicine Visit Details  Type of service:  Video Conference via [employer]  Start Time:  8:35 AM  End Time:  8:55 AM     Medication Therapy Recommendations  History of Renea-en-Y gastric bypass    Current Medication: calcium citrate-vitamin D (CITRACAL PETITES/VITAMIN D) 200-6.25 MG-MCG TABS per tablet   Rationale: Dose too high - Dosage too high - Safety   Recommendation: Decrease Dose   Status: Accepted - no CPA Needed          Current Medication: thiamine (B-1) 100 MG tablet (Discontinued)   Rationale: No medical indication at this time - Unnecessary medication therapy - Indication   Recommendation: Discontinue Medication   Status: Accepted - no CPA Needed          Rationale: More effective medication available - Ineffective medication - Effectiveness   Recommendation: Change Medication - CELEBRATE MULTI-COMPLETE 18 PO   Status: Accepted - no CPA Needed

## 2024-03-04 NOTE — LETTER
3/4/2024      RE: Ethan Weiss  [address]  [facility] [address]       Dear Colleague,    Thank you for the opportunity to participate in the care of your patient, Ethan Weiss, at the [facility] [place] at [facility]. Please see a copy of my visit note below.    Medication Therapy Management (MTM) Encounter    ASSESSMENT:                            Medication Adherence/Access: No issues identified    Weight Management:   Progressing well, to continue at Wegovy 2.4 mg once weekly.     GERD:   Stable.     Renea-en-Y Gastric Bypass:   Would benefit from switching to Celebrate One Multi Capsule with 18 mg iron daily. Celebrate has 500 mcg B12 versus BariatricPal having 1000 mcg. Can also decrease calcium/vitamin D to 2 tablets twice daily due to amount of calcium patient is getting from diet, and will ensure vitamin D stays within normal limits with shifting back to Bariatric vitamin as vitamin D the same in Celebrate and Bariatric Pal (this will still give ~0719-8374 mg calcium/day per guidelines).  Can stop thiamine with being on Celebrate vitamin as getting in 12 mg thiamine daily which is recommended ASMBS micronutrient guideline recommendations so weekly extra thiamine not necessary.      Eczema:   Stable.     PLAN:                            Continue Wegovy 2.4 mg weekly, will give 3 month supply at a time  Change from regular multivitamin to CelebrateOne Multi with 18 mg iron  Can then also stop weekly thiamine  Decrease calcium/vitamin D petites to 2 petites twice daily   Schedule follow up with LISA Snyder for 2 months from now.     Follow-up: Return in about 6 months (around 9/4/2024) for Medication Therapy Management Pharmacist Visit, Call [phone] to schedule.    SUBJECTIVE/OBJECTIVE:                          Ethan Weiss is a 63 year old male contacted via secure video for a follow-up visit from 9/18/2023.   "     Reason for visit: Wegovy check in.    Allergies/ADRs: Reviewed in chart  Past Medical History: Reviewed in chart  Tobacco: He reports that he has never smoked. He has never used smokeless tobacco.  Alcohol: not currently using  Caffeine: None.     Medication Adherence/Access: no issues reported    Obesity  Weight Management:   Wegovy 2.4 mg once weekly   Patient reports no current medication side effects other than belching randomly. No longer having constipation with use of Miralax 1 capful daily. He is still losing weight.   Nutrition/Eating Habits: he is getting in 3 meals per day + snacks. Describes portions sizes as smaller. He is satisfied earlier. Breakfast: protein shake. Dinner: \"50% protein\". Getting in < 30 oz plain water daily.   Exercise/Activity: walking 30 minutes two times per day, then 3-4 days per week an additional 1 hour walk.      Wt Readings from Last 4 Encounters:   03/04/24 91.6 kg (202 lb)   11/09/23 95.3 kg (210 lb)   11/09/23 95.3 kg (210 lb)   09/18/23 97.5 kg (215 lb)     Estimated body mass index is 26.65 kg/m  as calculated from the following:    Height as of 11/9/23: 1.854 m (6' 1\").    Weight as of this encounter: 91.6 kg (202 lb).    GERD:   Omeprazole 20 mg once daily   Patient reports belching and eructation randomly, but not bothersome.    Patient feels that current regimen is effective.  The patient does not have a history of GI bleed.    History Renea-en-Y Gastric Bypass:   Multivitamin 1 tablet daily   Calcium-Vitamin D petite: 2 petites three times daily   Thiamine 100 mg weekly    Patient had gastric bypass completed in 2013.  He does not have complaints of acid reflux. Hedoes not have issues of swallowing food/pills. History of reactive hypoglycemia since surgery? No. Would prefer to be on bariatric multivitamin if he can. He knows his vitamin B12 was on higher end before with Bariatric multivitamin. Reports he changed from BariatricPal to regular multivitamin due to " higher B12 and vitamin D last time. He is consuming ~1 cup or more of Fairlife skim milk daily or more, at least 400 mg calcium per day in diet.     Hemoglobin   Date Value Ref Range Status   09/11/2023 14.5 13.4 - 17.5 g/dl Final     Ferritin   Date Value Ref Range Status   11/21/2023 66 31 - 409 ng/mL Final   11/18/2020 46 26 - 388 ng/mL Final     Lab Results   Component Value Date    VITDT 66 (H) 11/21/2023      Lab Results   Component Value Date    PTHI 22 11/21/2023      Lab Results   Component Value Date    B12 1,171 11/21/2023      Lab Results   Component Value Date    MATEUSZ 0.70 11/21/2023     Eczema:   Triamcinolone 0.1%   Uses as needed for eczema sites and works well. No concerns.     Today's Vitals: Wt 91.6 kg (202 lb)   BMI 26.65 kg/m    ----------------    I spent 20 minutes with this patient today. All changes were made via collaborative practice agreement with LISA Snyder. A copy of the visit note was provided to the patient's provider(s).    A summary of these recommendations was sent via [employer].    Lauren Bloch, PharmD, BCACP   Medication Therapy Management Pharmacist   [facility]    Telemedicine Visit Details  Type of service:  Video Conference via [employer]  Start Time:  8:35 AM  End Time:  8:55 AM     Medication Therapy Recommendations  History of Renea-en-Y gastric bypass    Current Medication: calcium citrate-vitamin D (CITRACAL PETITES/VITAMIN D) 200-6.25 MG-MCG TABS per tablet   Rationale: Dose too high - Dosage too high - Safety   Recommendation: Decrease Dose   Status: Accepted - no CPA Needed          Current Medication: thiamine (B-1) 100 MG tablet (Discontinued)   Rationale: No medical indication at this time - Unnecessary medication therapy - Indication   Recommendation: Discontinue Medication   Status: Accepted - no CPA Needed          Rationale: More effective medication available - Ineffective medication - Effectiveness    Recommendation: Change Medication - CELEBRATE MULTI-COMPLETE 18 PO   Status: Accepted - no CPA Needed                Please do not hesitate to contact me if you have any questions/concerns.     Sincerely,     Lauren T. Bloch, RPH

## 2024-03-04 NOTE — TELEPHONE ENCOUNTER
MTM referral placed due to [facility] Medication Therapy Management (MTM) practice shift. CPA with Jann Arteaga PA-C.

## 2024-03-04 NOTE — Clinical Note
GWEN adjusted RYGB vitamins as he wanted to go back to bariatric vitamin as didn't like the regular vitamin and taking thiamine. Recommended Celebrate One Multi with 18 mg iron. Will have lower vitamin B12 than the bariatric vitamin before. He also is getting at least 400 mg calcium in diet every day regimened, so told min he can decrease calcium to twice daily. Also weekly thiamine not necessary as back on bariatric vitamin which has the 12 mg daily. Thanks! Tosha KRISHNAN

## 2024-03-04 NOTE — NURSING NOTE
Is the patient currently in the state of [place]? YES    Visit mode:VIDEO    If the visit is dropped, the patient can be reconnected by: VIDEO VISIT: Text to cell phone:   Telephone Information:   Mobile [phone]   Mobile [phone]    and VIDEO VISIT: Send to e-mail at: [email or web address]    Will anyone else be joining the visit? NO  (If patient encounters technical issues they should call [phone]    How would you like to obtain your AVS? MyChart    Are changes needed to the allergy or medication list? No    Reason for visit: ZANA LAWSON

## 2024-06-17 PROBLEM — Z71.89 ADVANCED DIRECTIVES, COUNSELING/DISCUSSION: Status: RESOLVED | Noted: 2018-07-23 | Resolved: 2024-06-17

## 2024-07-08 NOTE — PROGRESS NOTES
"Ethan is a 63 year old who is being evaluated via a billable video visit.      The patient has been notified of following:     \"This video visit will be conducted via a call between you and your physician/provider. We have found that certain health care needs can be provided without the need for an in-person physical exam.  This service lets us provide the care you need with a video conversation.  If a prescription is necessary we can send it directly to your pharmacy.  If lab work is needed we can place an order for that and you can then stop by our lab to have the test done at a later time.    Video visits are billed at different rates depending on your insurance coverage.  Please reach out to your insurance provider with any questions.    If during the course of the call the physician/provider feels a video visit is not appropriate, you will not be charged for this service.\"    Patient has given verbal consent for Video visit? Yes    How would you like to obtain your AVS? MyChart    If the video visit is dropped, the invitation should be resent by: My Chart    Will anyone else be joining your video visit? No    I    Video-Visit Details    Type of service:  Video Visit    Originating Location (pt. Location): Home    Distant Location (provider location):   Off-Site - Provider Home Office    Platform used for Video Visit: WePay    Video Start Time:  9:04    Video End Time: 9:18 AM        Return Medical Weight Management Note         Ethan Weiss  MRN:  [id number]  :  1960  MEL:  2024        Dear Cuauhtemoc Arredondo MD,    I had the pleasure of seeing your patient Ethan Weiss. He is a 63 year old male who I am continuing to see for treatment of obesity      Assessment   Problem List Items Addressed This Visit       Bariatric surgery status    Relevant Orders    CBC with platelets    Vitamin B12    Vitamin D Screen    Parathyroid Hormone Intact    Iron    Iron and Iron Binding Capacity    " Ferritin    Postsurgical malabsorption    Relevant Orders    CBC with platelets    Vitamin B12    Vitamin D Screen    Parathyroid Hormone Intact    Iron    Iron and Iron Binding Capacity    Ferritin    Vitamin A    Overweight with body mass index (BMI) of 26 to 26.9 in adult - Primary     Continue wegovy         Relevant Medications    Semaglutide-Weight Management (WEGOVY) 2.4 MG/0.75ML pen    GERD without esophagitis     Other Visit Diagnoses       Screening for endocrine, nutritional, metabolic and immunity disorder        Relevant Orders    Comprehensive metabolic panel    Overweight with body mass index (BMI) of 27 to 27.9 in adult        Relevant Medications    Semaglutide-Weight Management (WEGOVY) 2.4 MG/0.75ML pen               PLAN/DISCUSSION:  Congratulated patient on overall weight loss and lifestyle changes!  Rx for wegovy 2.4 mg sent over. Ordered bariatric labs as next visit is annual.        FOLLOW-UP:  annual with provider and diet in November      SUBJECTIVE/OBJECTIVE:  SP gastric bypass patient seen for medication follow up. Has also been followed by MTM PharmD. Is on wegovy 2.4 mg. Very happy with weight loss. Is very close to weight when he was 19 years old. Would be very happy if he could break the 200 lbs marker.   Overall tolerating well. Continues with miralax and has a muesli/yogurt mix for breakfast that helps with constipation   No other side effects.  Has been taking omeprazole for a while given to him for a chronic cough - thought might be related to GERD    Planning on retiring end of December 2024    Anti-obesity medications:   Current: wegovy 2.4 mg  Side effects:  Denies nausea, vomiting, abdominal pain, severe constipation, diarrhea, or heartburn      Recent diet changes: b: mueslix mix with yogurt  L: whole wheat toast with PB  D: spaghetti with ground beef  Goes to [employer] every other day to help support wife's need to have more calories  Getting in about 40 grams of protein  "daily  Fluids: closer to 60 oz daily    Recent exercise/activity changes: walks dogs regularly. Pilates and yoga 5 days weekly.  Does workout with dumbbells about 3 times weekly.     Recent stressors: low      CURRENT WEIGHT:   202 lbs 0 oz    Initial Weight (lbs): 318 lbs  Last Visits Weight: 210 lb (95.3 kg)  Cumulative weight loss (lbs): 116  Weight Loss Percentage: 36.48%      MEDICATIONS:   Current Outpatient Medications   Medication Sig Dispense Refill    calcium citrate-vitamin D (CITRACAL PETITES/VITAMIN D) 200-6.25 MG-MCG TABS per tablet Take 2 tablets by mouth 2 times daily      Multiple Vitamins-Minerals (CELEBRATE MULTI-COMPLETE 18) CAPS Take 1 capsule by mouth daily      omeprazole (PRILOSEC) 20 MG DR capsule TAKE 1 CAPSULE DAILY 90 capsule 3    polyethylene glycol (MIRALAX) 17 GM/Dose powder Take 17 g (1 Capful) by mouth daily 578 g 11    Semaglutide-Weight Management (WEGOVY) 2.4 MG/0.75ML pen Inject 2.4 mg subcutaneously once a week 9 mL 0    triamcinolone (KENALOG) 0.1 % external ointment Apply topically 2 times daily as needed (eczema arms and legs) Ears, arms, legs 60 g 1         ROS:  General  Fatigue: No  Sleep Quality: OK      PHYSICAL EXAM:  Objective    Ht 6' 1\" (1.854 m)   Wt 202 lb (91.6 kg)   BMI 26.65 kg/m    GENERAL: Healthy, alert and no distress  EYES: Eyes grossly normal to inspection.  No discharge or erythema, or obvious scleral/conjunctival abnormalities.  RESP: No audible wheeze, cough, or visible cyanosis.  No visible retractions or increased work of breathing.    SKIN: Visible skin clear. No significant rash, abnormal pigmentation or lesions.  NEURO: Cranial nerves grossly intact.  Mentation and speech appropriate for age.  PSYCH: Mentation appears normal, affect normal/bright, judgement and insight intact, normal speech and appearance well-groomed.        Sincerely,    Jann Arteaga PA-C    20 minutes spent on the date of the encounter doing chart review, review of test " results, patient visit and documentation

## 2024-07-18 ENCOUNTER — VIRTUAL VISIT (OUTPATIENT)
Dept: SURGERY | Facility: CLINIC | Age: 64
End: 2024-07-18
Payer: COMMERCIAL

## 2024-07-18 VITALS — BODY MASS INDEX: 26.77 KG/M2 | HEIGHT: 73 IN | WEIGHT: 202 LBS

## 2024-07-18 DIAGNOSIS — E66.3 OVERWEIGHT WITH BODY MASS INDEX (BMI) OF 26 TO 26.9 IN ADULT: Primary | ICD-10-CM

## 2024-07-18 DIAGNOSIS — Z13.228 SCREENING FOR ENDOCRINE, NUTRITIONAL, METABOLIC AND IMMUNITY DISORDER: ICD-10-CM

## 2024-07-18 DIAGNOSIS — K91.2 POSTSURGICAL MALABSORPTION: ICD-10-CM

## 2024-07-18 DIAGNOSIS — E66.3 OVERWEIGHT WITH BODY MASS INDEX (BMI) OF 27 TO 27.9 IN ADULT: ICD-10-CM

## 2024-07-18 DIAGNOSIS — Z13.0 SCREENING FOR ENDOCRINE, NUTRITIONAL, METABOLIC AND IMMUNITY DISORDER: ICD-10-CM

## 2024-07-18 DIAGNOSIS — Z98.84 BARIATRIC SURGERY STATUS: ICD-10-CM

## 2024-07-18 DIAGNOSIS — Z13.29 SCREENING FOR ENDOCRINE, NUTRITIONAL, METABOLIC AND IMMUNITY DISORDER: ICD-10-CM

## 2024-07-18 DIAGNOSIS — Z13.21 SCREENING FOR ENDOCRINE, NUTRITIONAL, METABOLIC AND IMMUNITY DISORDER: ICD-10-CM

## 2024-07-18 DIAGNOSIS — K21.9 GERD WITHOUT ESOPHAGITIS: ICD-10-CM

## 2024-07-18 PROCEDURE — 99213 OFFICE O/P EST LOW 20 MIN: CPT | Mod: 95 | Performed by: PHYSICIAN ASSISTANT

## 2024-07-18 RX ORDER — SEMAGLUTIDE 2.4 MG/.75ML
2.4 INJECTION, SOLUTION SUBCUTANEOUS WEEKLY
Qty: 9 ML | Refills: 0 | Status: SHIPPED | OUTPATIENT
Start: 2024-07-18

## 2024-07-18 NOTE — PATIENT INSTRUCTIONS
"Nice to talk with you today! Thank you for allowing me the privilege of caring for you.   We hope we provided you with the excellent service you deserve.     To ensure the quality of our services you may receive a patient satisfaction survey from an independent monitoring company.  The greatest compliment you can give is \"Likely to Recommend\"      Below is our plan we discussed.-  MARGO Forte      Continue with wegovy  Get labs end of September    Scheduled a bariatric annual follow up with Jann Arteaga PA-C in November.  If you need to reach me sooner you can do so by calling [phone].    Have a great day!     Eat Better ? Move More ? Live Well    Eat 3 nutrient-rich meals each day    Don t skip meals--it will cause you to overeat later in the day!    Eating fiber (vegetables/fruits/whole grains) and protein with meals helps you stay full longer    Choose foods with less than 10 grams of sugar and 5 grams of fat per serving to prevent excess calories and weight re-gain  Eat around the same times each day to develop a routine eating schedule   Avoid snacking unless physically hungry.   Planned snacks: 1-2 times per day and no more than 150 calories    Eat protein first   Protein helps with healing, maintaining adequate muscle mass, reducing hunger and optimizing nutritional status   Aim for 60-80 grams of protein per day   Fill up on Fiber   Fiber comes from plants--fruits, veggies, whole grains, nuts/seeds and beans   Fiber is low in calories, high in phytonutrients and helps you stay full longer   Aim for 25-35 grams per day by eating fiber with meals and snacks  Eat S-[id number]   Take 20-30 minutes to eat each meal by taking small bites, chewing foods to applesauce consistency or 20-30 times before you swallow   Eating foods too fast can delay satiety/fullness signals and increase overeating   Slow down your eating by using toddler utensils, putting your fork/spoon down between bites and not watching TV or " emailing during meals!   Keep a Journal         Writing down what you eat, how you feel and when you are active helps you identify new changes to work on from week to week         Look for ways to cut 100 calories from your current diet 2-3 times per day  Drink 64 ounces of 0-Calorie drinks between meals   Water   Zero calorie [employer]el  or Vitamin Water     [employer]  Zero Calories   Crystal Light , Sugar-Free Chris-Aid , and other sugar-free lemonade or flavored wells   Keep Caffeine to less than 300mg per day ie: 3-6oz cups coffee     Work up to 45-60 minutes of physical activity most days of the week   Helps with losing weight and prevent regaining those extra pounds!    Do a combo of cardio (walking/water exercises) and strength training (lifting weights/Vinyasa yoga)    Avoid Mindless Eating   Be present when you eat--take note of the smell, taste and quality of your food   Make a list of alternative activities you could do to prevent eating out of boredom/stress  Go for a walk, call a friend, chew gum, paint your nails, re-organize the garage, etc

## 2024-07-19 DIAGNOSIS — K59.00 CONSTIPATION, UNSPECIFIED CONSTIPATION TYPE: ICD-10-CM

## 2024-07-22 RX ORDER — POLYETHYLENE GLYCOL 3350 17 G/17G
POWDER, FOR SOLUTION ORAL
Qty: 510 G | Refills: 11 | Status: SHIPPED | OUTPATIENT
Start: 2024-07-22

## 2024-07-22 NOTE — TELEPHONE ENCOUNTER
Per pt msg, still taking and finding a benefit.    Would like to continue.    Will refill. Next appt 9/9 with LB. Next appt 11/14 with RF.    Claudia GUERRERO RN

## 2024-08-06 ENCOUNTER — TELEPHONE (OUTPATIENT)
Dept: CARDIOLOGY | Facility: CLINIC | Age: 64
End: 2024-08-06
Payer: COMMERCIAL

## 2024-08-06 NOTE — TELEPHONE ENCOUNTER
Left Voicemail (1st Attempt) and Sent Mychart (1st Attempt) for the patient to call back and schedule the following:    Appointment type: RET MTM  Provider: Lauren Bloch,RPH  Return date: 9/9/24  Specialty phone number: [phone]  Additional appointment(s) needed: n/a  Additonal Notes: Provider unavailable, pt needs to reschedule

## 2024-08-21 ENCOUNTER — TELEPHONE (OUTPATIENT)
Dept: CARDIOLOGY | Facility: CLINIC | Age: 64
End: 2024-08-21
Payer: COMMERCIAL

## 2024-08-21 NOTE — TELEPHONE ENCOUNTER
Patient confirmed scheduled appointment:  Date: 09/09/2024  Time: 330 pm   Visit type: RET MTM   Provider: Lauren Bloch,RPH  Location: telephone  Testing/imaging: n/a  Additional notes: Provider unavailable, pt declined to rescheduled, pt stated appt no longer needed

## 2024-09-12 ENCOUNTER — OFFICE VISIT (OUTPATIENT)
Dept: FAMILY MEDICINE | Facility: CLINIC | Age: 64
End: 2024-09-12

## 2024-09-12 ENCOUNTER — ORDERS ONLY (AUTO-RELEASED) (OUTPATIENT)
Dept: FAMILY MEDICINE | Facility: CLINIC | Age: 64
End: 2024-09-12

## 2024-09-12 VITALS
BODY MASS INDEX: 26.44 KG/M2 | SYSTOLIC BLOOD PRESSURE: 127 MMHG | HEIGHT: 74 IN | WEIGHT: 206 LBS | HEART RATE: 69 BPM | OXYGEN SATURATION: 100 % | DIASTOLIC BLOOD PRESSURE: 67 MMHG

## 2024-09-12 DIAGNOSIS — K21.9 GERD WITHOUT ESOPHAGITIS: ICD-10-CM

## 2024-09-12 DIAGNOSIS — L20.84 INTRINSIC ECZEMA: ICD-10-CM

## 2024-09-12 DIAGNOSIS — Z12.11 SCREENING FOR COLON CANCER: ICD-10-CM

## 2024-09-12 DIAGNOSIS — Z13.6 SCREENING FOR HEART DISEASE: ICD-10-CM

## 2024-09-12 DIAGNOSIS — Z00.00 ROUTINE GENERAL MEDICAL EXAMINATION AT A HEALTH CARE FACILITY: Primary | ICD-10-CM

## 2024-09-12 DIAGNOSIS — Z98.84 BARIATRIC SURGERY STATUS: ICD-10-CM

## 2024-09-12 DIAGNOSIS — Z12.5 SCREENING FOR PROSTATE CANCER: ICD-10-CM

## 2024-09-12 DIAGNOSIS — K21.00 GASTROESOPHAGEAL REFLUX DISEASE WITH ESOPHAGITIS WITHOUT HEMORRHAGE: ICD-10-CM

## 2024-09-12 DIAGNOSIS — R53.83 FATIGUE, UNSPECIFIED TYPE: ICD-10-CM

## 2024-09-12 DIAGNOSIS — K91.2 POSTSURGICAL MALABSORPTION: ICD-10-CM

## 2024-09-12 DIAGNOSIS — Z23 NEED FOR VACCINATION: ICD-10-CM

## 2024-09-12 LAB
% GRANULOCYTES: 73.4 % (ref 42.2–75.2)
ALBUMIN SERPL BCG-MCNC: 4.6 G/DL (ref 3.5–5.2)
ALP SERPL-CCNC: 75 U/L (ref 40–150)
ALT SERPL W P-5'-P-CCNC: 27 U/L (ref 0–70)
ANION GAP SERPL CALCULATED.3IONS-SCNC: 9 MMOL/L (ref 7–15)
AST SERPL W P-5'-P-CCNC: 13 U/L (ref 0–45)
BILIRUB SERPL-MCNC: 0.8 MG/DL
BUN SERPL-MCNC: 16 MG/DL (ref 8–23)
CALCIUM SERPL-MCNC: 9.5 MG/DL (ref 8.8–10.4)
CHLORIDE SERPL-SCNC: 101 MMOL/L (ref 98–107)
CHOLESTEROL: 164 MG/DL (ref 100–199)
CREAT SERPL-MCNC: 0.96 MG/DL (ref 0.67–1.17)
EGFRCR SERPLBLD CKD-EPI 2021: 88 ML/MIN/1.73M2
FASTING STATUS PATIENT QL REPORTED: YES
FASTING?: YES
GLUCOSE SERPL-MCNC: 89 MG/DL (ref 70–99)
HCO3 SERPL-SCNC: 29 MMOL/L (ref 22–29)
HCT VFR BLD AUTO: 45.3 % (ref 39–51)
HDL (RMG): 51 MG/DL (ref 40–?)
HEMOGLOBIN: 15.4 G/DL (ref 13.4–17.5)
LDL CALCULATED (RMG): 99 MG/DL (ref 0–130)
LYMPHOCYTES NFR BLD AUTO: 21.4 % (ref 20.5–51.1)
MCH RBC QN AUTO: 30 PG (ref 27–31)
MCHC RBC AUTO-ENTMCNC: 34 G/DL (ref 33–37)
MCV RBC AUTO: 88.1 FL (ref 80–100)
MONOCYTES NFR BLD AUTO: 5.2 % (ref 1.7–9.3)
PLATELET # BLD AUTO: 180 K/UL (ref 140–450)
POTASSIUM SERPL-SCNC: 4 MMOL/L (ref 3.4–5.3)
PROT SERPL-MCNC: 7.2 G/DL (ref 6.4–8.3)
PSA SERPL DL<=0.01 NG/ML-MCNC: 6.54 NG/ML (ref 0–4.5)
RBC # BLD AUTO: 5.14 X10/CMM (ref 4.2–5.9)
SODIUM SERPL-SCNC: 139 MMOL/L (ref 135–145)
TRIGLYCERIDES (RMG): 71 MG/DL (ref 0–149)
TSH SERPL DL<=0.005 MIU/L-ACNC: 1.8 UIU/ML (ref 0.3–4.2)
WBC # BLD AUTO: 4.8 X10/CMM (ref 3.8–11)

## 2024-09-12 PROCEDURE — 36415 COLL VENOUS BLD VENIPUNCTURE: CPT | Performed by: FAMILY MEDICINE

## 2024-09-12 PROCEDURE — 84443 ASSAY THYROID STIM HORMONE: CPT | Mod: 90 | Performed by: FAMILY MEDICINE

## 2024-09-12 PROCEDURE — 85025 COMPLETE CBC W/AUTO DIFF WBC: CPT | Performed by: FAMILY MEDICINE

## 2024-09-12 PROCEDURE — 3074F SYST BP LT 130 MM HG: CPT | Performed by: FAMILY MEDICINE

## 2024-09-12 PROCEDURE — 90480 ADMN SARSCOV2 VAC 1/ONLY CMP: CPT | Performed by: FAMILY MEDICINE

## 2024-09-12 PROCEDURE — 80061 LIPID PANEL: CPT | Mod: QW | Performed by: FAMILY MEDICINE

## 2024-09-12 PROCEDURE — 91320 SARSCV2 VAC 30MCG TRS-SUC IM: CPT | Performed by: FAMILY MEDICINE

## 2024-09-12 PROCEDURE — 90471 IMMUNIZATION ADMIN: CPT | Performed by: FAMILY MEDICINE

## 2024-09-12 PROCEDURE — 80053 COMPREHEN METABOLIC PANEL: CPT | Performed by: FAMILY MEDICINE

## 2024-09-12 PROCEDURE — 99396 PREV VISIT EST AGE 40-64: CPT | Performed by: FAMILY MEDICINE

## 2024-09-12 PROCEDURE — 90661 CCIIV3 VAC ABX FR 0.5 ML IM: CPT | Performed by: FAMILY MEDICINE

## 2024-09-12 PROCEDURE — G0103 PSA SCREENING: HCPCS | Mod: 90 | Performed by: FAMILY MEDICINE

## 2024-09-12 RX ORDER — TRIAMCINOLONE ACETONIDE 1 MG/G
OINTMENT TOPICAL 2 TIMES DAILY PRN
Qty: 60 G | Refills: 1 | Status: SHIPPED | OUTPATIENT
Start: 2024-09-12

## 2024-09-12 NOTE — PATIENT INSTRUCTIONS
Patient Education   Preventive Care Advice   This is general advice given by our system to help you stay healthy. However, your care team may have specific advice just for you. Please talk to your care team about your preventive care needs.  Nutrition  Eat 5 or more servings of fruits and vegetables each day.  Try wheat bread, brown rice and whole grain pasta (instead of white bread, rice, and pasta).  Get enough calcium and vitamin D. Check the label on foods and aim for 100% of the RDA (recommended daily allowance).  Lifestyle  Exercise at least 150 minutes each week  (30 minutes a day, 5 days a week).  Do muscle strengthening activities 2 days a week. These help control your weight and prevent disease.  No smoking.  Wear sunscreen to prevent skin cancer.  Have a dental exam and cleaning every 6 months.  Yearly exams  See your health care team every year to talk about:  Any changes in your health.  Any medicines your care team has prescribed.  Preventive care, family planning, and ways to prevent chronic diseases.  Shots (vaccines)   HPV shots (up to age 26), if you've never had them before.  Hepatitis B shots (up to age 59), if you've never had them before.  COVID-19 shot: Get this shot when it's due.  Flu shot: Get a flu shot every year.  Tetanus shot: Get a tetanus shot every 10 years.  Pneumococcal, hepatitis A, and RSV shots: Ask your care team if you need these based on your risk.  Shingles shot (for age 50 and up)  General health tests  Diabetes screening:  Starting at age 35, Get screened for diabetes at least every 3 years.  If you are younger than age 35, ask your care team if you should be screened for diabetes.  Cholesterol test: At age 39, start having a cholesterol test every 5 years, or more often if advised.  Bone density scan (DEXA): At age 50, ask your care team if you should have this scan for osteoporosis (brittle bones).  Hepatitis C: Get tested at least once in your life.  STIs (sexually  transmitted infections)  Before age 24: Ask your care team if you should be screened for STIs.  After age 24: Get screened for STIs if you're at risk. You are at risk for STIs (including HIV) if:  You are sexually active with more than one person.  You don't use condoms every time.  You or a partner was diagnosed with a sexually transmitted infection.  If you are at risk for HIV, ask about PrEP medicine to prevent HIV.  Get tested for HIV at least once in your life, whether you are at risk for HIV or not.  Cancer screening tests  Cervical cancer screening: If you have a cervix, begin getting regular cervical cancer screening tests starting at age 21.  Breast cancer scan (mammogram): If you've ever had breasts, begin having regular mammograms starting at age 40. This is a scan to check for breast cancer.  Colon cancer screening: It is important to start screening for colon cancer at age 45.  Have a colonoscopy test every 10 years (or more often if you're at risk) Or, ask your provider about stool tests like a FIT test every year or Cologuard test every 3 years.  To learn more about your testing options, visit:   .  For help making a decision, visit:   https://bit.ly/pd05211.  Prostate cancer screening test: If you have a prostate, ask your care team if a prostate cancer screening test (PSA) at age 55 is right for you.  Lung cancer screening: If you are a current or former smoker ages 50 to 80, ask your care team if ongoing lung cancer screenings are right for you.  For informational purposes only. Not to replace the advice of your health care provider. Copyright   2023 [employer]. All rights reserved. Clinically reviewed by the [facility] Transitions Program. [employer] 172040 - REV 01/24.      secondary to lethargy/unable to perform maximum assist (25% patients effort) maximum assist (25% patients effort) maximum assist (25% patients effort)

## 2024-09-24 DIAGNOSIS — E66.3 OVERWEIGHT WITH BODY MASS INDEX (BMI) OF 27 TO 27.9 IN ADULT: ICD-10-CM

## 2024-09-25 RX ORDER — SEMAGLUTIDE 2.4 MG/.75ML
INJECTION, SOLUTION SUBCUTANEOUS
Qty: 9 ML | Refills: 3 | OUTPATIENT
Start: 2024-09-25

## 2024-09-25 NOTE — TELEPHONE ENCOUNTER
Pt provided refill 7/18/24 for 3 month supply - too early.  Will refuse per clinic protocol.  Lili De Leon, MS, RD, RN

## 2024-09-26 ENCOUNTER — LAB (OUTPATIENT)
Dept: LAB | Facility: CLINIC | Age: 64
End: 2024-09-26
Payer: COMMERCIAL

## 2024-09-26 DIAGNOSIS — Z13.29 SCREENING FOR ENDOCRINE, NUTRITIONAL, METABOLIC AND IMMUNITY DISORDER: ICD-10-CM

## 2024-09-26 DIAGNOSIS — K91.2 POSTSURGICAL MALABSORPTION: ICD-10-CM

## 2024-09-26 DIAGNOSIS — Z13.21 SCREENING FOR ENDOCRINE, NUTRITIONAL, METABOLIC AND IMMUNITY DISORDER: ICD-10-CM

## 2024-09-26 DIAGNOSIS — Z13.228 SCREENING FOR ENDOCRINE, NUTRITIONAL, METABOLIC AND IMMUNITY DISORDER: ICD-10-CM

## 2024-09-26 DIAGNOSIS — Z13.0 SCREENING FOR ENDOCRINE, NUTRITIONAL, METABOLIC AND IMMUNITY DISORDER: ICD-10-CM

## 2024-09-26 DIAGNOSIS — Z98.84 BARIATRIC SURGERY STATUS: ICD-10-CM

## 2024-09-26 LAB
ALBUMIN SERPL BCG-MCNC: 4.3 G/DL (ref 3.5–5.2)
ALP SERPL-CCNC: 68 U/L (ref 40–150)
ALT SERPL W P-5'-P-CCNC: 26 U/L (ref 0–70)
ANION GAP SERPL CALCULATED.3IONS-SCNC: 9 MMOL/L (ref 7–15)
AST SERPL W P-5'-P-CCNC: 14 U/L (ref 0–45)
BILIRUB SERPL-MCNC: 0.7 MG/DL
BUN SERPL-MCNC: 18.8 MG/DL (ref 8–23)
CALCIUM SERPL-MCNC: 9.5 MG/DL (ref 8.8–10.4)
CHLORIDE SERPL-SCNC: 103 MMOL/L (ref 98–107)
CREAT SERPL-MCNC: 1.01 MG/DL (ref 0.67–1.17)
EGFRCR SERPLBLD CKD-EPI 2021: 83 ML/MIN/1.73M2
ERYTHROCYTE [DISTWIDTH] IN BLOOD BY AUTOMATED COUNT: 12.7 % (ref 10–15)
FERRITIN SERPL-MCNC: 85 NG/ML (ref 31–409)
GLUCOSE SERPL-MCNC: 93 MG/DL (ref 70–99)
HCO3 SERPL-SCNC: 27 MMOL/L (ref 22–29)
HCT VFR BLD AUTO: 45.4 % (ref 40–53)
HGB BLD-MCNC: 15.1 G/DL (ref 13.3–17.7)
IRON BINDING CAPACITY (ROCHE): 305 UG/DL (ref 240–430)
IRON SATN MFR SERPL: 36 % (ref 15–46)
IRON SERPL-MCNC: 110 UG/DL (ref 61–157)
MCH RBC QN AUTO: 29.9 PG (ref 26.5–33)
MCHC RBC AUTO-ENTMCNC: 33.3 G/DL (ref 31.5–36.5)
MCV RBC AUTO: 90 FL (ref 78–100)
PLATELET # BLD AUTO: 153 10E3/UL (ref 150–450)
POTASSIUM SERPL-SCNC: 4.5 MMOL/L (ref 3.4–5.3)
PROT SERPL-MCNC: 7 G/DL (ref 6.4–8.3)
PTH-INTACT SERPL-MCNC: 25 PG/ML (ref 15–65)
RBC # BLD AUTO: 5.05 10E6/UL (ref 4.4–5.9)
SODIUM SERPL-SCNC: 139 MMOL/L (ref 135–145)
VIT B12 SERPL-MCNC: 942 PG/ML (ref 232–1245)
VIT D+METAB SERPL-MCNC: 71 NG/ML (ref 20–50)
WBC # BLD AUTO: 4 10E3/UL (ref 4–11)

## 2024-09-26 PROCEDURE — 83540 ASSAY OF IRON: CPT

## 2024-09-26 PROCEDURE — 82607 VITAMIN B-12: CPT

## 2024-09-26 PROCEDURE — 83550 IRON BINDING TEST: CPT

## 2024-09-26 PROCEDURE — 80053 COMPREHEN METABOLIC PANEL: CPT

## 2024-09-26 PROCEDURE — 99000 SPECIMEN HANDLING OFFICE-LAB: CPT

## 2024-09-26 PROCEDURE — 82728 ASSAY OF FERRITIN: CPT

## 2024-09-26 PROCEDURE — 85027 COMPLETE CBC AUTOMATED: CPT

## 2024-09-26 PROCEDURE — 82306 VITAMIN D 25 HYDROXY: CPT

## 2024-09-26 PROCEDURE — 36415 COLL VENOUS BLD VENIPUNCTURE: CPT

## 2024-09-26 PROCEDURE — 83970 ASSAY OF PARATHORMONE: CPT

## 2024-09-26 PROCEDURE — 84590 ASSAY OF VITAMIN A: CPT | Mod: 90

## 2024-09-29 LAB
ANNOTATION COMMENT IMP: NORMAL
RETINYL PALMITATE SERPL-MCNC: <0.02 MG/L
VIT A SERPL-MCNC: 0.73 MG/L

## 2024-09-30 LAB — NONINV COLON CA DNA+OCC BLD SCRN STL QL: NEGATIVE

## 2024-11-05 NOTE — PROGRESS NOTES
"Ethan is a 63 year old who is being evaluated via a billable video visit.      The patient has been notified of following:     \"This video visit will be conducted via a call between you and your physician/provider. We have found that certain health care needs can be provided without the need for an in-person physical exam.  This service lets us provide the care you need with a video conversation.  If a prescription is necessary we can send it directly to your pharmacy.  If lab work is needed we can place an order for that and you can then stop by our lab to have the test done at a later time.    Video visits are billed at different rates depending on your insurance coverage.  Please reach out to your insurance provider with any questions.    If during the course of the call the physician/provider feels a video visit is not appropriate, you will not be charged for this service.\"    Patient has given verbal consent for Video visit? Yes    How would you like to obtain your AVS? MyChart    If the video visit is dropped, the invitation should be resent by: My ChaRT    Will anyone else be joining your video visit? No    I    Video-Visit Details    Type of service:  Video Visit    Originating Location (pt. Location): Home    Distant Location (provider location):   Off-Site - Provider Home Office    Platform used for Video Visit: Healint    Video Start Time: 9:02 AM    Video End Time: 9:23 AM            Return Bariatric Surgery Note    RE: Ethan Weiss  MR#: [id number]  : 1960      VISIT DATE: 2024    Dear Cuauhtemoc Arredondo,    I had the pleasure of seeing your patient, Ethan Weiss, in my post-bariatric surgery assessment clinic.    CHIEF COMPLAINT: Post-bariatric surgery follow-up. Overall doing well. Continues taking wegovy 2.4 mg. On . Tolerating very well.     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  Will be switching over to medicare come December " 1st.  Not sure about medicare coverage for wegovy.     Is very happy with weight loss.        HISTORY OF PRESENT ILLNESS:      11/6/2024    11:55 AM   Questions Regarding Prior Weight Loss Surgery Reviewed With Patient   I had the following weight loss procedure Renea-en-y Gastric Bypass   What year was your surgery? 2013   How has your weight changed since your last visit? I have lost weight   Do you currently have any of the following Heartburn, acid reflux, or GERD (acid reflux disease)?   Do you have any concerns today? no     Patient is taking the following bariatric postoperative vitamins:  1 Celebrate MVI - 18 mg iron and 75 mcg vitamin D  No Vitamin D due to MVI  1500 mg of Calcium daily in divided doses. Each tablet has 500 international unit(s) s  No B vitamins due to MVI    Weight History:      11/6/2024    11:55 AM   --   What is your highest lifetime weight? 235   What is your lowest weight since surgery? (In pounds) 199     Initial Weight (lbs): 318 lbs  Weight: 203 lb (92.1 kg) (Patient reported)  Last Visits Weight: 203 lb (92.1 kg)  Cumulative weight loss (lbs): 115  Weight Loss Percentage: 36.16%        11/6/2024    11:55 AM   Questions Regarding Co-Morbidities and Health Concerns Reviewed With Patient   Pre-diabetes Gone away   Diabetes II Gone away   High Blood Pressure Improved   High cholesterol Gone away   Heartburn/Reflux Stayed the same   Sleep apnea Never   PCOS Never   Back pain Never   Joint pain Never   Lower leg swelling Never           11/6/2024    11:55 AM   Eating Habits   How many meals do you eat per day? 3   Do you snack between meals? Yes   How much food are you eating at each meal? 1/2 cup to 1 cup   Are you able to separate your meals and liquids by at least 30 minutes? Sometimes   Are you able to avoid liquid calories? Yes     Drinks over 60 oz water daily         11/6/2024    11:55 AM   Exercise Questions Reviewed With Patient   How often do you exercise? 5 to 6 times per week    What is the duration of your exercise (in minutes)? 45 Minutes   What types of exercise do you do? walking    home gym    weightlifting   What keeps you from being more active? I should be more active but I just have not gotten around to it     Started doing HIIT 6 days weekly for 30-45 minutes for patients age group. Continues to walk regularly.     Social History:      11/6/2024    11:55 AM   --   Are you smoking? No   Are you drinking alcohol? No       Medications:  Current Outpatient Medications   Medication Sig Dispense Refill    calcium citrate-vitamin D (CITRACAL PETITES/VITAMIN D) 200-6.25 MG-MCG TABS per tablet Take 2 tablets by mouth 2 times daily      famotidine (PEPCID) 20 MG tablet Take 1 tablet (20 mg) by mouth 2 times daily. 180 tablet 0    Multiple Vitamins-Minerals (CELEBRATE MULTI-COMPLETE 18) CAPS Take 1 capsule by mouth daily      omeprazole (PRILOSEC) 20 MG DR capsule Take 1 capsule (20 mg) by mouth daily. 90 capsule 3    polyethylene glycol (MIRALAX) 17 GM/Dose powder FILL TO LINE (1 CAPFUL/17 GM) MIX AND DRINK DAILY 510 g 11    Semaglutide-Weight Management (WEGOVY) 2.4 MG/0.75ML pen Inject 2.4 mg subcutaneously once a week. 9 mL 0    triamcinolone (KENALOG) 0.1 % external ointment Apply topically 2 times daily as needed (eczema arms and legs). Ears, arms, legs 60 g 1     No current facility-administered medications for this visit.         11/6/2024    11:55 AM   --   Do you avoid NSAIDs such as (Ibuprofen, Aleve, Naproxen, Advil)? Yes     Very minimal amount of caffeine - green tea in the morning    ROS:  GI:       11/6/2024    11:55 AM   --   Vomiting No   Diarrhea No   Constipation No   Swallowing trouble No   Abdominal pain No   Heartburn No     Had more burping than acid issues but ENT did identify acid as contributing. Omeprazole helped. Has been on for years.     Skin:       11/6/2024    11:55 AM   BAR RBS ROS - SKIN   Rash in skin folds No     Psych:       11/6/2024    11:55 AM   --  "  Depression No   Anxiety No     :       11/6/2024    11:55 AM   BAR RBS ROS -    Stress urinary incontinence No       LABS/IMAGING/MEDICAL RECORDS REVIEW: [facility]      PHYSICAL EXAMINATION:  Ht 6' 1\" (1.854 m)   Wt 203 lb (92.1 kg)   BMI 26.78 kg/m     GENERAL: Alert and oriented x3. NAD  HEART: No murmurs, rubs or gallops, Regular rate and rhythm  LUNGS: Breathing unlabored. Lung sounds clear to auscultation bilaterally  ABDOMEN: No hernias, Incisions well healed, soft and nontender  EXTREMITIES: No LE edema bilaterally  SKIN: Warm and dry. No intertriginous irritation or rash      ASSESSMENT AND PLAN:      1. 11 years status laparoscopic gastric bypass    We reviewed the important post op bariatric recommendations:  eating 3 meals daily  eating protein first, getting >60gm protein daily  eating slowly, chewing food well  avoiding/limiting calorie containing beverages  avoiding fluids 30 minutes before, during, and after meals  limiting restaurant or cafeteria eating to twice a week or less  Pt reminded to avoid marginal ulcers he should avoid tobacco at all, alcohol in excess, caffeine, and NSAIDS (unless indicated for cardioprotection or othewise and opposed by a PPI).  Pt encouraged to establish and maintain a consistent physical activity routine, 6-8 hours of restorative sleep each night and optimal stress management.  Pt counseled on the importance of life long vitamin supplementation and life long follow up.     2. Morbid Obesity resolved current BMI: Body mass index is 26.78 kg/m .  Congratulated patient on weight loss!  Will continue with wegovy. Rx sent over today. Recommended possibly spacing out the dosage to every 10 days. Can follow up in the spring to discuss other WLM options since medicare does not cover wegovy.      3. Post surgical malabsorption:  Reviewed Labs previously ordered - will have patient change calcium supplement due to elevated vitamin D. Labs placed to recheck in 2-3 " months   Follow food plan per dietitian recommendations.   Continue taking recommended post-op vitamins.    4.  GERD without esophagitis - RX for famotidine sent over. If patient has continued burping or acid issues can return to omeprazole  5. Return to clinic in spring 2025 for a medication check      Sincerely,    Jann Arteaga PA-C    33 minutes spent on the date of the encounter doing chart review, review of test results, patient visit and documentation

## 2024-11-12 ENCOUNTER — MYC REFILL (OUTPATIENT)
Dept: SURGERY | Facility: CLINIC | Age: 64
End: 2024-11-12
Payer: COMMERCIAL

## 2024-11-12 DIAGNOSIS — K59.00 CONSTIPATION, UNSPECIFIED CONSTIPATION TYPE: ICD-10-CM

## 2024-11-13 VITALS — WEIGHT: 203 LBS | HEIGHT: 73 IN | BODY MASS INDEX: 26.9 KG/M2

## 2024-11-14 ENCOUNTER — VIRTUAL VISIT (OUTPATIENT)
Dept: SURGERY | Facility: CLINIC | Age: 64
End: 2024-11-14
Payer: COMMERCIAL

## 2024-11-14 DIAGNOSIS — K91.2 POSTSURGICAL MALABSORPTION: ICD-10-CM

## 2024-11-14 DIAGNOSIS — K21.9 GASTROESOPHAGEAL REFLUX DISEASE WITHOUT ESOPHAGITIS: ICD-10-CM

## 2024-11-14 DIAGNOSIS — E66.3 OVERWEIGHT WITH BODY MASS INDEX (BMI) OF 26 TO 26.9 IN ADULT: Primary | ICD-10-CM

## 2024-11-14 DIAGNOSIS — Z98.84 BARIATRIC SURGERY STATUS: ICD-10-CM

## 2024-11-14 PROCEDURE — 99214 OFFICE O/P EST MOD 30 MIN: CPT | Mod: 95 | Performed by: PHYSICIAN ASSISTANT

## 2024-11-14 RX ORDER — SEMAGLUTIDE 2.4 MG/.75ML
2.4 INJECTION, SOLUTION SUBCUTANEOUS WEEKLY
Qty: 9 ML | Refills: 0 | Status: SHIPPED | OUTPATIENT
Start: 2024-11-14

## 2024-11-14 RX ORDER — POLYETHYLENE GLYCOL 3350 17 G/17G
1 POWDER, FOR SOLUTION ORAL DAILY
Qty: 510 G | Refills: 11 | Status: SHIPPED | OUTPATIENT
Start: 2024-11-14

## 2024-11-14 RX ORDER — FAMOTIDINE 20 MG/1
20 TABLET, FILM COATED ORAL 2 TIMES DAILY
Qty: 180 TABLET | Refills: 0 | Status: SHIPPED | OUTPATIENT
Start: 2024-11-14

## 2024-11-14 NOTE — Clinical Note
Good morning. You are seeing this lara patient next week. Can you please address his elevated vitamin D with mostly changes to his calcium.  I told him you would have this discussion. Thanks

## 2024-11-14 NOTE — PATIENT INSTRUCTIONS
Nice to talk with you today.  Below is the plan discussed.-  . Jann Arteaga PA-C    Plan:  Labs ordered. Call [phone] to schedule.  Continue your bariatric vitamins   Continue wegovy      FOLLOW-UP:  Call [phone] to schedule next visit        Bariatric Post Op Guidelines  General:  Follow up annually lifelong.   Obesity is a chronic disease.  Weight gain can be expected. The goal of follow-up visits is to ensure adequate vitamin and protein absorption, evaluate food intake behavior, review exercise/activity level, and assist with weight regain.    To avoid marginal ulcers avoid all forms of tobacco, alcohol in excess, caffeine, and NSAIDS     Exercise is key for weight loss and weight maintenance. Aim for 30-60 minutes of physical activity most days.  Include cardiovascular and strength training.    Continue lifelong vitamins supplementation and annual lab follow up.  All patients should supplement with the following bariatric postoperative vitamins:  2 Complete multivitamins with minerals (at different times than calcium)  Vitamin D 5000 Int Units/125 mg daily   Calcium 600 mg twice daily or 500 mg three times daily   Vitamin B12: 500 mcg sl daily or 1000 mcg Inj monthly  B complex daily or Thiamine 100 mg weekly  1 Iron/Vit C. Daily for females who menstruate and/or as directed    Relay GI symptoms which can be a sign of complications. Inability to tolerate solid food (chicken, steak, fish) should by need to be evaluated.    There is a 10% increase of Alcohol Use Disorder in patients with bariatric surgery.   Most often occurring around 2 years post op.  Call if you feel alcohol is interfering in your daily life.  We can help.     Nutritional:  Eat 3 meals per day  (No snacks between meals.)  Do not skip meals.  This can cause overeating at the next meal and will prevent adequate protein and nutritional intake.    Aim for 60-80 grams of protein per day.  Always eat your protein first. This assists  with optimal nutrition and helps you stay full longer.    Eat your protein first, and then follow with fiber.    Add fiber by including fruits, vegetables, whole grains, and beans.     Portions should be 1 cup per meal.   Continue to use saucer/salad plates, infant/toddler silverware to keep portion sizes small and take small bites.  Make each meal last 20-30 minutes. Always stop eating when satisfied.    Aim for 64 oz. of calorie-free fluids daily.    Avoid drinking 30 min before, during, and 30 min after meal    Avoid high sugar and high fat foods to prevent high calorie intake.   Check nutrition labels for less than 10 grams of sugar and less than 10 grams of fat per serving.         Compound Semaglutide at [facility]  [facility] is now offering compounded semaglutide during the time of Wegovy national shortage/limited supply. Semaglutide is the generic name of Wegovy. [facility] is following the highest standards for sterility and compounding practices. Not all compounding practices are equal. Therefore, [facility] will not be prescribing compounded semaglutide outside of the [facility]. Compounding of semaglutide is legal for as long as Wegovy is on the FDA's national shortage list. When/if Wegovy is taken off the FDA's shortage list, compounded semaglutide will no longer be legal to manufacture. When this occurs, patients will have to turn to acquiring Wegovy via its available manufactured pen, look into alternative weight loss medication(s), or stop the medication. Compound semaglutide will be available as a pre-filled syringe. Due to high demand of compounded semaglutide, orders may take 1-2 weeks to obtain from time of prescribing. Each dose of the medication will require a separate prescription.     As with any weight loss medication(s), there is a risk of weight regain should you stop semaglutide. It is important to be aware  "of this risk should you stop compounded semaglutide with no plans to transition back to an alternative injectable option as the use of semaglutide is intended for long term weight management with the intention of remaining on this injectable long term.        Obtaining Medication and Storage:   The pharmacy must speak to the patient directly prior to shipping medication to walk through administration, shipping and cost. Vials of compounded semaglutide and unit syringes will be mailed from the compounding pharmacy to your home in a refrigerated box. The vial you will be given is a multi-use vial, meaning that you will use the same vial during the next 28 days for each of your injections. Use a new syringe for each injection. The syringe that will be used to draw up your dose is a \"unit\" syringe, meaning your dose will have an associated \"mg\" and \"units\". Please see your prescription and the below information to verify the dose you should be injecting in UNITS prior to doing your injection. Keep your medication stored in the refrigerator. The vials are to be discarded 28 days after opening (even if there is remaining medication in the vial).     Do not pre-fill syringes from the multi-use vial for future use. Sterility cannot be verified. You should only be drawing up the amount needed for the injection that day, then placing vial back into refrigerator for storage for next injection.     Common Side Effects:  Side effect profile is the same as Wegovy. Monitor for nausea, diarrhea, constipation, headache, indigestion, tiredness (fatigue), stomach upset or abdominal pain. Less commonly, semaglutide can cause low blood sugar (symptoms: shaky, dizzy, sweaty, agitation). Please reach out to the care team should you feel like this is occurring. It is important to ensure that you are eating consistent meals and not skipping meals. Ensure you are getting at least 64 oz water daily. If any side effects become unmanageable, " contact the care team immediately.    Dosing:  Each week you will have the opportunity/option to increase your dose. However, therapy is individualized for each patient. The below is a general guide should someone increase dosage of compound semaglutide each month.   Week 1-4: Inject 0.25 mg (meaning 5 units) subcutaneously once weekly  Week 5-8: If tolerating, increase to 0.5 mg (meaning 10 units) once weekly  Week 9-12: If tolerating, increase to 1 mg (meaning 20 units) once weekly  Week 13-15: If tolerating, increase to 1.5 mg (meaning 30 units) OR 1.7 mg (meaning 34 units) once weekly (dosing depending on what was prescribed by provider)  Week 16-19: If tolerating, increase to 2 mg (meaning 40 units) once weekly  Week 20 & on: If tolerating, increase to 2.4 mg (meaning 48 units) or 2.5 mg (meaning 50 units) once weekly (dosing depending on what was prescribed by provider)  *If you are having some nausea or other side effects to where you are hesitant to move up to the next dose, stay at the same dose you are on for an additional 4 weeks to see if side effect(s) improves/resolves. Make sure to take this time to hydrate and utilizing smaller more consistent meals, such as 4-6 small meals per day.  It may be advantageous to stay at the same dose if you are seeing good efficacy (both on and off the scale) and having minimal/manageable side effects. If you do not have additional refills on that dose's prescription, please reach out to the clinic.    Mg to Unit Conversion Chart for Reference:         Administration:  Follow the instructions to fill the syringe with medicine. Instructions can be accessed at [email or web address] or by scanning this QR code-    Follow the instructions to inject your medication. Instructions can be accessed at [email or web address]  or by scanning this QR code-      Syringe Disposal:   Place the used syringe in a sharps container. You can buy a sharps container at your local  pharmacy.   If you don't have a sharps container, you can use a plastic detergent container with a lid.   Visit Learning About Safe Needle Use and Disposal (adflyer.net) and safeneedledisposal.org for more information.     Cost:   $230 per month for doses 1 mg or less (one 1 mL vial), $370 per month for doses higher than 1 mg (two 1 mL vials)     [facility] Phone:  [phone]

## 2024-11-18 ENCOUNTER — VIRTUAL VISIT (OUTPATIENT)
Dept: SURGERY | Facility: CLINIC | Age: 64
End: 2024-11-18
Payer: COMMERCIAL

## 2024-11-18 VITALS — HEIGHT: 73 IN | BODY MASS INDEX: 26.9 KG/M2 | WEIGHT: 203 LBS

## 2024-11-18 DIAGNOSIS — E66.3 OVERWEIGHT WITH BODY MASS INDEX (BMI) OF 26 TO 26.9 IN ADULT: Primary | ICD-10-CM

## 2024-11-18 DIAGNOSIS — Z98.84 BARIATRIC SURGERY STATUS: ICD-10-CM

## 2024-11-18 DIAGNOSIS — Z86.39 HISTORY OF OBESITY: ICD-10-CM

## 2024-11-18 DIAGNOSIS — K91.2 POSTSURGICAL MALABSORPTION: ICD-10-CM

## 2024-11-18 PROCEDURE — 97803 MED NUTRITION INDIV SUBSEQ: CPT | Mod: 95

## 2024-11-18 NOTE — PATIENT INSTRUCTIONS
Leroy Long!      It was great chatting with you again today! Here's a summary of the goals we discussed:    1. Switch your Calcium to a product that does not contain Vitamin D  https://Pinshape/products/calcium-citrate (available on Amazon; would need 2 capsules, 3x/day)  https://[email or web address]/antacid-products/500mg/ (available at most retail and drug stores; would need 1 tablet, 3x/day)    2. Separate fluids from meals and snacks  - Avoid sipping/drinking during all meals and snacks, as well as a full 30 minutes after finishing    3. Include both protein and fiber at snacks  - See list below    4. Do a full protein drink each day  - This will bring you closer to a goal of 60-90g per day      Plan on following up in 12 months. This can be scheduled via our call center at . Reach out sooner with any questions or concerns. Have a great day!      Sammie Carpio RD, LD  Clinical Dietitian     Easy Foods (by group)      Meals: pick 1 item from 3-4 food groups  Snacks: pick 1 item from 1-2 food groups     Protein:  yogurt  cottage cheese  low-fat cheese sticks/string cheese  lean deli meat (ie: chicken, turkey or low-fat ham)  lean beef or turkey jerky  pre-cooked grilled chicken breast  hard boiled eggs  nuts/seeds (in moderation - up to 1 serving [1/4c] per day)  protein shakes/bars (ie:  Premier Protein )  low-fat milk   edamame  chickpeas  rotisserie chicken   frozen veggie/black bean burgers  canned tuna or chicken         Vegetables:  baby carrots  celery sticks  sugar snap peas  cherry tomatoes  sliced bell peppers  sliced cucumber  chopped broccoli or cauliflower  raw string beans     Fruit:  grapes  berries  apples  pears  bananas  peaches  nectarines  plums  kiwi  pineapple        Starches:  whole wheat kimi bread ( Schuyler s )  high-fiber tortillas ( [employer]by Carb Balance )  Bean-based tortilla chips ( Beanitos )  Whole wheat crackers ( Triscuits )  Whole wheat bread  Dried/roasted beans/lentils  ( B[employer]les [employer]ada Boom  dried Juan José beans - available online or in some stores)  roasted chickpeas  high-fiber cereal (Fiber One, Kashi, Shredded Wheat, Grape Nuts, etc)  air-popped popcorn (1-2 cups)

## 2024-11-18 NOTE — PROGRESS NOTES
"Ethan is a 63 year old who is being evaluated via a billable video visit.      The patient has been notified of following:     \"This video visit will be conducted via a call between you and your physician/provider. We have found that certain health care needs can be provided without the need for an in-person physical exam.  This service lets us provide the care you need with a video conversation.  If a prescription is necessary we can send it directly to your pharmacy.  If lab work is needed we can place an order for that and you can then stop by our lab to have the test done at a later time.    Video visits are billed at different rates depending on your insurance coverage.  Please reach out to your insurance provider with any questions.    If during the course of the call the physician/provider feels a video visit is not appropriate, you will not be charged for this service.\"    Patient has given verbal consent for Video visit? Yes    How would you like to obtain your AVS? MyChart    If the video visit is dropped, the invitation should be resent by: Text to cell phone: [phone]    Will anyone else be joining your video visit? No    I    Video-Visit Details    Type of service:  Video Visit    Originating Location (pt. Location): Home    Distant Location (provider location):   Off-Site - Provider Home Office    Platform used for Video Visit: [employer]    Video Start Time:  8:30am    Video End Time: 8:43am    NUTRITION POST OP APPOINTMENT  DATE OF VISIT: November 18, 2024    Ethan Weiss  1960  male  [id number]  63 year old     ASSESSMENT:    REASON FOR VISIT:  Ethan is a 63 year old year old male presents today for 11 year PO nutrition follow-up appointment. Patient is accompanied by self.    DIAGNOSIS:  Status post gastric bypass surgery.  Overweight BMI 25-29.9     ANTHROPOMETRICS:  Initial Weight: 318 lbs   Height: 6'1  Current Weight:  203 lbs 0 oz    BMI: Body mass index is 26.78 kg/m .    VITAMINS " "AND MINERALS:  1 Multivitamin with Minerals (AM - Celebrate ONE 18mg Iron)  400 mg Calcium With Vitamin D (lunch, dinner, bedtime)     *MVT sufficient to meet Vitamin D, Iron and Thiamin needs.     *Pt not taking any additional Vitamin B12; if labs stable okay to continue without    Elevated Vitamin D noted. Recommended switch to Calcium w/out Vitamin D. Recommended Solaray or TUMs.    NUTRITION HISTORY:  Breakfast: Muesli w/yogurt + fair life milk + 1/4 protein shake  Lunch: 1 slice of toast w/cheese   Supper: gnocci + italian sausage + spinach  \"meat + vegetable + starch\"  Snacks: [afternoon] Twizzlers, oat crackers  [evenings] handful of crackers  Fluids consumed: Water/enhanced water (66oz), matcha tea, milk  Consuming liquid calories: Yes  Protein intake: 50-60 grams/day  Tolerate regular texture food: Yes  Any foods not tolerated details: Yes  If any food not tolerated: hamburgers, steak   Portion size: 1 cup or more  Take 20-30 minutes to consume each meal: No   Eat protein foods first: Yes  Fluids and meals separate by at least 30 minutes: Usually  Chew foods thoroughly: Yes  Tolerating diet: Yes  Drinking high protein supplements: inconsistent  Consuming snacks per day: 2   Additional Information: Pt feeling well and tolerating diet. Appropriate questions about snacking. Reviewed rationale for  liquids from solids by 30 minutes.         PHYSICAL ACTIVITY:  HIIT (6x/week for 30 minutes)  walking dog (most days,  minutes)       DIAGNOSIS:  Previous Nutrition Diagnosis: Altered gastrointestinal function related to alteration in gastrointestinal structure as evidenced by history of gastric bypass surgery.- no change    Previous goals:  Separate fluids from meals/snacks by full 30 minutes - not met  Include protein at all meals and snacks - not met    Current Nutrition Diagnosis: Altered gastrointestinal function related to alteration in gastrointestinal structure as evidenced by history of " gastric bypass surgery.    INTERVENTION:   Nutrition Prescription: Eat 3 meals a day at regular intervals. Consume 60-90 grams of protein daily. Follow post-surgical vitamin and mineral protocol.  Assessed learning needs and learning preferences.    GOALS:  Switch to Calcium w/out Vitamin D  Include protein + fiber at snacks  Have full protein drink each day  Separate fluids from meals/snacks by full 30 minutes    Follow-Up:   Recommend annual follow up visits to assist with lifestyle changes or per insurance.  Implementation: Discussed progress toward previous goals; reinforced importance of following bariatric lifestyle changes.    NUTRITION MONITORING AND EVALUATION:  Anticipated compliance: fair-good  Verbalized fair-good understanding.    Follow up: Patient to follow up in 12 months.    TIME SPENT WITH PATIENT:  13 minutes        Sammie Carpio RD, LD  Clinical Dietitian

## 2025-01-14 ENCOUNTER — MYC REFILL (OUTPATIENT)
Dept: SURGERY | Facility: CLINIC | Age: 65
End: 2025-01-14
Payer: COMMERCIAL

## 2025-01-14 ENCOUNTER — MYC REFILL (OUTPATIENT)
Dept: FAMILY MEDICINE | Facility: CLINIC | Age: 65
End: 2025-01-14

## 2025-01-14 DIAGNOSIS — K21.9 GASTROESOPHAGEAL REFLUX DISEASE WITHOUT ESOPHAGITIS: ICD-10-CM

## 2025-01-14 DIAGNOSIS — K59.00 CONSTIPATION, UNSPECIFIED CONSTIPATION TYPE: ICD-10-CM

## 2025-01-14 DIAGNOSIS — Z76.0 ENCOUNTER FOR MEDICATION REFILL: Primary | ICD-10-CM

## 2025-01-14 DIAGNOSIS — Z98.84 BARIATRIC SURGERY STATUS: ICD-10-CM

## 2025-01-14 DIAGNOSIS — L20.84 INTRINSIC ECZEMA: ICD-10-CM

## 2025-01-14 RX ORDER — POLYETHYLENE GLYCOL 3350 17 G/17G
1 POWDER, FOR SOLUTION ORAL DAILY
Qty: 510 G | Refills: 11 | Status: SHIPPED | OUTPATIENT
Start: 2025-01-14

## 2025-01-14 RX ORDER — FAMOTIDINE 20 MG/1
20 TABLET, FILM COATED ORAL 2 TIMES DAILY
Qty: 180 TABLET | Refills: 2 | Status: SHIPPED | OUTPATIENT
Start: 2025-01-14

## 2025-01-14 NOTE — TELEPHONE ENCOUNTER
Pt last seen 11/14/24 for annual.    Asked to RTC in 1 year.    Is requesting supportive meds famotidine and miralax that were originally sent to [facility].    Requesting transfer to [facility] instead.    Will approve this transfer.    Claudia GUERRERO RN, BSN

## 2025-01-16 RX ORDER — TRIAMCINOLONE ACETONIDE 1 MG/G
OINTMENT TOPICAL 2 TIMES DAILY PRN
Qty: 60 G | Refills: 1 | Status: SHIPPED | OUTPATIENT
Start: 2025-01-16

## 2025-01-16 RX ORDER — CALCIUM CITRATE/VITAMIN D3 200MG-6.25
2 TABLET ORAL 2 TIMES DAILY
Qty: 180 TABLET | Refills: 3 | Status: SHIPPED | OUTPATIENT
Start: 2025-01-16

## 2025-01-16 NOTE — CONFIDENTIAL NOTE
Med: CALCIUM, MULTIVITAMIN, TRIAMCINOLONE OINTMENT    LOV (related): 9/12/24 - CPX      Due for F/U around: 9/2025 FOR CPX    Next Appt: NONE

## 2025-04-17 ENCOUNTER — LAB (OUTPATIENT)
Dept: LAB | Facility: CLINIC | Age: 65
End: 2025-04-17
Payer: COMMERCIAL

## 2025-04-17 DIAGNOSIS — K91.2 POSTSURGICAL MALABSORPTION: ICD-10-CM

## 2025-04-17 DIAGNOSIS — Z98.84 BARIATRIC SURGERY STATUS: ICD-10-CM

## 2025-04-17 LAB
PTH-INTACT SERPL-MCNC: 37 PG/ML (ref 15–65)
VIT D+METAB SERPL-MCNC: 58 NG/ML (ref 20–50)

## 2025-05-27 ENCOUNTER — VIRTUAL VISIT (OUTPATIENT)
Dept: PHARMACY | Facility: CLINIC | Age: 65
End: 2025-05-27
Attending: PHYSICIAN ASSISTANT
Payer: COMMERCIAL

## 2025-05-27 VITALS — WEIGHT: 207 LBS | BODY MASS INDEX: 26.56 KG/M2 | HEIGHT: 74 IN

## 2025-05-27 DIAGNOSIS — Z98.84 HISTORY OF ROUX-EN-Y GASTRIC BYPASS: Primary | ICD-10-CM

## 2025-05-27 DIAGNOSIS — K21.9 GASTROESOPHAGEAL REFLUX DISEASE WITHOUT ESOPHAGITIS: ICD-10-CM

## 2025-05-27 DIAGNOSIS — L30.8 OTHER ECZEMA: ICD-10-CM

## 2025-05-27 DIAGNOSIS — Z86.39 HX OF OBESITY: ICD-10-CM

## 2025-05-27 RX ORDER — FAMOTIDINE 20 MG/1
20 TABLET, FILM COATED ORAL DAILY
Qty: 90 TABLET | Refills: 3 | Status: SHIPPED | OUTPATIENT
Start: 2025-05-27

## 2025-05-27 ASSESSMENT — PAIN SCALES - GENERAL: PAINLEVEL_OUTOF10: NO PAIN (0)

## 2025-05-27 NOTE — NURSING NOTE
Current patient location: [address]   [place] [address]    Is the patient currently in the state of [place]? YES    Visit mode: VIDEO    If the visit is dropped, the patient can be reconnected by:VIDEO VISIT: Text to cell phone:   Telephone Information:   Mobile [phone]   Mobile [phone]       Will anyone else be joining the visit? NO  (If patient encounters technical issues they should call [phone]    Are changes needed to the allergy or medication list? No    Are refills needed on medications prescribed by this physician? Discuss with provider    Rooming Documentation:  Questionnaire(s) not pre-assigned    Reason for visit: Medication Therapy Management    Adelfo LAWSON

## 2025-05-27 NOTE — PROGRESS NOTES
Medication Therapy Management (MTM) Encounter    ASSESSMENT:                            Medication Adherence/Access: No issues identified.    GERD    Stable with famotidine once daily, to continue.     Weight Management   Weight loss maintained. To continue Wegovy at this time. Once current supply runs out discussed next steps - either transition to getting Wegovy through [facility] for the $499/box price or Zepbound 10 mg vial $499/4 vials and continuing to extend dosing every 10 days for either option. He will think about which he prefers and contact team with 2 Wegovy pens left for next steps. Due to medicare cannot using savings card even if paying out of pocket for himself so cheapest option is to go through [facility].     History Renea-en-Y Gastric Bypass:   Stable. PTH within normal limits. Vitamin D between 30-60. To continue regimen as is without changes. Will send alternative option for calcium citrate without vitamin D per patient request just with cost and if wants to decrease from three times daily to twice daily but still get in similar calcium requirements.    Eczema:   Stable.     PLAN:                            Continue current regimen. Sent in updated famotidine RX to once daily.  When 2 Wegovy pens left, let Jann Arteaga or Lauren Bloch, MTM pharmacist know which of the following next steps you prefer to continue GLP1 for Weight Management:   Wegovy through [facility] for $499 per box injecting every 10 days  Zepbound 10 mg vial through [facility] for $499/4 single use vials injecting every 10 days  Schedule follow up with Jann Arteaga PA-C for November - [phone] to schedule   Alternative calcium option sent via [employer].     Follow-up: Return in about 1 year (around 5/27/2026) for Medication Therapy Management Pharmacist Visit.    SUBJECTIVE/OBJECTIVE:                          Ethan Weiss is a 64 year old male seen for a follow-up  "visit.  Last seen 3/4/2024.     Reason for visit: comprehensive review of medications, Wegovy check in.    Allergies/ADRs: Reviewed in chart  Past Medical History: Reviewed in chart  Tobacco: He reports that he has never smoked. He has never been exposed to tobacco smoke. He has never used smokeless tobacco.  Alcohol: not currently using  Caffeine: none.     Medication Adherence/Access: no issues reported.    GERD    Omeprazole 20 mg once daily  Famotidine 20 mg twice daily - using once daily     Patient reports no current symptoms. If he doesn't take he does have greater heartburn and belching.   Patient feels that current regimen is not effective. Acid reflux was giving nodules on vocal cords. Does feel the famotidine once daily is helpful.        Weight Management   Wegovy 2.4 mg every 10 days     Follows with Jann Arteaga PA-C at [facility], last seen 11/14/2025. Miralax daily to assist with previous constipation. Has been spacing Wegovy every 10 days. As down to 199 when was taking every 7 days but now at this new plateau with new 10 day spacing.   He continues to tolerate Wegovy well. Retired at end of November, so insurance switched and now Medicare and no longer covers.  He doesn't feel he could do his own injection with vial syringe but wife could do it. Has 7 Wegovy pens left.   Nutrition/Eating Habits: he is getting in 3 meals per day + snacks. Describes portions sizes are unchanged, smaller. He is satisfied earlier. Breakfast: protein shake.  Breakfast: protein shake; Lunch: small - toast + meat + cheese. Dinner: standard meal. Dinner: \"50% protein\". Gets 1 meal delivered 2-3 times per week from [facility]. Snacking between meals, small. Fruit and vegetables are limited.   Exercise/Activity: shelter and has access to activity. Tuesday and Thursdays strenuous yoga 60 minutes. Monday Wednesday Friday 45 minutes Kris Chi and 20 minutes strength training " "through [facility]. Saturdays rowing machine 20-30 minutes. [place] 30[place]45 minutes.     Initial Weight (lbs): 318 lbs      Current weight today: 207 lbs 0 oz  Cumulative Weight Loss: -111 lb, -34.9% from baseline    Wt Readings from Last 4 Encounters:   05/27/25 207 lb (93.9 kg)   11/18/24 203 lb (92.1 kg)   11/13/24 203 lb (92.1 kg)   09/12/24 206 lb (93.4 kg)     Estimated body mass index is 26.58 kg/m  as calculated from the following:    Height as of this encounter: 6' 2\" (1.88 m).    Weight as of this encounter: 207 lb (93.9 kg).      History Renea-en-Y Gastric Bypass:   CelebrateOne 18 mg iron: 1 capsule once daily   Calcium Citrate 1000 mg per 4 capsules: 2 capsules three times daily      Patient had gastric bypass completed in 2013.  He does not have complaints of acid reflux. He does not have issues of swallowing food/pills. History of reactive hypoglycemia since surgery? No. Now transitioned still to CelebrateOne and using calcium supplement without vitamin D. Looking for alternative options from cost perspective with Calcium citrate and taking three times daily and wish he could take less often.     Hemoglobin   Date Value Ref Range Status   09/26/2024 15.1 13.3 - 17.7 g/dL Final   09/12/2024 15.4 13.4 - 17.5 g/dl Final     Ferritin   Date Value Ref Range Status   09/26/2024 85 31 - 409 ng/mL Final   11/18/2020 46 26 - 388 ng/mL Final     Lab Results   Component Value Date    VITDT 58 (H) 04/17/2025      Lab Results   Component Value Date    PTHI 37 04/17/2025      Lab Results   Component Value Date    B12 942 09/26/2024      Lab Results   Component Value Date    MATEUSZ 0.73 09/26/2024     Eczema:   Triamcinolone 0.1%     Uses as needed for eczema sites and works well. No concerns.     Today's Vitals: Ht 6' 2\" (1.88 m)   Wt 207 lb (93.9 kg)   BMI 26.58 kg/m    ----------------    I spent 30 minutes with this patient today. All changes were made via collaborative practice agreement with " Jann Arteaga.     A summary of these recommendations was sent via [employer].    Lauren Bloch, PharmD, BCACP   Medication Therapy Management Pharmacist   [facility]    Telemedicine Visit Details  The patient's medications can be safely assessed via a telemedicine encounter.  Type of service:  Telephone visit  Originating Location (pt. Location): Home    Distant Location (provider location):  Off-site  Start Time: 9:00 AM  End Time: 9:30 AM     Medication Therapy Recommendations  Gastroesophageal reflux disease without esophagitis   1 Current Medication: famotidine (PEPCID) 20 MG tablet   Current Medication Sig: Take 1 tablet (20 mg) by mouth daily.   Rationale: Frequency inappropriate   Recommendation: Decrease Frequency - famotidine 20 MG tablet   Status: Accepted per CPA   Identified Date: 5/27/2025 Completed Date: 5/27/2025

## 2025-05-27 NOTE — Clinical Note
Continues Wegovy every 10 days and doing well. Discussed next step once current supply runs out since now Medicare has llimited but potential options so will let us know if he prefers Wegovy through [facility] or Zepbound 10 mg vial through "[facility]. His Vitamin looked good, < 60 and PTH within normal limits so no changes today. He will follow up with you in November. Famotidine once daily working well to managed GERD symptoms so did update RX to that. Thanks! Tosha KRISHNAN

## 2025-05-27 NOTE — PATIENT INSTRUCTIONS
"Recommendations from today's MTM visit:                                                       Continue current regimen for now.   Sent in updated famotidine RX to once daily.  When 2 Wegovy pens left, let Jann Arteaga or Lauren Bloch, [facility] pharmacist know which of the following next steps you prefer to continue GLP1 for Weight Management:   Wegovy through [facility] for $499 per box injecting every 10 days  Zepbound 10 mg vial through [facility] for $499/4 single use vials injecting every 10 days  Schedule follow up with Jann Arteaga PA-C for November - [phone] to schedule   Alternative Calcium citrate option:   Donovan Calcium Citrate 600 mg per 2 tablets: 2 tablets twice daily - $24.99 per 300 tablets - this may be a cheap option and then can take calcium less often, only 2 time per day and still satisfy the requirements of your gastric bypass. This bottle would last 75 days. This is on [employer], green bottle with yellow top.    Follow-up: Return in about 1 year (around 5/27/2026) for Medication Therapy Management Pharmacist Visit.    It was great speaking with you today.  I value your experience and would be very thankful for your time in providing feedback in our clinic survey. In the next few days, you may receive an email or text message from [facility] with a link to a survey related to your  clinical pharmacist.\"     To schedule another MTM appointment, please call the clinic directly or you may call the MTM scheduling line at [phone] or toll-free at [phone].     My Clinical Pharmacist's contact information:                                                      Please feel free to contact me with any questions or concerns you have.      Lauren Bloch, PharmD  Medication Therapy Management Pharmacist   [facility] Outlook             "

## 2025-06-11 DIAGNOSIS — L20.84 INTRINSIC ECZEMA: ICD-10-CM

## 2025-06-11 RX ORDER — TRIAMCINOLONE ACETONIDE 1 MG/G
OINTMENT TOPICAL
Qty: 60 G | Refills: 3 | Status: SHIPPED | OUTPATIENT
Start: 2025-06-11

## 2025-06-11 NOTE — CONFIDENTIAL NOTE
Med: TRIAMCINOLONE OINTMENT    LOV (related): 9/12/24 - CPX      Due for F/U around: 9/2025 FOR CPX    Next Appt: NONE

## 2025-07-06 ENCOUNTER — MYC MEDICAL ADVICE (OUTPATIENT)
Dept: PHARMACY | Facility: CLINIC | Age: 65
End: 2025-07-06
Payer: COMMERCIAL

## 2025-07-06 DIAGNOSIS — Z98.84 HISTORY OF ROUX-EN-Y GASTRIC BYPASS: ICD-10-CM

## 2025-07-06 DIAGNOSIS — Z86.39 HX OF OBESITY: Primary | ICD-10-CM

## 2025-07-10 RX ORDER — SEMAGLUTIDE 2.4 MG/.75ML
2.4 INJECTION, SOLUTION SUBCUTANEOUS WEEKLY
Qty: 3 ML | Refills: 2 | Status: SHIPPED | OUTPATIENT
Start: 2025-07-10